# Patient Record
Sex: MALE | Race: WHITE
[De-identification: names, ages, dates, MRNs, and addresses within clinical notes are randomized per-mention and may not be internally consistent; named-entity substitution may affect disease eponyms.]

---

## 2018-02-12 ENCOUNTER — HOSPITAL ENCOUNTER (INPATIENT)
Dept: HOSPITAL 74 - JER | Age: 83
Discharge: LEFT BEFORE DISCHARGE | DRG: 190 | End: 2018-02-12
Attending: SPECIALIST | Admitting: SPECIALIST
Payer: COMMERCIAL

## 2018-02-12 VITALS — BODY MASS INDEX: 21.9 KG/M2

## 2018-02-12 VITALS — TEMPERATURE: 97.9 F | SYSTOLIC BLOOD PRESSURE: 115 MMHG | DIASTOLIC BLOOD PRESSURE: 59 MMHG | HEART RATE: 104 BPM

## 2018-02-12 DIAGNOSIS — J18.8: ICD-10-CM

## 2018-02-12 DIAGNOSIS — F17.200: ICD-10-CM

## 2018-02-12 DIAGNOSIS — J44.0: Primary | ICD-10-CM

## 2018-02-12 DIAGNOSIS — E11.9: ICD-10-CM

## 2018-02-12 DIAGNOSIS — J44.1: ICD-10-CM

## 2018-02-12 LAB
ALBUMIN SERPL-MCNC: 3.7 G/DL (ref 3.4–5)
ALP SERPL-CCNC: 124 U/L (ref 45–117)
ALT SERPL-CCNC: 22 U/L (ref 12–78)
ANION GAP SERPL CALC-SCNC: 5 MMOL/L (ref 8–16)
AST SERPL-CCNC: 16 U/L (ref 15–37)
BASOPHILS # BLD: 0.5 % (ref 0–2)
BILIRUB SERPL-MCNC: 0.4 MG/DL (ref 0.2–1)
BUN SERPL-MCNC: 15 MG/DL (ref 7–18)
CALCIUM SERPL-MCNC: 8.2 MG/DL (ref 8.5–10.1)
CHLORIDE SERPL-SCNC: 100 MMOL/L (ref 98–107)
CO2 SERPL-SCNC: 32 MMOL/L (ref 21–32)
CREAT SERPL-MCNC: 0.6 MG/DL (ref 0.7–1.3)
DEPRECATED RDW RBC AUTO: 18.5 % (ref 11.9–15.9)
EOSINOPHIL # BLD: 2.7 % (ref 0–4.5)
GLUCOSE SERPL-MCNC: 126 MG/DL (ref 74–106)
HCT VFR BLD CALC: 31.8 % (ref 35.4–49)
HGB BLD-MCNC: 9.6 GM/DL (ref 11.7–16.9)
LYMPHOCYTES # BLD: 18.4 % (ref 8–40)
MCH RBC QN AUTO: 20.8 PG (ref 25.7–33.7)
MCHC RBC AUTO-ENTMCNC: 30.2 G/DL (ref 32–35.9)
MCV RBC: 69 FL (ref 80–96)
MONOCYTES # BLD AUTO: 8.8 % (ref 3.8–10.2)
NEUTROPHILS # BLD: 69.6 % (ref 42.8–82.8)
PLATELET # BLD AUTO: 297 K/MM3 (ref 134–434)
PMV BLD: 7.1 FL (ref 7.5–11.1)
POTASSIUM SERPLBLD-SCNC: 4.2 MMOL/L (ref 3.5–5.1)
PROT SERPL-MCNC: 7.4 G/DL (ref 6.4–8.2)
RBC # BLD AUTO: 4.62 M/MM3 (ref 4–5.6)
SODIUM SERPL-SCNC: 137 MMOL/L (ref 136–145)
WBC # BLD AUTO: 9.3 K/MM3 (ref 4–10)

## 2018-02-12 RX ADMIN — IPRATROPIUM BROMIDE AND ALBUTEROL SULFATE SCH AMP: .5; 3 SOLUTION RESPIRATORY (INHALATION) at 15:01

## 2018-02-12 RX ADMIN — IPRATROPIUM BROMIDE AND ALBUTEROL SULFATE SCH AMP: .5; 3 SOLUTION RESPIRATORY (INHALATION) at 15:19

## 2018-02-12 RX ADMIN — IPRATROPIUM BROMIDE AND ALBUTEROL SULFATE SCH AMP: .5; 3 SOLUTION RESPIRATORY (INHALATION) at 14:59

## 2018-02-12 NOTE — PDOC
History of Present Illness





- General


History Source: Patient





- History of Present Illness


Associated Symptoms: reports: cough, shortness of breath.  denies: chest pain/

soreness





<Shea DuboseAbnerMercy - Last Filed: 02/12/18 16:15>





<Vanita Leblanc - Last Filed: 02/12/18 19:15>





- General


Chief Complaint: Shortness of Breath


Stated Complaint: SOB


Time Seen by Provider: 02/12/18 14:14





Past History





- Past Medical History


Asthma: Yes


Cancer: Yes


COPD: Yes


Diabetes: Yes





- Suicide/Smoking/Psychosocial Hx


Smoking History: Current every day smoker


Have you smoked in the past 12 months: Yes


Number of Cigarettes Smoked Daily: 30


Information on smoking cessation initiated: No


Hx Alcohol Use: No


Drug/Substance Use Hx: No


Substance Use Type: None


Hx Substance Use Treatment: No





<Shea [facility]ose[facility] - Last Filed: 02/12/18 16:15>





<Vanita Leblanc - Last Filed: 02/12/18 19:15>





- Past Medical History


Allergies/Adverse Reactions: 


 Allergies











Allergy/AdvReac Type Severity Reaction Status Date / Time


 


No Known Allergies Allergy   Verified 02/12/18 13:55











Home Medications: 


Ambulatory Orders





Aspirin [Aspirin EC] 81 mg PO DAILY 02/12/18 


Diltiazem HCl [Cartia Xt] 120 mg PO DAILY 02/12/18 


Montelukast Sodium [Singulair] 10 mg PO DAILY 02/12/18 


Tamsulosin HCl 0.4 mg PO DAILY 02/12/18 











**Review of Systems





- Review of Systems


Constitutional: No: Chills, Fever


Respiratory: Yes: Cough, Shortness of Breath


Cardiac (ROS): No: Chest Pain, Lightheadedness, Palpitations, Syncope





<Shea Dubose[facility] - Last Filed: 02/12/18 16:15>





*Physical Exam





- Vital Signs


 Last Vital Signs











Temp Pulse Resp BP Pulse Ox


 


 97.9 F   104 H  24   115/59   97 


 


 02/12/18 13:48  02/12/18 13:48  02/12/18 13:48  02/12/18 13:48  02/12/18 13:48














- Physical Exam


General Appearance: Yes: Appropriately Dressed.  No: Apparent Distress


HEENT: positive: Normal Voice


Neck: positive: Supple.  negative: Lymphadenopathy (R), Lymphadenopathy (L)


Respiratory/Chest: positive: Wheezing (diffusely)


Cardiovascular: positive: S1, S2, Tachycardia


Gastrointestinal/Abdominal: positive: Soft.  negative: Tender


Extremity: negative: Pedal Edema


Integumentary: positive: Dry, Warm


Neurologic: positive: Fully Oriented, Alert, Normal Mood/Affect





<Edin Dubose - Last Filed: 02/12/18 16:15>





- Vital Signs


 Last Vital Signs











Temp Pulse Resp BP Pulse Ox


 


 97.9 F   104 H  24   115/59   97 


 


 02/12/18 13:48  02/12/18 13:48  02/12/18 13:48  02/12/18 13:48  02/12/18 13:48














<Vanita Leblanc - Last Filed: 02/12/18 19:15>





ED Treatment Course





- LABORATORY


CBC & Chemistry Diagram: 


 02/12/18 14:40





 02/12/18 14:40





- RADIOLOGY


Radiology Studies Ordered: 














 Category Date Time Status


 


 CHEST PA & LAT [RAD] Stat Radiology  02/12/18 14:21 Ordered














<Shea [facility]ose[facility] - Last Filed: 02/12/18 16:15>





- LABORATORY


CBC & Chemistry Diagram: 


 02/12/18 14:40





 02/12/18 14:40





- ADDITIONAL ORDERS


Additional order review: 


 Laboratory  Results











  02/12/18 02/12/18 02/12/18





  14:40 14:40 14:40


 


Sodium  137  


 


Potassium  4.2  


 


Chloride  100  


 


Carbon Dioxide  32  


 


Anion Gap  5 L  


 


BUN  15  


 


Creatinine  0.6 L  


 


Creat Clearance w eGFR  > 60  


 


Random Glucose  126 H  


 


Calcium  8.2 L  


 


Total Bilirubin  0.4  


 


AST  16  D  


 


ALT  22  


 


Alkaline Phosphatase  124 H  


 


Creatine Kinase    150


 


Creatine Kinase Index    2.8


 


CK-MB (CK-2)    4.297 H


 


Troponin I    < 0.02


 


B-Natriuretic Peptide   132.01 


 


Total Protein  7.4  


 


Albumin  3.7  








 











  02/12/18





  14:40


 


RBC  4.62


 


MCV  69.0 L


 


MCHC  30.2 L


 


RDW  18.5 H D


 


MPV  7.1 L


 


Neutrophils %  69.6


 


Lymphocytes %  18.4  D


 


Monocytes %  8.8


 


Eosinophils %  2.7  D


 


Basophils %  0.5  D














- Medications


Given in the ED: 


ED Medications














Discontinued Medications














Generic Name Dose Route Start Last Admin





  Trade Name Freq  PRN Reason Stop Dose Admin


 


Albuterol/Ipratropium  1 amp  02/12/18 14:30  02/12/18 15:19





  Duoneb -  NEB  02/12/18 15:16  1 amp





  Q15M SAM   Administration


 


Azithromycin  500 mg  02/12/18 15:42  02/12/18 15:50





  Zithromax -  PO  02/12/18 15:43  500 mg





  ONCE ONE   Administration


 


Ceftriaxone Sodium  1 mg  02/12/18 16:13  02/12/18 16:30





  Rocephin -  IVPUSH  02/12/18 16:14  Not Given





  ONCE ONE   


 


Ceftriaxone Sodium 1 gm/  100 mls @ 200 mls/hr  02/12/18 16:18  02/12/18 16:30





  Dextrose  IVPB  02/12/18 16:47  200 mls/hr





  ONCE ONE   Administration


 


Methylprednisolone Sodium Succinate  125 mg  02/12/18 14:21  02/12/18 14:59





  Solu-Medrol -  IVPUSH  02/12/18 14:22  125 mg





  ONCE ONE   Administration














<Vanita Leblanc - Last Filed: 02/12/18 19:15>





Medical Decision Making





- Medical Decision Making





02/12/18 14:22


85-year-old male, current smoker, bladder cancer, COPD, not on oxygen, known 

stable lung nodules, hypertension and diabetes, brought in by wife for 

worsening shortness of breath.  As per wife, patient has had difficulty 

breathing for 3 months with possible fatigue.  Was seen by his primary doctor >

2 months ago  who wanted pt admitted but pt refused at that time.  Presents 

today because shortness of breath worsened 3 days ago and not improving with 

several pumps at home.  Patient complains of cough which is appears chronic and 

mostly dry, no fever, chills or CP.  Possible recent unexplained weight loss 

per wife.  Patient denies any chest pain





See exam








COPD flare, less likely ACS, PE or dissection


-nebs


-pred


-cxr


-ekg


-labs


-anticipate admission


02/12/18 14:28





02/12/18 14:29





02/12/18 16:15


Chest x-ray read as left basilar consolidation.  Case discussed with Dr. Watkins

, patient's PMD, who is requesting that Dr. Cade of pulmonary be consulted.

  Blood cultures and antibiotics in progress.  Patient admitted











<Edin Dubose - Last Filed: 02/12/18 16:15>





*DC/Admit/Observation/Transfer





- Discharge Dispostion


Admit: Yes





<Edin Dubose - Last Filed: 02/12/18 16:15>





- Attestations


Physician Attestion: 





I reviewed the case with the mid-level practitioner and agree with the mid-

level practitioner's assessment, diagnosis and disposition.








<Vanita Leblanc - Last Filed: 02/12/18 19:15>


Diagnosis at time of Disposition: 


 COPD exacerbation





Pneumonia


Qualifiers:


 Pneumonia type: due to unspecified organism Laterality: left Lung location: 

unspecified part of lung Qualified Code(s): J18.9 - Pneumonia, unspecified 

organism








- Discharge Dispostion


Condition at time of disposition: Fair

## 2018-02-12 NOTE — EKG
Test Reason : 

Blood Pressure : ***/*** mmHG

Vent. Rate : 098 BPM     Atrial Rate : 098 BPM

   P-R Int : 132 ms          QRS Dur : 126 ms

    QT Int : 376 ms       P-R-T Axes : 065 080 046 degrees

   QTc Int : 480 ms

 

NORMAL SINUS RHYTHM

RIGHT BUNDLE BRANCH BLOCK

CANNOT RULE OUT INFERIOR INFARCT (CITED ON OR BEFORE 02-APR-2015)

ABNORMAL ECG

WHEN COMPARED WITH ECG OF 05-APR-2015 09:13,

NO SIGNIFICANT CHANGE WAS FOUND

Confirmed by DOMENICO ZUÑIGA MD ([id number]) on 2/12/2018 11:05:50 PM

 

Referred By:             Confirmed By:DOMENICO ZUÑIGA MD

## 2018-02-13 ENCOUNTER — HOSPITAL ENCOUNTER (INPATIENT)
Dept: HOSPITAL 74 - JER | Age: 83
LOS: 5 days | Discharge: HOME | DRG: 190 | End: 2018-02-18
Attending: SPECIALIST | Admitting: SPECIALIST
Payer: COMMERCIAL

## 2018-02-13 VITALS — BODY MASS INDEX: 20.3 KG/M2

## 2018-02-13 DIAGNOSIS — F17.210: ICD-10-CM

## 2018-02-13 DIAGNOSIS — J98.11: ICD-10-CM

## 2018-02-13 DIAGNOSIS — J18.9: ICD-10-CM

## 2018-02-13 DIAGNOSIS — D50.9: ICD-10-CM

## 2018-02-13 DIAGNOSIS — J44.1: Primary | ICD-10-CM

## 2018-02-13 DIAGNOSIS — R63.4: ICD-10-CM

## 2018-02-13 DIAGNOSIS — I10: ICD-10-CM

## 2018-02-13 DIAGNOSIS — E11.9: ICD-10-CM

## 2018-02-13 DIAGNOSIS — J20.9: ICD-10-CM

## 2018-02-13 LAB
ALBUMIN SERPL-MCNC: 3.5 G/DL (ref 3.4–5)
ALP SERPL-CCNC: 112 U/L (ref 45–117)
ALT SERPL-CCNC: 20 U/L (ref 12–78)
ANION GAP SERPL CALC-SCNC: 8 MMOL/L (ref 8–16)
ANION GAP SERPL CALC-SCNC: 9 MMOL/L (ref 8–16)
APPEARANCE UR: CLEAR
APTT BLD: 29.5 SECONDS (ref 26.9–34.4)
AST SERPL-CCNC: 12 U/L (ref 15–37)
BASOPHILS # BLD: 0.1 % (ref 0–2)
BILIRUB SERPL-MCNC: 0.4 MG/DL (ref 0.2–1)
BILIRUB UR STRIP.AUTO-MCNC: NEGATIVE MG/DL
BUN SERPL-MCNC: 24 MG/DL (ref 7–18)
BUN SERPL-MCNC: 24 MG/DL (ref 7–18)
CALCIUM SERPL-MCNC: 8.8 MG/DL (ref 8.5–10.1)
CALCIUM SERPL-MCNC: 8.9 MG/DL (ref 8.5–10.1)
CHLORIDE SERPL-SCNC: 101 MMOL/L (ref 98–107)
CHLORIDE SERPL-SCNC: 101 MMOL/L (ref 98–107)
CO2 SERPL-SCNC: 28 MMOL/L (ref 21–32)
CO2 SERPL-SCNC: 29 MMOL/L (ref 21–32)
COLOR UR: (no result)
CREAT SERPL-MCNC: 0.7 MG/DL (ref 0.7–1.3)
CREAT SERPL-MCNC: 0.8 MG/DL (ref 0.7–1.3)
DEPRECATED RDW RBC AUTO: 18.2 % (ref 11.9–15.9)
DEPRECATED RDW RBC AUTO: 18.4 % (ref 11.9–15.9)
EOSINOPHIL # BLD: 0 % (ref 0–4.5)
GLUCOSE SERPL-MCNC: 175 MG/DL (ref 74–106)
GLUCOSE SERPL-MCNC: 177 MG/DL (ref 74–106)
HCT VFR BLD CALC: 31.6 % (ref 35.4–49)
HCT VFR BLD CALC: 31.8 % (ref 35.4–49)
HGB BLD-MCNC: 9.6 GM/DL (ref 11.7–16.9)
HGB BLD-MCNC: 9.7 GM/DL (ref 11.7–16.9)
INR BLD: 1.05 (ref 0.82–1.09)
KETONES UR QL STRIP: NEGATIVE
LEUKOCYTE ESTERASE UR QL STRIP.AUTO: NEGATIVE
LYMPHOCYTES # BLD: 10.4 % (ref 8–40)
MCH RBC QN AUTO: 20.8 PG (ref 25.7–33.7)
MCH RBC QN AUTO: 20.9 PG (ref 25.7–33.7)
MCHC RBC AUTO-ENTMCNC: 30.3 G/DL (ref 32–35.9)
MCHC RBC AUTO-ENTMCNC: 30.4 G/DL (ref 32–35.9)
MCV RBC: 68.8 FL (ref 80–96)
MCV RBC: 68.8 FL (ref 80–96)
MONOCYTES # BLD AUTO: 5.4 % (ref 3.8–10.2)
NEUTROPHILS # BLD: 84.1 % (ref 42.8–82.8)
NITRITE UR QL STRIP: NEGATIVE
PH BLDV: 7.38 [PH] (ref 7.32–7.42)
PH UR: 5 [PH] (ref 5–8)
PLATELET # BLD AUTO: 289 K/MM3 (ref 134–434)
PLATELET # BLD AUTO: 290 K/MM3 (ref 134–434)
PMV BLD: 7 FL (ref 7.5–11.1)
PMV BLD: 7.1 FL (ref 7.5–11.1)
POTASSIUM SERPLBLD-SCNC: 4.5 MMOL/L (ref 3.5–5.1)
POTASSIUM SERPLBLD-SCNC: 4.6 MMOL/L (ref 3.5–5.1)
PROT SERPL-MCNC: 7.2 G/DL (ref 6.4–8.2)
PROT UR QL STRIP: (no result)
PROT UR QL STRIP: NEGATIVE
PT PNL PPP: 11.9 SEC (ref 9.98–11.88)
RBC # BLD AUTO: 4.59 M/MM3 (ref 4–5.6)
RBC # BLD AUTO: 4.62 M/MM3 (ref 4–5.6)
RBC # UR STRIP: NEGATIVE /UL
SODIUM SERPL-SCNC: 138 MMOL/L (ref 136–145)
SODIUM SERPL-SCNC: 138 MMOL/L (ref 136–145)
SP GR UR: 1.03 (ref 1–1.03)
UROBILINOGEN UR STRIP-MCNC: 2 MG/DL (ref 0.2–1)
VENOUS PC02: 46.7 MMHG (ref 38–52)
VENOUS PO2: 37.1 MMHG (ref 28–48)
WBC # BLD AUTO: 7.9 K/MM3 (ref 4–10)
WBC # BLD AUTO: 8.7 K/MM3 (ref 4–10)

## 2018-02-13 RX ADMIN — INSULIN ASPART SCH UNITS: 100 INJECTION, SOLUTION INTRAVENOUS; SUBCUTANEOUS at 16:38

## 2018-02-13 RX ADMIN — METHYLPREDNISOLONE SODIUM SUCCINATE SCH MG: 40 INJECTION, POWDER, FOR SOLUTION INTRAMUSCULAR; INTRAVENOUS at 12:45

## 2018-02-13 RX ADMIN — AZITHROMYCIN DIHYDRATE SCH MLS/HR: 500 INJECTION, POWDER, LYOPHILIZED, FOR SOLUTION INTRAVENOUS at 14:21

## 2018-02-13 RX ADMIN — METHYLPREDNISOLONE SODIUM SUCCINATE SCH MG: 40 INJECTION, POWDER, FOR SOLUTION INTRAMUSCULAR; INTRAVENOUS at 14:30

## 2018-02-13 RX ADMIN — CEFTRIAXONE SCH MLS/HR: 1 INJECTION, SOLUTION INTRAVENOUS at 12:48

## 2018-02-13 RX ADMIN — NICOTINE SCH MG: 21 PATCH TRANSDERMAL at 18:45

## 2018-02-13 RX ADMIN — NICOTINE POLACRILEX PRN MG: 4 GUM, CHEWING ORAL at 22:00

## 2018-02-13 RX ADMIN — MONTELUKAST SODIUM SCH MG: 10 TABLET, COATED ORAL at 21:36

## 2018-02-13 RX ADMIN — IPRATROPIUM BROMIDE AND ALBUTEROL SULFATE SCH AMP: .5; 3 SOLUTION RESPIRATORY (INHALATION) at 19:50

## 2018-02-13 RX ADMIN — NICOTINE POLACRILEX PRN MG: 4 GUM, CHEWING ORAL at 19:56

## 2018-02-13 RX ADMIN — METHYLPREDNISOLONE SODIUM SUCCINATE SCH MG: 40 INJECTION, POWDER, FOR SOLUTION INTRAMUSCULAR; INTRAVENOUS at 21:35

## 2018-02-13 NOTE — EKG
Test Reason : 

Blood Pressure : ***/*** mmHG

Vent. Rate : 113 BPM     Atrial Rate : 113 BPM

   P-R Int : 134 ms          QRS Dur : 122 ms

    QT Int : 366 ms       P-R-T Axes : 088 086 069 degrees

   QTc Int : 502 ms

 

SINUS TACHYCARDIA

POSSIBLE LEFT ATRIAL ENLARGEMENT

RIGHT BUNDLE BRANCH BLOCK

CANNOT RULE OUT INFERIOR INFARCT (CITED ON OR BEFORE 02-APR-2015)

ABNORMAL ECG

WHEN COMPARED WITH ECG OF 12-FEB-2018 15:27,

NO SIGNIFICANT CHANGE WAS FOUND

Confirmed by MD Brynn, William ([id number]) on 2/13/2018 2:07:35 PM

 

Referred By:             Confirmed By:William Swain MD

## 2018-02-13 NOTE — PDOC
History of Present Illness





- General


History Source: Patient


Exam Limitations: No Limitations





- History of Present Illness


Initial Comments: 





02/13/18 10:34


The patient is a 85-year-old male with a significant past medical history of 

COPD (not on home O2), HTN, DM, asthma, bladder CA, heavy tobacco use, and a 

history of respiratory arrest, and presents to the emergency department with 

shortness of breath for 6-7 months. Patient was seen in this ED yesterday for 

the same complaint and was planned for admission. However, the patient reports 

he walked out AMA and did not stay in the hospital. Patient states his 

shortness of breath has worsened over the last 2 months. He states he cannot 

walk very far without being short of breath. 





The patient denies chest pain, leg swelling, headache and dizziness. The 

patient denies fever, chills, nausea, vomit, diarrhea and constipation. The 

patient denies dysuria, frequency, urgency and hematuria.





Allergies: NKDA


Past Surgical History: stomach ulcer surgery


Social History: Current everyday smoker, no other toxic habits reported


PCP: Dr. Javier


Pulmonologist: Dr. Welch








<Keysha Hunt - Last Filed: 02/13/18 10:34>





<Maira Wahl - Last Filed: 02/13/18 14:05>





- General


Chief Complaint: Shortness of Breath


Stated Complaint: REVISIT, PAIN


Time Seen by Provider: 02/13/18 09:21





Past History





<Keysha Hunt - Last Filed: 02/13/18 10:34>





- Past Medical History


Asthma: Yes


Cancer: Yes


COPD: Yes (EMPHYSEMA)


Diabetes: No (LOW BL.SUGAR)





- Surgical History


Abdominal Surgery: Yes (STOMACH ULCER)





- Suicide/Smoking/Psychosocial Hx


Smoking History: Current every day smoker


Have you smoked in the past 12 months: Yes


Number of Cigarettes Smoked Daily: 30


Information on smoking cessation initiated: Yes


'Breaking Loose' booklet given: 02/13/18


Hx Alcohol Use: No


Drug/Substance Use Hx: No


Substance Use Type: None


Hx Substance Use Treatment: No





<Maira Wahl - Last Filed: 02/13/18 14:05>





- Past Medical History


Allergies/Adverse Reactions: 


 Allergies











Allergy/AdvReac Type Severity Reaction Status Date / Time


 


No Known Allergies Allergy   Verified 02/13/18 08:44











Home Medications: 


Ambulatory Orders





Aspirin [Aspirin EC] 81 mg PO DAILY 02/12/18 


Diltiazem HCl [Cartia Xt] 120 mg PO DAILY 02/12/18 


Montelukast Sodium [Singulair] 10 mg PO DAILY 02/12/18 


Tamsulosin HCl 0.4 mg PO DAILY 02/12/18 











**Review of Systems





- Review of Systems


Able to Perform ROS?: Yes


Comments:: 





02/13/18 10:34


GENERAL/CONSTITUTIONAL: No: fever, chills, weakness, loss of appetite.


HEAD, EYES, EARS, NOSE AND THROAT: No: change in vision, ear pain, discharge, 

sore throat, throat swelling.


CARDIOVASCULAR: No: chest pain, lightheadedness, palpitations, syncope


RESPIRATORY: (+) Shortness of breath. No: cough, wheezing, hemoptysis, stridor.


GASTROINTESTINAL: No: nausea, vomiting, abdominal cramping, diarrhea, rectal 

bleeding, constipation. 


GENITOURINARY: No: dysuria, hematuria, frequency, urgency, flank pain.


MUSCULOSKELETAL: No: back pain, neck pain, joint pain, muscle swelling or pain


SKIN: No: lesions, pallor, rash or easy bruising.


NEUROLOGIC: No: headache, vertigo, paresthesias, weakness 


ENDOCRINE: No: unexplained weight gain or loss


HEMATOLOGIC/LYMPHATIC: No: anemia, easy bleeding, swelling nodes








<Hunt,Keysha - Last Filed: 02/13/18 10:34>





*Physical Exam





- Vital Signs


 Last Vital Signs











Temp Pulse Resp BP Pulse Ox


 


 97.7 F   113 H  22   122/63   96 


 


 02/13/18 08:44  02/13/18 08:44  02/13/18 08:44  02/13/18 08:44  02/13/18 08:44














- Physical Exam


Comments: 





02/13/18 10:34


GENERAL: The patient is in no acute distress. 


HEAD: Normal with no signs of trauma.


EYES: PERRLA, EOMI, sclera anicteric, conjunctiva clear.


ENT: Ears normal, nares patent, oropharynx clear without exudates.  Moist 

mucous membranes.


NECK: Normal range of motion, supple without lymphadenopathy, JVD, or masses.


LUNGS: (+) Expiratory and inspiratory wheezes throughout. No crackles.


HEART:Regular rate and rhythm, normal S1 and S2 without murmur, rub or gallop.


ABDOMEN: Soft, nontender, normoactive bowel sounds.  No guarding, no rebound.


EXTREMITIES: Normal range of motion, no LE edema.  No clubbing or cyanosis. No 

erythema, or tenderness.


NEUROLOGICAL: Cranial nerves II through XII grossly intact.  Normal speech.  No 

focal  neurological deficits. 


MUSCULOSKELETAL:  Back nontender to palpation, no CVA tenderness


SKIN: Warm, Dry, normal turgor, no rashes or lesions noted.








<Keysha Hunt - Last Filed: 02/13/18 10:34>





- Vital Signs


 Last Vital Signs











Temp Pulse Resp BP Pulse Ox


 


 97.7 F   113 H  22   122/63   96 


 


 02/13/18 08:44  02/13/18 08:44  02/13/18 08:44  02/13/18 08:44  02/13/18 08:44














<Maira Wahl - Last Filed: 02/13/18 14:05>





ED Treatment Course





- LABORATORY


CBC & Chemistry Diagram: 


 02/13/18 10:19





 02/13/18 10:19





- ADDITIONAL ORDERS


Additional order review: 


 Laboratory  Results











  02/13/18





  09:55


 


VBG pH  7.38


 


POC VBG pCO2  46.7


 


POC VBG pO2  37.1


 


Mixed VBG HCO3  26.8 H














<Keysha Hunt - Last Filed: 02/13/18 10:34>





- LABORATORY


CBC & Chemistry Diagram: 


 02/13/18 10:19





 02/13/18 10:19





<Maira Wahl - Last Filed: 02/13/18 14:05>





Medical Decision Making





- Medical Decision Making





02/13/18 10:22


This is an 85-year-old male with a history of hypertension, severe COPD, 

continued tobacco use who was seen in the emergency department yesterday for 

shortness of breath.


This patient was admitted by his primary care physician.


This patient ultimately left AGAINST MEDICAL ADVICE.


Emergency Department today with a complaint of shortness of breath.


No fevers at home.


Patient states he is dyspneic on exertion.


He denies chest pain.


He has noted a cough





Patient's x-ray from yesterday demonstrates left lower lobe pneumonia.





Examination currently demonstrates inspiratory and expiratory wheezing, 

consistent with COPD exacerbation


02/13/18 10:23





Patient seen in the emergency department by Dr. Albino Javier


Will admit to service.








Clinical impression: pneumonia, initial presentation


COPD exacerbation, initial presentation


02/13/18 14:05


EKG:


Sinus tachycardia, rate of 113 bpm, right axis deviation, right bundle branch 

block





<Maira Wahl - Last Filed: 02/13/18 14:05>





*DC/Admit/Observation/Transfer





- Attestations


Scribe Attestion: 





02/13/18 10:35


Documentation prepared by Keysha Hunt, acting as medical scribe for Maira Wahl MD/DO.





<Keysha Hunt - Last Filed: 02/13/18 10:34>





- Discharge Dispostion


Admit: Yes





<Maira Wahl - Last Filed: 02/13/18 14:05>


Diagnosis at time of Disposition: 


 COPD exacerbation





Pneumonia


Qualifiers:


 Pneumonia type: due to unspecified organism Laterality: left Lung location: 

lower lobe of lung Qualified Code(s): J18.1 - Lobar pneumonia, unspecified 

organism








- Discharge Dispostion


Condition at time of disposition: Stable

## 2018-02-13 NOTE — HP
Admitting History and Physical





- Primary Care Physician


PCP: Albino Javier





- Admission


Chief Complaint: SOB


History of Present Illness: 





Pt with Hx/o COPD, current smoker, with [place] for couple of months (seen in the 

office, with his son at bed side, referred to ER but pt refused) now came to ER 

for worsening of his condition, walking only few steps before gasping for air.


Pt came to ER yesterday, was admitted for PNA and acute COPD exacerbation, but 

pt left AMA last night.


History Source: Patient


Limitations to Obtaining History: No Limitations





- Past Medical History


Cardiovascular: Yes: CAD, HTN


Pulmonary: Yes: COPD


Renal/: Yes: Cancer (bladder cancer- transitional cell), Other (PAST PROSTATE 

CA)


Endocrine: Yes: Diabetes Mellitus (diet controlled)





- Smoking History


Smoking history: Current every day smoker (he started smoking before 15 years 

of age; he smoked for years over 2 packs)


Have you smoked in the past 12 months: Yes


Aproximately how many cigarettes per day: 30





- Alcohol/Substance Use


Hx Alcohol Use: No





Home Medications





- Allergies


Allergies/Adverse Reactions: 


 Allergies











Allergy/AdvReac Type Severity Reaction Status Date / Time


 


No Known Allergies Allergy   Verified 02/13/18 08:44














- Home Medications


Home Medications: 


Ambulatory Orders





Aspirin [Aspirin EC] 81 mg PO DAILY 02/12/18 


Diltiazem HCl [Cartia Xt] 120 mg PO DAILY 02/12/18 


Montelukast Sodium [Singulair] 10 mg PO DAILY 02/12/18 


Tamsulosin HCl 0.4 mg PO DAILY 02/12/18 











Review of Systems





- Review of Systems


Constitutional: denies: Chills, Fever, Night Sweats


Eyes: denies: Double Vision, Eye Pain, Recent Change in Vision


HENT: denies: Difficult Swallowing, Ear Discharge, Throat Pain


Neck: denies: Pain on Movement, Tenderness


Cardiovascular: denies: Chest Pain, Edema, Palpitations


Respiratory: reports: Cough, SOB on Exertion, Wheezing


Gastrointestinal: denies: Abdominal Pain, Constipation, Nausea


Genitourinary: reports: Frequency.  denies: Burning, Discharge


Musculoskeletal: denies: Back Pain, Joint Swelling


Integumentary: denies: Eczema, Pruritis, Rash


Neurological: denies: Change in LOC, Change in Speech, Numbness


Endocrine: denies: Excessive Sweating, Intolerance to Cold


Hematology/Lymphatic: denies: Easily Bruised, Excessive Bleeding


Psychiatric: denies: Anxiety, Depression





Physical Examination


Vital Signs: 


 Vital Signs











Temperature  97.7 F   02/13/18 08:44


 


Pulse Rate  103 H  02/13/18 10:30


 


Respiratory Rate  18   02/13/18 10:30


 


Blood Pressure  120/64   02/13/18 10:30


 


O2 Sat by Pulse Oximetry (%)  100   02/13/18 10:30











Constitutional: Yes: No Distress, Calm


Eyes: Yes: Conjunctiva Clear, EOM Intact


HENT: Yes: Normocephalic.  No: Nasal Congestion


Neck: Yes: Trachea Midline.  No: Lymphadenopathy, Tenderness


Cardiovascular: Yes: Regular Rate and Rhythm, S1, S2


Respiratory: Yes: Regular, Rhonchi


Gastrointestinal: Yes: Normal Bowel Sounds, Soft.  No: Tenderness


Renal/: No: CVA Tenderness - Left, CVA Tenderness - Right


Musculoskeletal: No: Joint Stiffness, Joint Swelling


Extremities: Yes: Cold, Cool


Edema: No


Integumentary: No: Bruising, Rash


Neurological: Yes: Alert, Oriented, Other (symmetric sensorty and motor 

examinationin UE/ LE/ face)


Psychiatric: Yes: Alert, Oriented.  No: Agitated


Labs: 


 CBC, BMP





 02/13/18 10:19 





 02/13/18 10:19 











Imaging





- Results


Chest X-ray: Report Reviewed





Problem List





- Problems


(1) Pneumonia


Code(s): J18.9 - PNEUMONIA, UNSPECIFIED ORGANISM   


Qualifiers: 


   Pneumonia type: due to unspecified organism   Laterality: left   Lung 

location: lower lobe of lung   Qualified Code(s): J18.1 - Lobar pneumonia, 

unspecified organism   





(2) Acute exacerbation of chronic obstructive pulmonary disease (COPD)


Code(s): J44.1 - CHRONIC OBSTRUCTIVE PULMONARY DISEASE W (ACUTE) EXACERBATION   





(3) DM2 (diabetes mellitus, type 2)


Code(s): E11.9 - TYPE 2 DIABETES MELLITUS WITHOUT COMPLICATIONS   





(4) HTN (hypertension)


Code(s): I10 - ESSENTIAL (PRIMARY) HYPERTENSION   





Assessment/Plan





IV abtx


IV steroids


Pulmonary consult


AM labs

## 2018-02-13 NOTE — CON.PULM
Consult


Consult Specialty:: Pulm


Referred by:: Dr. Javier


Reason for Consultation:: COPD exacerbation, weight loss





- History of Present Illness


Chief Complaint: SOB


History of Present Illness: 





85 year old M with pmh of bladder CA, COPD (not on home O2), lung nodules (last 

CT 11/2017), HTN, DM, and current everyday smoker (2 ppd x 60 yrs) presented 

with worsening SOB, difficulty breathing x 3 months. Pt saw PCP 2 months ago 

who recommended admission, but patient refused. Patient came to the ED 

yesterday with 3 day hx of sob without any improvement and cough with yellow 

sputum x 2 months. Patient endorses 60 lb weight loss over since last year (185 

to 120). Patient left AMA and returned today. Patient denies fever, chills, 

chest pain, wheezing, hemoptysis, leg swelling.





- History Source


History Provided By: Patient


Limitations to Obtaining History: No Limitations





- Past Medical History


Cardio/Vascular: Yes: HTN, Other (ASHD/SMOKER)


Pulmonary: Yes: COPD


Renal/: Yes: Cancer (bladder cancer- transitional cell), Other (PAST PROSTATE 

CA)


Endocrine: Yes: Diabetes Mellitus





- Alcohol/Substance Use


Hx Alcohol Use: No





- Smoking History


Smoking history: Current every day smoker


Have you smoked in the past 12 months: Yes


Aproximately how many cigarettes per day: 30 (2 ppd)





<Ishaan Hoyt - Last Filed: 02/13/18 15:03>





Home Medications





<Ishaan Hoyt - Last Filed: 02/13/18 15:03>





<Hamlet Hawkins - Last Filed: 02/13/18 15:16>





- Allergies


Allergies/Adverse Reactions: 


 Allergies











Allergy/AdvReac Type Severity Reaction Status Date / Time


 


No Known Allergies Allergy   Verified 02/13/18 08:44














- Home Medications


Home Medications: 


Ambulatory Orders





Aspirin [Aspirin EC] 81 mg PO DAILY 02/12/18 


Diltiazem HCl [Cartia Xt] 120 mg PO DAILY 02/12/18 


Montelukast Sodium [Singulair] 10 mg PO DAILY 02/12/18 


Tamsulosin HCl 0.4 mg PO DAILY 02/12/18 











Review of Systems





- Review of Systems


Constitutional: reports: No Symptoms


Eyes: reports: No Symptoms


HENT: reports: No Symptoms


Cardiovascular: reports: No Symptoms


Respiratory: reports: Cough (with yellow sputum), SOB.  denies: Hemoptysis, 

Wheezing


Gastrointestinal: reports: No Symptoms


Musculoskeletal: reports: No Symptoms


Neurological: reports: No Symptoms


Psychiatric: reports: No Symptoms





<Ishaan Hoyt - Last Filed: 02/13/18 15:03>





Physical Exam


Vital Sings: 


 Vital Signs











Temperature  97.7 F   02/13/18 08:44


 


Pulse Rate  103 H  02/13/18 10:30


 


Respiratory Rate  18   02/13/18 10:30


 


Blood Pressure  120/64   02/13/18 10:30


 


O2 Sat by Pulse Oximetry (%)  100   02/13/18 10:30











Constitutional: Yes: No Distress, Calm, Thin


Eyes: Yes: WNL, Conjunctiva Clear, EOM Intact


HENT: Yes: WNL, Atraumatic, Normocephalic


Neck: Yes: Supple, Trachea Midline, Lymphadenopathy (Right cervical)


Cardiovascular: Yes: WNL, Tachycardia, S1, S2


Respiratory: Yes: Cough, Poor Air Entry, SOB, Wheezes (Diffuse bilaterally)


...Breath Sounds: ZULMA Wheezes, ZULMA Diminished, LLL Wheezes, LLL Diminished, RUL 

Wheezes, RUL Diminished, RML Wheezes, RML Diminished


Gastrointestinal: Yes: WNL, Normal Bowel Sounds, Soft.  No: Splenomegaly, 

Tenderness


Edema: No


Neurological: Yes: WNL, Alert, Oriented


Labs: 


 CBC, [facility]





 02/13/18 10:19 





 02/13/18 10:19 











<Ishaan Hoyt - Last Filed: 02/13/18 15:03>


Vital Sings: 


 Vital Signs











Temperature  97.7 F   02/13/18 08:44


 


Pulse Rate  103 H  02/13/18 10:30


 


Respiratory Rate  18   02/13/18 10:30


 


Blood Pressure  120/64   02/13/18 10:30


 


O2 Sat by Pulse Oximetry (%)  100   02/13/18 10:30











Labs: 


 CBC, [facility]





 02/13/18 10:19 





 02/13/18 10:19 











<Hamlet Hawkins - Last Filed: 02/13/18 15:16>





Assessment/Plan





85 year old M with pmh of bladder CA, COPD (not on home O2), lung nodules (last 

CT 11/2017), HTN, DM, and current everyday smoker (2 ppd x 60 yrs) presented 

with worsening SOB, difficulty breathing x 3 months.





#COPD exacerbation with L basilar consolidation/atelectasis


-IV steroids [Solumederol 40 mg q6h]


-Antibiotics [Ceftriaxone/Azithromycin], recommend low threshold to stop abx


-Bronchodilators [Recommend albuterol q4 prn and duonebs elin tid]


-O2 as needed


-Sputum CX





<Ishaan Hoyt - Last Filed: 02/13/18 15:03>


IMP: 


AE of COPD


Acute on chronic bronchitis


Multiple Pulmonary nodules: the largest being 6 mm in the ZULMA that has remained 

essentially unchanged since May 2016








IV Medrol 


BD TX 


O2 as needed to maintain saturation 


VTE prophylaxis 


No indication for CT as the most recent was November 2016


Low threshold to stop all ABX (Zmax for now) as does not appear to be PNA or 

bacterial infection 


Work up fpr significant weight loss suggested 


Vaccination 


Will follow 





Thank you. 





Dr Hawkins 





<Hamlet Hawkins - Last Filed: 02/13/18 15:16>

## 2018-02-14 LAB
ANION GAP SERPL CALC-SCNC: 9 MMOL/L (ref 8–16)
BUN SERPL-MCNC: 28 MG/DL (ref 7–18)
CALCIUM SERPL-MCNC: 8.7 MG/DL (ref 8.5–10.1)
CHLORIDE SERPL-SCNC: 100 MMOL/L (ref 98–107)
CO2 SERPL-SCNC: 30 MMOL/L (ref 21–32)
CREAT SERPL-MCNC: 0.7 MG/DL (ref 0.7–1.3)
DEPRECATED RDW RBC AUTO: 17.8 % (ref 11.9–15.9)
GLUCOSE SERPL-MCNC: 213 MG/DL (ref 74–106)
HCT VFR BLD CALC: 28.4 % (ref 35.4–49)
HGB BLD-MCNC: 8.7 GM/DL (ref 11.7–16.9)
MCH RBC QN AUTO: 20.7 PG (ref 25.7–33.7)
MCHC RBC AUTO-ENTMCNC: 30.5 G/DL (ref 32–35.9)
MCV RBC: 67.9 FL (ref 80–96)
PLATELET # BLD AUTO: 274 K/MM3 (ref 134–434)
PMV BLD: 7.2 FL (ref 7.5–11.1)
POTASSIUM SERPLBLD-SCNC: 4.3 MMOL/L (ref 3.5–5.1)
RBC # BLD AUTO: 4.19 M/MM3 (ref 4–5.6)
SERUM IRON SATURATION: 3 % (ref 15–55)
SODIUM SERPL-SCNC: 139 MMOL/L (ref 136–145)
TIBC SERPL-MCNC: 501 UG/DL (ref 250–450)
UIBC SERPL-MCNC: 484 UG/DL (ref 111–343)
WBC # BLD AUTO: 10.8 K/MM3 (ref 4–10)

## 2018-02-14 RX ADMIN — METHYLPREDNISOLONE SODIUM SUCCINATE SCH MG: 40 INJECTION, POWDER, FOR SOLUTION INTRAMUSCULAR; INTRAVENOUS at 03:02

## 2018-02-14 RX ADMIN — NICOTINE SCH MG: 21 PATCH TRANSDERMAL at 09:58

## 2018-02-14 RX ADMIN — NICOTINE POLACRILEX PRN MG: 2 GUM, CHEWING ORAL at 23:02

## 2018-02-14 RX ADMIN — INSULIN ASPART SCH UNITS: 100 INJECTION, SOLUTION INTRAVENOUS; SUBCUTANEOUS at 06:56

## 2018-02-14 RX ADMIN — TAMSULOSIN HYDROCHLORIDE SCH MG: 0.4 CAPSULE ORAL at 10:00

## 2018-02-14 RX ADMIN — METHYLPREDNISOLONE SODIUM SUCCINATE SCH MG: 40 INJECTION, POWDER, FOR SOLUTION INTRAMUSCULAR; INTRAVENOUS at 21:31

## 2018-02-14 RX ADMIN — AZITHROMYCIN DIHYDRATE SCH MLS/HR: 500 INJECTION, POWDER, LYOPHILIZED, FOR SOLUTION INTRAVENOUS at 11:28

## 2018-02-14 RX ADMIN — IPRATROPIUM BROMIDE AND ALBUTEROL SULFATE SCH AMP: .5; 3 SOLUTION RESPIRATORY (INHALATION) at 07:40

## 2018-02-14 RX ADMIN — INSULIN ASPART SCH: 100 INJECTION, SOLUTION INTRAVENOUS; SUBCUTANEOUS at 17:41

## 2018-02-14 RX ADMIN — METHYLPREDNISOLONE SODIUM SUCCINATE SCH MG: 40 INJECTION, POWDER, FOR SOLUTION INTRAMUSCULAR; INTRAVENOUS at 10:00

## 2018-02-14 RX ADMIN — IPRATROPIUM BROMIDE AND ALBUTEROL SULFATE SCH: .5; 3 SOLUTION RESPIRATORY (INHALATION) at 16:16

## 2018-02-14 RX ADMIN — NICOTINE POLACRILEX PRN MG: 4 GUM, CHEWING ORAL at 13:02

## 2018-02-14 RX ADMIN — IPRATROPIUM BROMIDE AND ALBUTEROL SULFATE SCH AMP: .5; 3 SOLUTION RESPIRATORY (INHALATION) at 20:47

## 2018-02-14 RX ADMIN — MONTELUKAST SODIUM SCH MG: 10 TABLET, COATED ORAL at 21:31

## 2018-02-14 RX ADMIN — CEFTRIAXONE SCH MLS/HR: 1 INJECTION, SOLUTION INTRAVENOUS at 09:59

## 2018-02-14 RX ADMIN — ASPIRIN SCH MG: 81 TABLET, COATED ORAL at 10:00

## 2018-02-14 RX ADMIN — NICOTINE POLACRILEX PRN MG: 4 GUM, CHEWING ORAL at 01:51

## 2018-02-14 RX ADMIN — METHYLPREDNISOLONE SODIUM SUCCINATE SCH MG: 40 INJECTION, POWDER, FOR SOLUTION INTRAMUSCULAR; INTRAVENOUS at 15:43

## 2018-02-14 RX ADMIN — NICOTINE POLACRILEX PRN MG: 4 GUM, CHEWING ORAL at 05:20

## 2018-02-14 NOTE — CONSULT
Consult


Consult Specialty:: Hematology





- Past Medical History


Cardio/Vascular: Yes: HTN, Other (ASHD/SMOKER)


Pulmonary: Yes: COPD


Renal/: Yes: Cancer (bladder cancer- transitional cell), Other (PAST PROSTATE 

CA)


Endocrine: Yes: Diabetes Mellitus





- Alcohol/Substance Use


Hx Alcohol Use: No





- Smoking History


Smoking history: Current every day smoker


Have you smoked in the past 12 months: Yes


Aproximately how many cigarettes per day: 30





Home Medications





- Allergies


Allergies/Adverse Reactions: 


 Allergies











Allergy/AdvReac Type Severity Reaction Status Date / Time


 


No Known Allergies Allergy   Verified 02/13/18 08:44














- Home Medications


Home Medications: 


Ambulatory Orders





Aspirin [Aspirin EC] 81 mg PO DAILY 02/12/18 


Diltiazem HCl [Cartia Xt] 120 mg PO DAILY 02/12/18 


Montelukast Sodium [Singulair] 10 mg PO DAILY 02/12/18 


Tamsulosin HCl 0.4 mg PO DAILY 02/12/18 











Physical Exam


Vital Signs: 


 Vital Signs











Temperature  98 F   02/14/18 17:13


 


Pulse Rate  103 H  02/14/18 17:13


 


Respiratory Rate  20   02/14/18 17:13


 


Blood Pressure  138/85   02/14/18 17:13


 


O2 Sat by Pulse Oximetry (%)  100   02/13/18 21:00











Labs: 


 CBC, BMP





 02/14/18 06:00 





 02/14/18 06:00 











Assessment/Plan





for anemia w/u 


CT c/a/p given his weight loss and current smoker


will send peripheral blood flow if CT negative.

## 2018-02-14 NOTE — PN
Progress Note, Physician


Chief Complaint: 





in bed awake alert NAD VSS afebrile


still some cough and SOB





- Current Medication List


Current Medications: 


Active Medications





Acetaminophen (Tylenol -)  650 mg PO Q6H PRN


   PRN Reason: PAIN LEVEL 4 - 6


Albuterol Sulfate (Ventolin 0.083% Nebulizer Soln -)  1 amp NEB Q4H PRN


   PRN Reason: SHORT OF BREATH/WHEEZING


   Last Admin: 02/14/18 05:20 Dose:  1 amp


Albuterol/Ipratropium (Duoneb -)  1 amp NEB RTID [facility]


   Last Admin: 02/14/18 07:40 Dose:  1 amp


Aspirin (Ecotrin -)  81 mg PO DAILY [facility]


   Last Admin: 02/14/18 10:00 Dose:  81 mg


Diltiazem HCl (Cardizem Cd -)  120 mg PO DAILY [facility]


   Last Admin: 02/14/18 10:00 Dose:  120 mg


Azithromycin 500 mg/ Dextrose  250 mls @ 250 mls/hr IVPB DAILY [facility]


   Last Admin: 02/13/18 14:21 Dose:  250 mls/hr


CEFTRIAXONE 1 G/50 ML PREMIX (Ceftriaxone 1 Gm-D5w Bag)  50 mls @ 100 mls/hr 

IVPB DAILY [facility]


   Last Admin: 02/14/18 09:59 Dose:  100 mls/hr


Insulin Aspart (Novolog Vial Sliding Scale -)  1 vial SQ BID@0700,1630 [facility]


   PRN Reason: Protocol


   Last Admin: 02/14/18 06:56 Dose:  4 units


Methylprednisolone Sodium Succinate (Solu-Medrol -)  40 mg IVPUSH Q6H-IV [facility]


   Last Admin: 02/14/18 10:00 Dose:  40 mg


Montelukast Sodium (Singulair -)  10 mg PO HS [facility]


   Last Admin: 02/13/18 21:36 Dose:  10 mg


Nicotine (Nicoderm Patch -)  21 mg TD DAILY [facility]


   Last Admin: 02/14/18 09:58 Dose:  21 mg


Nicotine Polacrilex (Nicorette Gum -)  4 mg BUC Q2H PRN


   PRN Reason: NICOTINE REPLACEMENT RX


   Last Admin: 02/14/18 05:20 Dose:  4 mg


Tamsulosin HCl (Flomax -)  0.4 mg PO DAILY@0830 [facility]


   Last Admin: 02/14/18 10:00 Dose:  0.4 mg











- Objective


Vital Signs: 


 Vital Signs











Temperature  98.7 F   02/14/18 09:20


 


Pulse Rate  104 H  02/14/18 09:20


 


Respiratory Rate  18   02/14/18 09:20


 


Blood Pressure  111/57   02/14/18 09:20


 


O2 Sat by Pulse Oximetry (%)  100   02/13/18 21:00











Constitutional: Yes: No Distress, Calm


Eyes: Yes: Conjunctiva Clear


HENT: Yes: Atraumatic


Neck: Yes: Supple


Cardiovascular: Yes: Regular Rate and Rhythm


Respiratory: Yes: Cough, SOB


Gastrointestinal: Yes: Soft.  No: Distention, Tenderness


Genitourinary: No: CVA Tenderness - Left, CVA Tenderness - Right, Hematuria


Musculoskeletal: No: Joint Stiffness, Joint Swelling


Extremities: No: Cold, Cool, Cyanosis


Edema: No


Integumentary: No: Rash, Skin Tear


Neurological: Yes: WNL, Alert, Oriented


...Motor Strength: WNL


Psychiatric: Yes: WNL, Alert, Oriented.  No: Agitated, Suicidal Ideation


Labs: 


 CBC, BMP





 02/14/18 06:00 





 02/14/18 06:00 





 INR, PTT











INR  1.05  (0.82-1.09)   02/13/18  10:19    














- ....Imaging


Other: Report Reviewed





Assessment/Plan





85 year old M with pmh of bladder CA, COPD (not on home O2), lung nodules (last 

CT 11/2017), HTN, DM, and current everyday smoker (2 ppd x 60 yrs) presented 

with worsening SOB, difficulty breathing x 3 months worse for the last few days 

without any improvement and cough with yellow sputum x 2 months. Patient 

endorses 60 lb weight loss over since last year


admitted with PNA and acute COPD exac


IV steroids, IV antibiotics, nebs, pulm eval


weight loss, anemia: needs further w/u r/o malignancy


strongly advised stop smoking


falls, DVT, aspiration decubs PFX d/w pt and staff

## 2018-02-14 NOTE — PN
Progress Note, Physician


Chief Complaint: 





DYSPNEA


History of Present Illness: 





REVIEWED





- Current Medication List


Current Medications: 


Active Medications





Acetaminophen (Tylenol -)  650 mg PO Q6H PRN


   PRN Reason: PAIN LEVEL 4 - 6


Albuterol Sulfate (Ventolin 0.083% Nebulizer Soln -)  1 amp NEB Q4H PRN


   PRN Reason: SHORT OF BREATH/WHEEZING


   Last Admin: 02/14/18 05:20 Dose:  1 amp


Albuterol/Ipratropium (Duoneb -)  1 amp NEB RTID [facility]


   Last Admin: 02/14/18 07:40 Dose:  1 amp


Aspirin (Ecotrin -)  81 mg PO DAILY [facility]


   Last Admin: 02/14/18 10:00 Dose:  81 mg


Diltiazem HCl (Cardizem Cd -)  120 mg PO DAILY [facility]


   Last Admin: 02/14/18 10:00 Dose:  120 mg


Azithromycin 500 mg/ Dextrose  250 mls @ 250 mls/hr IVPB DAILY [facility]


   Last Admin: 02/14/18 11:28 Dose:  250 mls/hr


CEFTRIAXONE 1 G/50 ML PREMIX (Ceftriaxone 1 Gm-D5w Bag)  50 mls @ 100 mls/hr 

IVPB DAILY [facility]


   Last Admin: 02/14/18 09:59 Dose:  100 mls/hr


Insulin Aspart (Novolog Vial Sliding Scale -)  1 vial SQ BID@0700,1630 [facility]


   PRN Reason: Protocol


   Last Admin: 02/14/18 06:56 Dose:  4 units


Methylprednisolone Sodium Succinate (Solu-Medrol -)  40 mg IVPUSH Q6H-IV [facility]


   Last Admin: 02/14/18 10:00 Dose:  40 mg


Montelukast Sodium (Singulair -)  10 mg PO HS [facility]


   Last Admin: 02/13/18 21:36 Dose:  10 mg


Nicotine (Nicoderm Patch -)  21 mg TD DAILY [facility]


   Last Admin: 02/14/18 09:58 Dose:  21 mg


Nicotine Polacrilex (Nicorette Gum -)  4 mg BUC Q2H PRN


   PRN Reason: NICOTINE REPLACEMENT RX


   Last Admin: 02/14/18 13:02 Dose:  4 mg


Tamsulosin HCl (Flomax -)  0.4 mg PO DAILY@0830 [facility]


   Last Admin: 02/14/18 10:00 Dose:  0.4 mg











- Objective


Vital Signs: 


 Vital Signs











Temperature  98.2 F   02/14/18 14:56


 


Pulse Rate  97 H  02/14/18 14:56


 


Respiratory Rate  18   02/14/18 14:56


 


Blood Pressure  123/67   02/14/18 14:56


 


O2 Sat by Pulse Oximetry (%)  100   02/13/18 21:00











Constitutional: Yes: Calm


Eyes: Yes: EOM Intact


HENT: Yes: Normocephalic


Neck: Yes: Trachea Midline


Cardiovascular: Yes: Regular Rate and Rhythm


Respiratory: Yes: Diminished, Hyperresonant


Gastrointestinal: Yes: Soft


Extremities: Yes: WNL


Edema: No


Neurological: Yes: Alert


Labs: 


 CBC, BMP





 02/14/18 06:00 





 02/14/18 06:00 





 INR, PTT











INR  1.05  (0.82-1.09)   02/13/18  10:19    














- ....Imaging


Chest X-ray: Report Reviewed, Image Reviewed


EKG: Report Reviewed





Problem List





- Problems


(1) Acute exacerbation of chronic obstructive pulmonary disease (COPD)


Code(s): J44.1 - CHRONIC OBSTRUCTIVE PULMONARY DISEASE W (ACUTE) EXACERBATION   





(2) COPD exacerbation


Code(s): J44.1 - CHRONIC OBSTRUCTIVE PULMONARY DISEASE W (ACUTE) EXACERBATION   





(3) Pneumonia


Code(s): J18.9 - PNEUMONIA, UNSPECIFIED ORGANISM   


Qualifiers: 


   Pneumonia type: due to unspecified organism   Laterality: left   Lung 

location: lower lobe of lung   Qualified Code(s): J18.1 - Lobar pneumonia, 

unspecified organism   





(4) Anemia


Code(s): D64.9 - ANEMIA, UNSPECIFIED   





(5) HTN (hypertension)


Code(s): I10 - ESSENTIAL (PRIMARY) HYPERTENSION   





Assessment/Plan








#COPD exacerbation with L basilar consolidation/atelectasis


-IV steroids [Solumederol 40 mg q6h]


-Antibiotics [Ceftriaxone/Azithromycin], 


-Bronchodilators


-O2 as needed


 6 mm nodule in the ZULMA that has remained essentially unchanged   


VTE prophylaxis 


Work up fpr significant weight loss suggested 


Smoking cessation








EMI WILLIAM MD

## 2018-02-15 LAB
ANION GAP SERPL CALC-SCNC: 8 MMOL/L (ref 8–16)
BASOPHILS # BLD: 0.1 % (ref 0–2)
BUN SERPL-MCNC: 26 MG/DL (ref 7–18)
CALCIUM SERPL-MCNC: 7.8 MG/DL (ref 8.5–10.1)
CHLORIDE SERPL-SCNC: 102 MMOL/L (ref 98–107)
CO2 SERPL-SCNC: 28 MMOL/L (ref 21–32)
CREAT SERPL-MCNC: 0.6 MG/DL (ref 0.7–1.3)
DEPRECATED RDW RBC AUTO: 18.4 % (ref 11.9–15.9)
EOSINOPHIL # BLD: 0 % (ref 0–4.5)
ERYTHROCYTE [SEDIMENTATION RATE] IN BLOOD BY WESTERGREN METHOD: 6 MM/HR (ref 0–20)
GLUCOSE SERPL-MCNC: 165 MG/DL (ref 74–106)
HCT VFR BLD CALC: 28 % (ref 35.4–49)
HGB BLD-MCNC: 8.6 GM/DL (ref 11.7–16.9)
LYMPHOCYTES # BLD: 8.6 % (ref 8–40)
MCH RBC QN AUTO: 20.9 PG (ref 25.7–33.7)
MCHC RBC AUTO-ENTMCNC: 30.6 G/DL (ref 32–35.9)
MCV RBC: 68.1 FL (ref 80–96)
MONOCYTES # BLD AUTO: 1.8 % (ref 3.8–10.2)
NEUTROPHILS # BLD: 89.5 % (ref 42.8–82.8)
PLATELET # BLD AUTO: 236 K/MM3 (ref 134–434)
PMV BLD: 7.3 FL (ref 7.5–11.1)
POTASSIUM SERPLBLD-SCNC: 4.3 MMOL/L (ref 3.5–5.1)
RBC # BLD AUTO: 4.11 M/MM3 (ref 4–5.6)
RETICS # AUTO: 1.2 % (ref 0.5–1.5)
SERUM IRON SATURATION: 3 % (ref 15–55)
SODIUM SERPL-SCNC: 138 MMOL/L (ref 136–145)
TIBC SERPL-MCNC: 445 UG/DL (ref 250–450)
UIBC SERPL-MCNC: 430 UG/DL (ref 111–343)
WBC # BLD AUTO: 9.5 K/MM3 (ref 4–10)

## 2018-02-15 RX ADMIN — INSULIN ASPART SCH UNITS: 100 INJECTION, SOLUTION INTRAVENOUS; SUBCUTANEOUS at 06:05

## 2018-02-15 RX ADMIN — ASPIRIN SCH MG: 81 TABLET, COATED ORAL at 11:00

## 2018-02-15 RX ADMIN — CEFTRIAXONE SCH MLS/HR: 1 INJECTION, SOLUTION INTRAVENOUS at 11:01

## 2018-02-15 RX ADMIN — METHYLPREDNISOLONE SODIUM SUCCINATE SCH MG: 40 INJECTION, POWDER, FOR SOLUTION INTRAMUSCULAR; INTRAVENOUS at 11:01

## 2018-02-15 RX ADMIN — IPRATROPIUM BROMIDE AND ALBUTEROL SULFATE SCH AMP: .5; 3 SOLUTION RESPIRATORY (INHALATION) at 14:00

## 2018-02-15 RX ADMIN — MONTELUKAST SODIUM SCH MG: 10 TABLET, COATED ORAL at 22:38

## 2018-02-15 RX ADMIN — NICOTINE SCH MG: 21 PATCH TRANSDERMAL at 11:01

## 2018-02-15 RX ADMIN — NICOTINE POLACRILEX PRN MG: 2 GUM, CHEWING ORAL at 01:15

## 2018-02-15 RX ADMIN — METHYLPREDNISOLONE SODIUM SUCCINATE SCH MG: 40 INJECTION, POWDER, FOR SOLUTION INTRAMUSCULAR; INTRAVENOUS at 02:36

## 2018-02-15 RX ADMIN — IPRATROPIUM BROMIDE AND ALBUTEROL SULFATE SCH AMP: .5; 3 SOLUTION RESPIRATORY (INHALATION) at 21:05

## 2018-02-15 RX ADMIN — PANTOPRAZOLE SODIUM SCH MG: 20 TABLET, DELAYED RELEASE ORAL at 13:42

## 2018-02-15 RX ADMIN — INSULIN ASPART SCH: 100 INJECTION, SOLUTION INTRAVENOUS; SUBCUTANEOUS at 18:18

## 2018-02-15 RX ADMIN — AZITHROMYCIN DIHYDRATE SCH MLS/HR: 500 INJECTION, POWDER, LYOPHILIZED, FOR SOLUTION INTRAVENOUS at 13:42

## 2018-02-15 RX ADMIN — NICOTINE POLACRILEX PRN MG: 2 GUM, CHEWING ORAL at 14:40

## 2018-02-15 RX ADMIN — METHYLPREDNISOLONE SODIUM SUCCINATE SCH MG: 40 INJECTION, POWDER, FOR SOLUTION INTRAMUSCULAR; INTRAVENOUS at 22:38

## 2018-02-15 RX ADMIN — IPRATROPIUM BROMIDE AND ALBUTEROL SULFATE SCH AMP: .5; 3 SOLUTION RESPIRATORY (INHALATION) at 09:05

## 2018-02-15 RX ADMIN — HEPARIN SODIUM SCH UNIT: 5000 INJECTION, SOLUTION INTRAVENOUS; SUBCUTANEOUS at 22:38

## 2018-02-15 RX ADMIN — TAMSULOSIN HYDROCHLORIDE SCH MG: 0.4 CAPSULE ORAL at 11:00

## 2018-02-15 RX ADMIN — NICOTINE POLACRILEX PRN MG: 2 GUM, CHEWING ORAL at 04:20

## 2018-02-15 NOTE — CON.GI
Consult


Consult Specialty:: GI


Referred by:: Dr. Lucero Javier


Reason for Consultation:: Anemia





- History of Present Illness


Chief Complaint: No GI complaints. Patient admitted for shortness of breath and 

cough . Asked if he preferred being interviewed in Italian or English and he 

said it didn't matter.


History of Present Illness: 





85F admitted through [facility] ER for evaluation of SOB and cough.  he is being 

treated for PNA / Asthma/COPD and still complains of SOB.  He has a history of 

respiratory arrest per EMR in  as he was undergoing outpatient cystoscopy. 

He is noted to be anemic.  In review of the [facility] [employer] system he has had 

anemia from 2015.  It is unclear if he has ever had a GI work-up and the 

patient himself is uncertain.  His anemia is now microcytic.  Serum iron was 

low on admission.  He denies any focal GI complaints aside from chronic 

constipation, which he describes as having a bowel movement every 3-4 days. 

There has been no reported rectal bleeding or melena.  He smoked cogarettes 

daily and describes drinking wine daily as well as 2 cups of whiskey "sometimes

". He gives a history of previous PUD that required surgery for bleeding in 

. There is no family history of colorectal cancer or other GI malignancy. 

Home med list includes ASA 81mg daily. 








- History Source


History Provided By: Patient, Medical Record


Limitations to Obtaining History: No Limitations





- Past Medical History


Cardio/Vascular: Yes: HTN, Other (ASHD/SMOKER)


Pulmonary: Yes: COPD


Renal/: Yes: Cancer (bladder cancer- transitional cell), Other (PAST PROSTATE 

CA)


Endocrine: Yes: Diabetes Mellitus





- Past Surgical History


Additional Surgical History: History for bleeding PUD 





- Alcohol/Substance Use


Hx Alcohol Use: Yes (wine daily, whiskey as well)


History of Substance Use: reports: None





- Smoking History


Smoking history: Current every day smoker


Have you smoked in the past 12 months: Yes


Aproximately how many cigarettes per day: 30





- Social History


Usual Living Arrangement: With Child


ADL: Independent


Occupation: Retired machinest?


Place of Birth: Other ([place]


Came to [place]year[place] 


History of Recent Travel: No





Home Medications





- Allergies


Allergies/Adverse Reactions: 


 Allergies











Allergy/AdvReac Type Severity Reaction Status Date / Time


 


No Known Allergies Allergy   Verified 18 08:44














- Home Medications


Home Medications: 


Ambulatory Orders





Aspirin [Aspirin EC] 81 mg PO DAILY 18 


Diltiazem HCl [Cartia Xt] 120 mg PO DAILY 18 


Montelukast Sodium [Singulair] 10 mg PO DAILY 18 


Tamsulosin HCl 0.4 mg PO DAILY 18 











Family Disease History





- Family Disease History


Family Disease History: Other: Father (: unclear causes), Mother (: 

unclear causes), Brother (1, healthy), Sister (1, healthy), Son (3, 1  age 

30 unclear causes), Daughter (2, 1  age 35 of unclear cancer)





Review of Systems





- Review of Systems


Constitutional: reports: Weakness.  denies: Fever, Unintentional Wgt. Loss


Cardiovascular: reports: Shortness of Breath.  denies: Chest Pain


Respiratory: reports: Cough, SOB on Exertion


Gastrointestinal: reports: Constipation.  denies: Abdominal Pain, Diarrhea, 

Dysphagia, Melena, Nausea, Rectal Bleeding, Vomiting, Vomiting Blood





Physical Exam-GI


Vital Signs: 


 Vital Signs











Temperature  98.2 F   02/15/18 06:00


 


Pulse Rate  97 H  02/15/18 06:00


 


Respiratory Rate  20   02/15/18 06:00


 


Blood Pressure  116/56   02/15/18 06:00


 


O2 Sat by Pulse Oximetry (%)  96   18 21:00











Constitutional: Yes: Calm


Eyes: No: Sclera Icterus


Cardiovascular: Yes: Regular Rate and Rhythm.  No: Murmur


Respiratory: Yes: Rhonchi (left lung base), Wheezes (expiratory wheezing 

bilaterally)


Gastrointestinal Inspection: Yes: Scars (midline vertical upper abdominal 

surgical scar).  No: Distention


...Auscultate: Yes: Normoactive Bowel Sounds


...Palpate: No: Hepatomegaly, Splenomegaly, Tenderness


...Percussion: No: Tympanitic


...Rectal Exam: Yes: Other (No external lesions, no masses, light brown stool 

in rectal vault, guaiac negative)


Edema: No (No LE edema)


Neurological: Yes: Alert, Oriented


Labs: 


 CBC, BMP





 02/15/18 06:00 





 02/15/18 06:00 





 INR, PTT











INR  1.05  (0.82-1.09)   18  10:19    








 Hepatic Panel











Total Bilirubin  0.4 mg/dL (0.2-1.0)   18  10:19    


 


AST  12 U/L (15-37)  L D 18  10:19    


 


ALT  20 U/L (12-78)   18  10:19    


 


Alkaline Phosphatase  112 U/L ()   18  10:19    


 


Albumin  3.5 g/dl (3.4-5.0)   18  10:19    














Problem List





- Problems


(1) Anemia


Assessment/Plan: 


Microcytic anemia


Without history of overt bleeding and FOBT negative on my exam


Iron indices are low and it appears as though his anemia is chronic nature 

dating back to at least  in review of [facility].  Unclear if he has had a 

previous GI work-up and Mr. Arndt could not clarify this for me.





Plan:


1. If he has had previous GI work-up, records should be reviewed as to who 

performed them / when they were performed prior to another work-up.  I called 

his son as well to discuss this with him as well and plan for potential EGD/

Colonoscopy and left my office number to discuss things further


2. Awaiting CT scan of the abdomen and pelvis results.  This was performed today


3. When cleared from cardiopulmonary status (especially given previous adverse 

event during a procedure in the past), EGD and colonoscopy could be performed 

to exclude potential bleeding sources such as bleeding blood vessels, polyps, 

cancers of the intestinal tract and also to assess what type of upper GI 

surgery he had performed for bleeding PUD in past (? partial colectomy).  This 

was discussed in English as Mr. Arndt stated that he did not care if we 

discussed things in either English or Italian.   We discussed potential risks of 

the procedure like but not limited to bleeding, perforation requiring surgery 

to repair, infection and sedation medication effects all of which could be 

potentially life threatening.  He stated "whatever we want to do is ok".  I 

left a message to discuss things with his son as well


4. consider heme evaluation


5. PPI therapy while on daily ASA and corticosteroid therapy        


Code(s): D64.9 - ANEMIA, UNSPECIFIED

## 2018-02-15 NOTE — PN
Progress Note (short form)





- Note


Progress Note: 


No acute events overnight.  Congested cough. 


No CP . 


Weight loss workup ongoing. 





 Intake & Output











 02/12/18 02/13/18 02/14/18 02/15/18





 23:59 23:59 23:59 23:59


 


Intake Total  300 1280 


 


Balance  300 1280 


 


Weight  130 lb  








 Last Vital Signs











Temp Pulse Resp BP Pulse Ox


 


 98.2 F   97 H  20   116/56   96 


 


 02/15/18 06:00  02/15/18 06:00  02/15/18 06:00  02/15/18 06:00  02/14/18 21:00








Active Medications





Acetaminophen (Tylenol -)  650 mg PO Q6H PRN


   PRN Reason: PAIN LEVEL 4 - 6


Albuterol Sulfate (Ventolin 0.083% Nebulizer Soln -)  1 amp NEB Q4H PRN


   PRN Reason: SHORT OF BREATH/WHEEZING


   Last Admin: 02/14/18 05:20 Dose:  1 amp


Albuterol/Ipratropium (Duoneb -)  1 amp NEB RTID [facility]


   Last Admin: 02/15/18 09:05 Dose:  1 amp


Aspirin (Ecotrin -)  81 mg PO DAILY [facility]


   Last Admin: 02/14/18 10:00 Dose:  81 mg


Diltiazem HCl (Cardizem Cd -)  120 mg PO DAILY [facility]


   Last Admin: 02/14/18 10:00 Dose:  120 mg


Azithromycin 500 mg/ Dextrose  250 mls @ 250 mls/hr IVPB DAILY [facility]


   Last Admin: 02/14/18 11:28 Dose:  250 mls/hr


CEFTRIAXONE 1 G/50 ML PREMIX (Ceftriaxone 1 Gm-D5w Bag)  50 mls @ 100 mls/hr 

IVPB DAILY [facility]


   Last Admin: 02/14/18 09:59 Dose:  100 mls/hr


Insulin Aspart (Novolog Vial Sliding Scale -)  1 vial SQ BID@0700,1630 [facility]


   PRN Reason: Protocol


   Last Admin: 02/15/18 06:05 Dose:  4 units


Methylprednisolone Sodium Succinate (Solu-Medrol -)  40 mg IVPUSH Q6H-IV [facility]


   Last Admin: 02/15/18 02:36 Dose:  40 mg


Montelukast Sodium (Singulair -)  10 mg PO HS [facility]


   Last Admin: 02/14/18 21:31 Dose:  10 mg


Nicotine (Nicoderm Patch -)  21 mg TD DAILY [facility]


   Last Admin: 02/14/18 09:58 Dose:  21 mg


Nicotine Polacrilex (Nicorette Gum -)  4 mg BUC Q2H PRN


   PRN Reason: NICOTINE REPLACEMENT RX


   Last Admin: 02/15/18 04:20 Dose:  4 mg


Tamsulosin HCl (Flomax -)  0.4 mg PO DAILY@0830 [facility]


   Last Admin: 02/14/18 10:00 Dose:  0.4 mg











Constitutional: Yes: No Distress,Thin


Eyes: Yes: WNL, Conjunctiva Clear, EOM Intact


HENT: Yes: WNL, Atraumatic, Normocephalic


Neck: Yes: Supple, Trachea Midline, Lymphadenopathy (Right cervical)


Cardiovascular: Yes: WNL, Tachycardia, S1, S2


Respiratory: Yes: Cough, basilar rhonchi, SOB, scattered expiratory Wheezes 


Gastrointestinal: Yes: WNL, Normal Bowel Sounds, Soft.  No: Splenomegaly, 

Tenderness


Edema: No


Neurological: Yes: WNL, Alert, Oriented


Labs: 





 Laboratory Results - last 24 hr











  02/14/18 02/14/18 02/15/18





  06:00 13:30 06:00


 


WBC    9.5


 


RBC    4.11


 


Hgb    8.6 L


 


Hct    28.0 L


 


MCV    68.1 L


 


MCH    20.9 L


 


MCHC    30.6 L


 


RDW    18.4 H


 


Plt Count    236


 


MPV    7.3 L


 


Neutrophils %    89.5 H


 


Lymphocytes %    8.6


 


Monocytes %    1.8 L


 


Eosinophils %    0.0


 


Basophils %    0.1


 


ESR    6


 


Retic Count    1.20


 


Sodium   


 


Potassium   


 


Chloride   


 


Carbon Dioxide   


 


Anion Gap   


 


BUN   


 


Creatinine   


 


POC Glucometer   


 


Random Glucose   


 


Lactic Acid   4.0 H* 


 


Calcium   


 


Iron  15 L  


 


TIBC  445  


 


Iron Saturation  3 L  














  02/15/18 02/15/18





  06:00 06:03


 


WBC  


 


RBC  


 


Hgb  


 


Hct  


 


MCV  


 


MCH  


 


MCHC  


 


RDW  


 


Plt Count  


 


MPV  


 


Neutrophils %  


 


Lymphocytes %  


 


Monocytes %  


 


Eosinophils %  


 


Basophils %  


 


ESR  


 


Retic Count  


 


Sodium  138 


 


Potassium  4.3 


 


Chloride  102 


 


Carbon Dioxide  28 


 


Anion Gap  8 


 


BUN  26 H 


 


Creatinine  0.6 L 


 


POC Glucometer   201


 


Random Glucose  165 H D 


 


Lactic Acid  


 


Calcium  7.8 L 


 


Iron  


 


TIBC  


 


Iron Saturation  











Assessment/Plan


AE of COPD 


Unexplained weight loss


Active smoker 


Stable lung nodules (dominant lesion:: ZULMA 6 mm: stable since May 2016)


Acute on chronic bronchitis 





Wean Medrol 


BD TX 


O2 as needed


Zithromax 


Monitor off Rocephin 


Outpatient follow up of nodules 


VTE prophylaxis 


Work up fpr significant weight loss in progress 





Dr Hawkins

## 2018-02-15 NOTE — PN
Progress Note, Physician


Chief Complaint: 





in bed feels better less SOB less cough; consults appreciated and d/w pt





- Current Medication List


Current Medications: 


Active Medications





Acetaminophen (Tylenol -)  650 mg PO Q6H PRN


   PRN Reason: PAIN LEVEL 4 - 6


Albuterol Sulfate (Ventolin 0.083% Nebulizer Soln -)  1 amp NEB Q4H PRN


   PRN Reason: SHORT OF BREATH/WHEEZING


   Last Admin: 02/14/18 05:20 Dose:  1 amp


Albuterol/Ipratropium (Duoneb -)  1 amp NEB RTID [facility]placed [facility]


   Last Admin: 02/14/18 20:47 Dose:  1 amp


Aspirin (Ecotrin -)  81 mg PO DAILY [facility]


   Last Admin: 02/14/18 10:00 Dose:  81 mg


Diltiazem HCl (Cardizem Cd -)  120 mg PO DAILY [facility]


   Last Admin: 02/14/18 10:00 Dose:  120 mg


Azithromycin 500 mg/ Dextrose  250 mls @ 250 mls/hr IVPB DAILY [facility]


   Last Admin: 02/14/18 11:28 Dose:  250 mls/hr


CEFTRIAXONE 1 G/50 ML PREMIX (Ceftriaxone 1 Gm-D5w Bag)  50 mls @ 100 mls/hr 

IVPB DAILY [facility]


   Last Admin: 02/14/18 09:59 Dose:  100 mls/hr


Insulin Aspart (Novolog Vial Sliding Scale -)  1 vial SQ BID@0700,1630 Replaced by [facility]


   PRN Reason: Protocol


   Last Admin: 02/15/18 06:05 Dose:  4 units


Methylprednisolone Sodium Succinate (Solu-Medrol -)  40 mg IVPUSH Q6H-IV [facility]


   Last Admin: 02/15/18 02:36 Dose:  40 mg


Montelukast Sodium (Singulair -)  10 mg PO HS [facility]placed [facility]


   Last Admin: 02/14/18 21:31 Dose:  10 mg


Nicotine (Nicoderm Patch -)  21 mg TD DAILY [facility]placed [facility]


   Last Admin: 02/14/18 09:58 Dose:  21 mg


Nicotine Polacrilex (Nicorette Gum -)  4 mg BUC Q2H PRN


   PRN Reason: NICOTINE REPLACEMENT RX


   Last Admin: 02/15/18 04:20 Dose:  4 mg


Tamsulosin HCl (Flomax -)  0.4 mg PO DAILY@0830 Replaced by [facility]


   Last Admin: 02/14/18 10:00 Dose:  0.4 mg











- Objective


Vital Signs: 


 Vital Signs











Temperature  98.2 F   02/15/18 06:00


 


Pulse Rate  97 H  02/15/18 06:00


 


Respiratory Rate  20   02/15/18 06:00


 


Blood Pressure  116/56   02/15/18 06:00


 


O2 Sat by Pulse Oximetry (%)  96   02/14/18 21:00











Constitutional: Yes: No Distress, Calm


Eyes: Yes: Conjunctiva Clear


HENT: Yes: Atraumatic


Neck: Yes: Supple


Cardiovascular: Yes: Regular Rate and Rhythm


Respiratory: Yes: CTA Bilaterally


Gastrointestinal: Yes: Soft.  No: Distention, Tenderness


Genitourinary: No: CVA Tenderness - Left, CVA Tenderness - Right


Musculoskeletal: No: Joint Stiffness, Joint Swelling


Extremities: No: Cold, Cool, Cyanosis


Edema: No


Integumentary: No: Rash, Venous Stasis Changes


Neurological: Yes: WNL, Alert, Oriented


...Motor Strength: WNL


Psychiatric: Yes: WNL, Alert, Oriented.  No: Agitated, Suicidal Ideation


Labs: 


 INR, PTT











INR  1.05  (0.82-1.09)   02/13/18  10:19    














- ....Imaging


Other: Report Reviewed





Assessment/Plan





85 year old M with pmh of bladder CA, COPD (not on home O2), lung nodules (last 

CT 11/2017), HTN, DM, and current everyday smoker (2 ppd x >60 yrs) presented 

with worsening SOB, difficulty breathing x 3 months worse for the last few days 

without any improvement and cough with yellow sputum x 2 months. Patient 

endorses 60 lb weight loss over since last year


admitted with PNA and acute COPD exac


IV steroids, IV antibiotics, nebs, pulm eval


weight loss, anemia: needs further w/u r/o malignancy


strongly advised stop smoking


falls, DVT, aspiration decubs PFX d/w pt and staff

## 2018-02-16 LAB
ALBUMIN SERPL-MCNC: 3.5 G/DL (ref 3.4–5)
ALP SERPL-CCNC: 91 U/L (ref 45–117)
ALT SERPL-CCNC: 37 U/L (ref 12–78)
ANION GAP SERPL CALC-SCNC: 10 MMOL/L (ref 8–16)
AST SERPL-CCNC: 21 U/L (ref 15–37)
BASOPHILS # BLD: 0.1 % (ref 0–2)
BILIRUB SERPL-MCNC: 0.5 MG/DL (ref 0.2–1)
BUN SERPL-MCNC: 31 MG/DL (ref 7–18)
CALCIUM SERPL-MCNC: 7.9 MG/DL (ref 8.5–10.1)
CHLORIDE SERPL-SCNC: 97 MMOL/L (ref 98–107)
CO2 SERPL-SCNC: 30 MMOL/L (ref 21–32)
CREAT SERPL-MCNC: 0.7 MG/DL (ref 0.7–1.3)
DEPRECATED RDW RBC AUTO: 18.4 % (ref 11.9–15.9)
EOSINOPHIL # BLD: 0 % (ref 0–4.5)
GLUCOSE SERPL-MCNC: 172 MG/DL (ref 74–106)
HCT VFR BLD CALC: 32.6 % (ref 35.4–49)
HGB BLD-MCNC: 9.7 GM/DL (ref 11.7–16.9)
LYMPHOCYTES # BLD: 7.5 % (ref 8–40)
MCH RBC QN AUTO: 20.4 PG (ref 25.7–33.7)
MCHC RBC AUTO-ENTMCNC: 29.8 G/DL (ref 32–35.9)
MCV RBC: 68.5 FL (ref 80–96)
MONOCYTES # BLD AUTO: 3.3 % (ref 3.8–10.2)
NEUTROPHILS # BLD: 89.1 % (ref 42.8–82.8)
PLATELET # BLD AUTO: 303 K/MM3 (ref 134–434)
PMV BLD: 7.3 FL (ref 7.5–11.1)
POTASSIUM SERPLBLD-SCNC: 4.7 MMOL/L (ref 3.5–5.1)
PROT SERPL-MCNC: 6.7 G/DL (ref 6.4–8.2)
RBC # BLD AUTO: 4.76 M/MM3 (ref 4–5.6)
SODIUM SERPL-SCNC: 137 MMOL/L (ref 136–145)
WBC # BLD AUTO: 12 K/MM3 (ref 4–10)

## 2018-02-16 RX ADMIN — ASPIRIN SCH MG: 81 TABLET, COATED ORAL at 10:07

## 2018-02-16 RX ADMIN — NICOTINE SCH MG: 21 PATCH TRANSDERMAL at 10:08

## 2018-02-16 RX ADMIN — PANTOPRAZOLE SODIUM SCH MG: 20 TABLET, DELAYED RELEASE ORAL at 10:07

## 2018-02-16 RX ADMIN — IPRATROPIUM BROMIDE AND ALBUTEROL SULFATE SCH AMP: .5; 3 SOLUTION RESPIRATORY (INHALATION) at 07:35

## 2018-02-16 RX ADMIN — INSULIN ASPART SCH UNITS: 100 INJECTION, SOLUTION INTRAVENOUS; SUBCUTANEOUS at 06:00

## 2018-02-16 RX ADMIN — METHYLPREDNISOLONE SODIUM SUCCINATE SCH MG: 40 INJECTION, POWDER, FOR SOLUTION INTRAMUSCULAR; INTRAVENOUS at 10:08

## 2018-02-16 RX ADMIN — HEPARIN SODIUM SCH UNIT: 5000 INJECTION, SOLUTION INTRAVENOUS; SUBCUTANEOUS at 10:07

## 2018-02-16 RX ADMIN — AZITHROMYCIN DIHYDRATE SCH MLS/HR: 500 INJECTION, POWDER, LYOPHILIZED, FOR SOLUTION INTRAVENOUS at 11:07

## 2018-02-16 RX ADMIN — TAMSULOSIN HYDROCHLORIDE SCH MG: 0.4 CAPSULE ORAL at 10:07

## 2018-02-16 RX ADMIN — IPRATROPIUM BROMIDE AND ALBUTEROL SULFATE SCH AMP: .5; 3 SOLUTION RESPIRATORY (INHALATION) at 21:00

## 2018-02-16 RX ADMIN — IPRATROPIUM BROMIDE AND ALBUTEROL SULFATE SCH AMP: .5; 3 SOLUTION RESPIRATORY (INHALATION) at 14:05

## 2018-02-16 RX ADMIN — CEFTRIAXONE SCH MLS/HR: 1 INJECTION, SOLUTION INTRAVENOUS at 10:07

## 2018-02-16 RX ADMIN — INSULIN ASPART SCH UNITS: 100 INJECTION, SOLUTION INTRAVENOUS; SUBCUTANEOUS at 17:03

## 2018-02-16 NOTE — PN
Progress Note, Physician


Chief Complaint: 





DYSPNEA/cough improved


History of Present Illness: 





REVIEWED





- Current Medication List


Current Medications: 


Active Medications





Acetaminophen (Tylenol -)  650 mg PO Q6H PRN


   PRN Reason: PAIN LEVEL 4 - 6


   Last Admin: 02/16/18 02:03 Dose:  650 mg


Albuterol Sulfate (Ventolin 0.083% Nebulizer Soln -)  1 amp NEB Q4H PRN


   PRN Reason: SHORT OF BREATH/WHEEZING


   Last Admin: 02/14/18 05:20 Dose:  1 amp


Albuterol/Ipratropium (Duoneb -)  1 amp NEB RTID [facility]


   Last Admin: 02/15/18 21:05 Dose:  1 amp


Aspirin (Ecotrin -)  81 mg PO DAILY [facility]


   Last Admin: 02/16/18 10:07 Dose:  81 mg


Diltiazem HCl (Cardizem Cd -)  120 mg PO DAILY [facility]


   Last Admin: 02/16/18 10:07 Dose:  120 mg


Heparin Sodium (Porcine) (Heparin -)  5,000 unit SQ BID [facility]


   Last Admin: 02/16/18 10:07 Dose:  5,000 unit


Azithromycin 500 mg/ Dextrose  250 mls @ 250 mls/hr IVPB DAILY [facility]


   Last Admin: 02/15/18 13:42 Dose:  250 mls/hr


CEFTRIAXONE 1 G/50 ML PREMIX (Ceftriaxone 1 Gm-D5w Bag)  50 mls @ 100 mls/hr 

IVPB DAILY [facility]


   Last Admin: 02/16/18 10:07 Dose:  100 mls/hr


Insulin Aspart (Novolog Vial Sliding Scale -)  1 vial SQ BID@0700,1630 [facility]


   PRN Reason: Protocol


   Last Admin: 02/16/18 06:00 Dose:  4 units


Montelukast Sodium (Singulair -)  10 mg PO HS [facility]


   Last Admin: 02/15/18 22:38 Dose:  10 mg


Nicotine (Nicoderm Patch -)  21 mg TD DAILY [facility]


   Last Admin: 02/16/18 10:08 Dose:  21 mg


Nicotine Polacrilex (Nicorette Gum -)  4 mg BUC Q2H PRN


   PRN Reason: NICOTINE REPLACEMENT RX


   Last Admin: 02/15/18 14:40 Dose:  4 mg


Pantoprazole Sodium (Protonix -)  20 mg PO DAILY [facility]


   Last Admin: 02/16/18 10:07 Dose:  20 mg


Prednisone (Deltasone -)  40 mg PO DAILY [facility]


Tamsulosin HCl (Flomax -)  0.4 mg PO DAILY@0830 [facility]


   Last Admin: 02/16/18 10:07 Dose:  0.4 mg











- Objective


Vital Signs: 


 Vital Signs











Temperature  98.0 F   02/16/18 06:00


 


Pulse Rate  95 H  02/16/18 06:00


 


Respiratory Rate  20   02/16/18 06:00


 


Blood Pressure  119/68   02/16/18 06:00


 


O2 Sat by Pulse Oximetry (%)  96   02/15/18 21:00











Constitutional: Yes: Calm


Eyes: Yes: EOM Intact


HENT: Yes: Normocephalic


Neck: Yes: Trachea Midline


Cardiovascular: Yes: S1, S2


Respiratory: Yes: Diminished


Gastrointestinal: Yes: Soft


Edema: No


Neurological: Yes: Alert


Labs: 


 CBC, BMP





 02/16/18 06:00 





 02/16/18 06:00 





 INR, PTT











INR  1.05  (0.82-1.09)   02/13/18  10:19    














- ....Imaging


Chest X-ray: Report Reviewed, Image Reviewed


Cat Scan: Report Reviewed, Image Reviewed


EKG: Report Reviewed





Problem List





- Problems


(1) Acute exacerbation of chronic obstructive pulmonary disease (COPD)


Code(s): J44.1 - CHRONIC OBSTRUCTIVE PULMONARY DISEASE W (ACUTE) EXACERBATION   





(2) COPD exacerbation


Code(s): J44.1 - CHRONIC OBSTRUCTIVE PULMONARY DISEASE W (ACUTE) EXACERBATION   





(3) Pneumonia


Code(s): J18.9 - PNEUMONIA, UNSPECIFIED ORGANISM   


Qualifiers: 


   Pneumonia type: due to unspecified organism   Laterality: left   Lung 

location: lower lobe of lung   Qualified Code(s): J18.1 - Lobar pneumonia, 

unspecified organism   





(4) Anemia


Code(s): D64.9 - ANEMIA, UNSPECIFIED   





(5) HTN (hypertension)


Code(s): I10 - ESSENTIAL (PRIMARY) HYPERTENSION   





Assessment/Plan








#COPD exacerbation with L basilar consolidation/atelectasis


-IV steroids changed to oral


-Antibiotics [Ceftriaxone/Azithromycin], as per pmd


-Bronchodilators


-O2 as needed


 6 mm nodule in the ZULMA that has remained essentially unchanged   


VTE prophylaxis 


Work up fpr significant weight loss suggested 


Smoking cessation


discharge planning








EMI WILLIAM MD

## 2018-02-16 NOTE — PN
Progress Note (short form)





- Note


Progress Note: 





Patient seen and examined





States breathing has  improved on steroids and antibioticsImprssio





Anemia  work up compatible  with Fe++ deficiecy  with  3% Fe++ saturation and 

serum  Fe++ of 15. 


B-12 elevated --?? etiology and normal folate


Seen by GI and will need assessment in future.





 Last Vital Signs











Temp Pulse Resp BP Pulse Ox


 


 98.0 F   95 H  20   119/68   96 


 


 02/16/18 06:00  02/16/18 06:00  02/16/18 06:00  02/16/18 06:00  02/15/18 21:00











HEENT: no icterus


Oropharynx: No thrush, No mucositis


Cor: RSR, No murmurs, No gallops


Lungs: diminished breath sounds bilaterally


Abd: Soft, Normal bowel sounds, No organomegaly


Ext:No significant edema


Skin: No rashes, Integument intact


 CBC, BMP





 02/16/18 06:00 





 02/16/18 06:00 





 Current Medications











Generic Name Dose Route Start Last Admin





  Trade Name Freq  PRN Reason Stop Dose Admin


 


Acetaminophen  650 mg  02/13/18 14:35  02/16/18 02:03





  Tylenol -  PO   650 mg





  Q6H PRN   Administration





  PAIN LEVEL 4 - 6   


 


Albuterol Sulfate  1 amp  02/13/18 15:03  02/14/18 05:20





  Ventolin 0.083% Nebulizer Soln -  NEB   1 amp





  Q4H PRN   Administration





  SHORT OF BREATH/WHEEZING   


 


Albuterol/Ipratropium  1 amp  02/13/18 20:00  02/16/18 07:35





  Duoneb -  NEB   1 amp





  RTID SAM   Administration


 


Aspirin  81 mg  02/14/18 10:00  02/16/18 10:07





  Ecotrin -  PO   81 mg





  DAILY SAM   Administration


 


Diltiazem HCl  120 mg  02/14/18 10:00  02/16/18 10:07





  Cardizem Cd -  PO   120 mg





  DAILY [facility]   Administration


 


Heparin Sodium (Porcine)  5,000 unit  02/15/18 22:00  02/16/18 10:07





  Heparin -  SQ   5,000 unit





  BID SAM   Administration


 


Azithromycin 500 mg/ Dextrose  250 mls @ 250 mls/hr  02/13/18 12:30  02/16/18 11

:07





  IVPB   250 mls/hr





  DAILY [facility]   Administration


 


CEFTRIAXONE 1 G/50 ML PREMIX  50 mls @ 100 mls/hr  02/13/18 12:30  02/16/18 10:

07





  Ceftriaxone 1 Gm-D5w Bag  IVPB   100 mls/hr





  DAILY [facility]   Administration


 


Insulin Aspart  1 vial  02/13/18 16:30  02/16/18 06:00





  Novolog Vial Sliding Scale -  SQ   4 units





  BID@0700,1630 [facility]   Administration





  Protocol   


 


Montelukast Sodium  10 mg  02/13/18 22:00  02/15/18 22:38





  Singulair -  PO   10 mg





  HS SAM   Administration


 


Nicotine  21 mg  02/13/18 18:15  02/16/18 10:08





  Nicoderm Patch -  TD   21 mg





  DAILY [facility]   Administration


 


Nicotine Polacrilex  4 mg  02/14/18 21:45  02/15/18 14:40





  Nicorette Gum -  BUC   4 mg





  Q2H PRN   Administration





  NICOTINE REPLACEMENT RX   


 


Pantoprazole Sodium  20 mg  02/15/18 13:30  02/16/18 10:07





  Protonix -  PO   20 mg





  DAILY [facility]   Administration


 


Prednisone  40 mg  02/16/18 20:00  





  Deltasone -  PO   





  DAILY [facility]   


 


Tamsulosin HCl  0.4 mg  02/14/18 08:30  02/16/18 10:07





  Flomax -  PO   0.4 mg





  DAILY@0830 [facility]   Administration














Impression:





Exacerbation of COPD- antibiotics and steroids


Anemia- Fe++ deficiecny - GI work up when feasible


Pulmonary nodules- out patient  f/u


Weight loss -as above

## 2018-02-16 NOTE — PN
Progress Note, Physician


Chief Complaint: 





reports and consults reviewed


pt feels better wants to go home; is on nicotine patch; d/w pt strongly advised 

to stop smoking


will d/w pulm for DC planning


check pre and post O2 /RA (although d./w pt can NOT smoke if he has home O2, 

risk of explosion)





- Current Medication List


Current Medications: 


Active Medications





Acetaminophen (Tylenol -)  650 mg PO Q6H PRN


   PRN Reason: PAIN LEVEL 4 - 6


   Last Admin: 02/16/18 02:03 Dose:  650 mg


Albuterol Sulfate (Ventolin 0.083% Nebulizer Soln -)  1 amp NEB Q4H PRN


   PRN Reason: SHORT OF BREATH/WHEEZING


   Last Admin: 02/14/18 05:20 Dose:  1 amp


Albuterol/Ipratropium (Duoneb -)  1 amp NEB RTID [facility]


   Last Admin: 02/15/18 21:05 Dose:  1 amp


Aspirin (Ecotrin -)  81 mg PO DAILY [facility]


   Last Admin: 02/15/18 11:00 Dose:  81 mg


Diltiazem HCl (Cardizem Cd -)  120 mg PO DAILY [facility]


   Last Admin: 02/15/18 11:00 Dose:  120 mg


Heparin Sodium (Porcine) (Heparin -)  5,000 unit SQ BID [facility]


   Last Admin: 02/15/18 22:38 Dose:  5,000 unit


Azithromycin 500 mg/ Dextrose  250 mls @ 250 mls/hr IVPB DAILY [facility]


   Last Admin: 02/15/18 13:42 Dose:  250 mls/hr


CEFTRIAXONE 1 G/50 ML PREMIX (Ceftriaxone 1 Gm-D5w Bag)  50 mls @ 100 mls/hr 

IVPB DAILY [facility]


   Last Admin: 02/15/18 11:01 Dose:  100 mls/hr


Insulin Aspart (Novolog Vial Sliding Scale -)  1 vial SQ BID@0700,1630 [facility]


   PRN Reason: Protocol


   Last Admin: 02/16/18 06:00 Dose:  4 units


Methylprednisolone Sodium Succinate (Solu-Medrol -)  40 mg IVPUSH BID [facility]


   Last Admin: 02/15/18 22:38 Dose:  40 mg


Montelukast Sodium (Singulair -)  10 mg PO HS [facility]


   Last Admin: 02/15/18 22:38 Dose:  10 mg


Nicotine (Nicoderm Patch -)  21 mg TD DAILY [facility]


   Last Admin: 02/15/18 11:01 Dose:  21 mg


Nicotine Polacrilex (Nicorette Gum -)  4 mg BUC Q2H PRN


   PRN Reason: NICOTINE REPLACEMENT RX


   Last Admin: 02/15/18 14:40 Dose:  4 mg


Pantoprazole Sodium (Protonix -)  20 mg PO DAILY [facility]


   Last Admin: 02/15/18 13:42 Dose:  20 mg


Tamsulosin HCl (Flomax -)  0.4 mg PO DAILY@0830 [facility]


   Last Admin: 02/15/18 11:00 Dose:  0.4 mg











- Objective


Vital Signs: 


 Vital Signs











Temperature  98.3 F   02/15/18 22:00


 


Pulse Rate  86   02/15/18 22:00


 


Respiratory Rate  20   02/15/18 22:00


 


Blood Pressure  123/72   02/15/18 22:00


 


O2 Sat by Pulse Oximetry (%)  96   02/15/18 21:00











Constitutional: Yes: No Distress, Calm


Eyes: Yes: Conjunctiva Clear


HENT: Yes: Atraumatic


Neck: Yes: Supple


Cardiovascular: Yes: Regular Rate and Rhythm


Respiratory: Yes: CTA Bilaterally


Gastrointestinal: Yes: Soft.  No: Distention, Tenderness


Genitourinary: No: CVA Tenderness - Left, CVA Tenderness - Right


Musculoskeletal: No: Joint Stiffness, Joint Swelling


Extremities: No: Cold, Cool, Cyanosis


Edema: No


Integumentary: No: Rash, Venous Stasis Changes


Neurological: Yes: WNL, Alert, Oriented


...Motor Strength: WNL


Psychiatric: Yes: WNL, Alert, Oriented.  No: Agitated, Suicidal Ideation


Labs: 


 CBC, BMP





 02/15/18 06:00 





 02/15/18 06:00 





 INR, PTT











INR  1.05  (0.82-1.09)   02/13/18  10:19    














- ....Imaging


Other: Report Reviewed





Assessment/Plan





85 year old M with pmh of bladder CA, COPD (not on home O2), lung nodules (last 

CT 11/2017), HTN, DM, and current everyday smoker (2 ppd x >60 yrs) presented 

with worsening SOB, difficulty breathing x 3 months worse for the last few days 

without any improvement and cough with yellow sputum x 2 months. Patient 

endorses 60 lb weight loss over since last year


admitted with PNA and acute COPD exac


 steroids, IV antibiotics, nebs, pulm f/u


weight loss, anemia: needs further w/u r/o malignancy; needs outpt GI w/u for 

FABIENNE, pulmonary and heme onc f/u also to see cardiology as outpt f/u to r/o ASHD


chest abd pelvic CT noted; needs chest CT outpt f/u per pulmonary


again strongly advised stop smoking, will order home NRT/ patch; pt to f/u with 

pulmonary for further treatment (chantix? wellbutryn?)


falls, DVT, aspiration decubs PFX 


d/w pt and staff and pt's family

## 2018-02-17 LAB
ALBUMIN SERPL-MCNC: 3.1 G/DL (ref 3.4–5)
ALP SERPL-CCNC: 75 U/L (ref 45–117)
ALT SERPL-CCNC: 39 U/L (ref 12–78)
ANION GAP SERPL CALC-SCNC: 8 MMOL/L (ref 8–16)
AST SERPL-CCNC: 20 U/L (ref 15–37)
BASOPHILS # BLD: 0.1 % (ref 0–2)
BILIRUB SERPL-MCNC: 0.4 MG/DL (ref 0.2–1)
BUN SERPL-MCNC: 26 MG/DL (ref 7–18)
CALCIUM SERPL-MCNC: 7.2 MG/DL (ref 8.5–10.1)
CHLORIDE SERPL-SCNC: 99 MMOL/L (ref 98–107)
CO2 SERPL-SCNC: 30 MMOL/L (ref 21–32)
CREAT SERPL-MCNC: 0.6 MG/DL (ref 0.7–1.3)
DEPRECATED RDW RBC AUTO: 18.6 % (ref 11.9–15.9)
EOSINOPHIL # BLD: 0 % (ref 0–4.5)
GLUCOSE SERPL-MCNC: 167 MG/DL (ref 74–106)
HCT VFR BLD CALC: 27.6 % (ref 35.4–49)
HGB BLD-MCNC: 8.5 GM/DL (ref 11.7–16.9)
LYMPHOCYTES # BLD: 9.2 % (ref 8–40)
MCH RBC QN AUTO: 20.8 PG (ref 25.7–33.7)
MCHC RBC AUTO-ENTMCNC: 30.7 G/DL (ref 32–35.9)
MCV RBC: 67.7 FL (ref 80–96)
MONOCYTES # BLD AUTO: 5 % (ref 3.8–10.2)
NEUTROPHILS # BLD: 85.7 % (ref 42.8–82.8)
PLATELET # BLD AUTO: 262 K/MM3 (ref 134–434)
PMV BLD: 7.2 FL (ref 7.5–11.1)
POTASSIUM SERPLBLD-SCNC: 4.4 MMOL/L (ref 3.5–5.1)
PROT SERPL-MCNC: 5.8 G/DL (ref 6.4–8.2)
RBC # BLD AUTO: 4.07 M/MM3 (ref 4–5.6)
SODIUM SERPL-SCNC: 137 MMOL/L (ref 136–145)
WBC # BLD AUTO: 8.6 K/MM3 (ref 4–10)

## 2018-02-17 RX ADMIN — MONTELUKAST SODIUM SCH MG: 10 TABLET, COATED ORAL at 01:00

## 2018-02-17 RX ADMIN — PANTOPRAZOLE SODIUM SCH MG: 20 TABLET, DELAYED RELEASE ORAL at 10:27

## 2018-02-17 RX ADMIN — IPRATROPIUM BROMIDE AND ALBUTEROL SULFATE SCH AMP: .5; 3 SOLUTION RESPIRATORY (INHALATION) at 07:25

## 2018-02-17 RX ADMIN — TAMSULOSIN HYDROCHLORIDE SCH MG: 0.4 CAPSULE ORAL at 10:27

## 2018-02-17 RX ADMIN — PREDNISONE SCH MG: 20 TABLET ORAL at 01:00

## 2018-02-17 RX ADMIN — HEPARIN SODIUM SCH UNIT: 5000 INJECTION, SOLUTION INTRAVENOUS; SUBCUTANEOUS at 21:12

## 2018-02-17 RX ADMIN — IPRATROPIUM BROMIDE AND ALBUTEROL SULFATE SCH AMP: .5; 3 SOLUTION RESPIRATORY (INHALATION) at 14:36

## 2018-02-17 RX ADMIN — HEPARIN SODIUM SCH UNIT: 5000 INJECTION, SOLUTION INTRAVENOUS; SUBCUTANEOUS at 10:28

## 2018-02-17 RX ADMIN — IPRATROPIUM BROMIDE AND ALBUTEROL SULFATE SCH AMP: .5; 3 SOLUTION RESPIRATORY (INHALATION) at 21:00

## 2018-02-17 RX ADMIN — MONTELUKAST SODIUM SCH MG: 10 TABLET, COATED ORAL at 21:12

## 2018-02-17 RX ADMIN — INSULIN ASPART SCH: 100 INJECTION, SOLUTION INTRAVENOUS; SUBCUTANEOUS at 18:13

## 2018-02-17 RX ADMIN — CEFTRIAXONE SCH MLS/HR: 1 INJECTION, SOLUTION INTRAVENOUS at 10:28

## 2018-02-17 RX ADMIN — AZITHROMYCIN DIHYDRATE SCH MLS/HR: 500 INJECTION, POWDER, LYOPHILIZED, FOR SOLUTION INTRAVENOUS at 10:28

## 2018-02-17 RX ADMIN — HEPARIN SODIUM SCH UNIT: 5000 INJECTION, SOLUTION INTRAVENOUS; SUBCUTANEOUS at 01:00

## 2018-02-17 RX ADMIN — ASPIRIN SCH MG: 81 TABLET, COATED ORAL at 10:28

## 2018-02-17 RX ADMIN — PREDNISONE SCH MG: 20 TABLET ORAL at 10:28

## 2018-02-17 RX ADMIN — INSULIN ASPART SCH UNITS: 100 INJECTION, SOLUTION INTRAVENOUS; SUBCUTANEOUS at 06:53

## 2018-02-17 RX ADMIN — NICOTINE SCH MG: 21 PATCH TRANSDERMAL at 10:28

## 2018-02-17 NOTE — DS
Physical Examination


Vital Signs: 


 Vital Signs











Temperature  98.1 F   02/17/18 05:54


 


Pulse Rate  78   02/17/18 05:54


 


Respiratory Rate  18   02/17/18 05:54


 


Blood Pressure  125/59   02/17/18 05:54


 


O2 Sat by Pulse Oximetry (%)  94 L  02/16/18 18:15











Findings/Remarks: 





feels well wants to go home; d/w pulm dr Hawkins OK to DC home; does not 

need home O2 per pre&post/RA (>90%)


I called pt's daughter Tara and I d/w pt's wife at bedside about pt's 

condition and plan but they said they are not ready to take him home today; 


d/w pt and family about stopping smoking and about outpt f/u needed


t time 45 min


Constitutional: Yes: No Distress, Calm


Eyes: Yes: Conjunctiva Clear


HENT: Yes: Atraumatic


Neck: Yes: Supple


Cardiovascular: Yes: Regular Rate and Rhythm


Respiratory: Yes: CTA Bilaterally


Gastrointestinal: Yes: Soft.  No: Distention, Tenderness


Renal/: No: CVA Tenderness - Left, CVA Tenderness - Right


Musculoskeletal: No: Joint Stiffness, Joint Swelling


Extremities: No: Cold, Cool, Cyanosis


Edema: No


Integumentary: No: Rash, Venous Stasis Changes


Neurological: Yes: WNL, Alert, Oriented


...Motor Strength: WNL


Psychiatric: Yes: WNL, Alert, Oriented.  No: Agitated, Suicidal Ideation





Discharge Summary


Reason For Visit: PNEUMONIA


Current Active Problems





Acute exacerbation of chronic obstructive pulmonary disease (COPD) (Acute)


COPD exacerbation (Acute)


Pneumonia (Acute)








Procedures: Principal: admitted with acute COPD exac, bronchitis;


Other Procedures: IV steroids, nebs, IV antibiotics; O2 NC;.  seen by pulmonary

; also seen by GI and heme onc for weight loss and anemia


Hospital Course: 





improved with above; DC home with f/u as advised;


stop smoking


d/w pt and wife at bedside and daughter Tara/ phone


Condition: Stable





- Instructions


Diet, Activity, Other Instructions: 


f/u PCP and pulmonary drs in 1-2 weeks


f/u labs CBC CMP in 1-2 weeks


repeat chest CT in few months per pulmonary


cardiology outpt eval dr Khan r/o ASHD 


to f/u with heme onc dr Ying for anemia w/u r/o malignancy


to have GI w.u EGD colonoscopy with GI DR JUSTYN Martinez outpt r/o malignancy when 

cleared by pulm and cardiology


falls PFX


STOP smoking


RTER if worse or recurrent c/o


d/w pt and family, scripts done


Referrals: 


Albino Javier MD [Primary Care Provider] - 


Rahul Welch MD [Staff Physician] - 


José Manuel Ynig MD [Staff Physician] - 


Jonah Martinez DO [Staff Physician] - 


Gilberto Khan MD [Staff Physician] - 





- Home Medications


Comprehensive Discharge Medication List: 


Ambulatory Orders





Aspirin [Aspirin EC] 81 mg PO DAILY 02/12/18 


Diltiazem HCl [Cartia Xt] 120 mg PO DAILY 02/12/18 


Montelukast Sodium [Singulair] 10 mg PO DAILY 02/12/18 


Tamsulosin HCl 0.4 mg PO DAILY 02/12/18 


Acetaminophen [Tylenol .Regular Strength -] 650 mg PO Q6H PRN  tablet 02/17/18 


Albuterol Sulfate Inhaler - [Ventolin Hfa Inhaler -] 2 inh PO Q6H PRN #1 inh 02/ 17/18 


Nicotine Patch [Nicoderm Patch -] 1 patch TD DAILY #30 patch 02/17/18 


Pantoprazole Sodium [Protonix -] 20 mg PO DAILY #20 tablet.ec 02/17/18 


predniSONE [Deltasone -] 40 mg PO DAILY #10 tablet 02/17/18

## 2018-02-17 NOTE — PN
Progress Note (short form)





- Note


Progress Note: 


No acute events overnight.  Feels overall better. 


No CP . 





 


 Intake & Output











 02/14/18 02/15/18 02/16/18 02/17/18





 23:59 23:59 23:59 23:59


 


Intake Total 1280 400 300 


 


Balance 1280 400 300 








 Last Vital Signs











Temp Pulse Resp BP Pulse Ox


 


 98.1 F   78   18   125/59   98 


 


 02/17/18 05:54  02/17/18 05:54  02/17/18 05:54  02/17/18 05:54  02/16/18 21:00








Active Medications





Acetaminophen (Tylenol -)  650 mg PO Q6H PRN


   PRN Reason: PAIN LEVEL 4 - 6


   Last Admin: 02/16/18 02:03 Dose:  650 mg


Albuterol Sulfate (Ventolin 0.083% Nebulizer Soln -)  1 amp NEB Q4H PRN


   PRN Reason: SHORT OF BREATH/WHEEZING


   Last Admin: 02/14/18 05:20 Dose:  1 amp


Albuterol/Ipratropium (Duoneb -)  1 amp NEB RTID [facility]


   Last Admin: 02/17/18 07:25 Dose:  1 amp


Aspirin (Ecotrin -)  81 mg PO DAILY [facility]


   Last Admin: 02/17/18 10:28 Dose:  81 mg


Diltiazem HCl (Cardizem Cd -)  120 mg PO DAILY [facility]


   Last Admin: 02/17/18 10:27 Dose:  120 mg


Heparin Sodium (Porcine) (Heparin -)  5,000 unit SQ BID [facility]


   Last Admin: 02/17/18 10:28 Dose:  5,000 unit


Azithromycin 500 mg/ Dextrose  250 mls @ 250 mls/hr IVPB DAILY [facility]


   Last Admin: 02/17/18 10:28 Dose:  250 mls/hr


CEFTRIAXONE 1 G/50 ML PREMIX (Ceftriaxone 1 Gm-D5w Bag)  50 mls @ 100 mls/hr 

IVPB DAILY [facility]


   Last Admin: 02/17/18 10:28 Dose:  100 mls/hr


Insulin Aspart (Novolog Vial Sliding Scale -)  1 vial SQ BID@0700,1630 [facility]


   PRN Reason: Protocol


   Last Admin: 02/17/18 06:53 Dose:  4 units


Montelukast Sodium (Singulair -)  10 mg PO HS [facility]


   Last Admin: 02/17/18 01:00 Dose:  10 mg


Nicotine (Nicoderm Patch -)  21 mg TD DAILY [facility]


   Last Admin: 02/17/18 10:28 Dose:  21 mg


Nicotine Polacrilex (Nicorette Gum -)  4 mg BUC Q2H PRN


   PRN Reason: NICOTINE REPLACEMENT RX


   Last Admin: 02/15/18 14:40 Dose:  4 mg


Pantoprazole Sodium (Protonix -)  20 mg PO DAILY [facility]


   Last Admin: 02/17/18 10:27 Dose:  20 mg


Prednisone (Deltasone -)  40 mg PO DAILY [facility]


   Last Admin: 02/17/18 10:28 Dose:  40 mg


Tamsulosin HCl (Flomax -)  0.4 mg PO DAILY@0830 [facility]


   Last Admin: 02/17/18 10:27 Dose:  0.4 mg








Constitutional: Yes: No Distress,Thin


Eyes: Yes: WNL, Conjunctiva Clear, EOM Intact


HENT: Yes: WNL, Atraumatic, Normocephalic


Neck: Yes: Supple, Trachea Midline, Lymphadenopathy (Right cervical)


Cardiovascular: Yes: WNL, Tachycardia, S1, S2


Respiratory: Yes: Cough, basilar rhonchi, SOB, No wheezes 


Gastrointestinal: Yes: WNL, Normal Bowel Sounds, Soft.  No: Splenomegaly, 

Tenderness


Edema: No


Neurological: Yes: WNL, Alert, Oriented


Labs: 





 


 Laboratory Results - last 24 hr











  02/15/18 02/15/18 02/16/18





  06:00 06:00 16:52


 


WBC   


 


RBC   


 


Hgb   


 


Hct   


 


MCV   


 


MCH   


 


MCHC   


 


RDW   


 


Plt Count   


 


MPV   


 


Neutrophils %   


 


Lymphocytes %   


 


Monocytes %   


 


Eosinophils %   


 


Basophils %   


 


Sodium   


 


Potassium   


 


Chloride   


 


Carbon Dioxide   


 


Anion Gap   


 


BUN   


 


Creatinine   


 


Creat Clearance w eGFR   


 


POC Glucometer    202


 


Random Glucose   


 


Calcium   


 


Total Bilirubin   


 


AST   


 


ALT   


 


Alkaline Phosphatase   


 


Total Protein   


 


Total Protein (PEP)  5.8 L  


 


Albumin   


 


Albumin (PEP)  3.1  


 


Globulin  2.7  


 


Albumin/Globulin Ratio  1.1  


 


Alpha-1-Globulins (%)  Cancelled  


 


Alpha-2-Globulins (%)  Cancelled  


 


Beta Globulins  0.9  0.9 


 


Beta Globulins (%)  Cancelled  


 


Gamma Globulins (%)  Cancelled  


 


M-Nile %  Cancelled  


 


TSH   


 


Free T4   


 


BROOKE & SPEP Interp    


 


Total Protein (BROOKE)   5.6 L 


 


Albumin (BROOKE)   3.0 


 


Albumin/Globulin (BROOKE)   1.2 


 


Alpha-1-Globulins BROOKE   0.2 


 


Alpha-2-Globulins BROOKE   0.7 


 


Gamma Globulins (BROOKE)   0.9 


 


BROOKE M-Nile  Not observed  Not observed 


 


BROOKE Comments    


 


IEP IgG   835 


 


IEP IgA   153 


 


IEP IgM   65 


 


Ref Test Comments  Cancelled  














  02/17/18 02/17/18 02/17/18





  06:52 07:00 07:00


 


WBC   8.6 


 


RBC   4.07 


 


Hgb   8.5 L D 


 


Hct   27.6 L D 


 


MCV   67.7 L 


 


MCH   20.8 L 


 


MCHC   30.7 L 


 


RDW   18.6 H 


 


Plt Count   262 


 


MPV   7.2 L 


 


Neutrophils %   85.7 H 


 


Lymphocytes %   9.2  D 


 


Monocytes %   5.0 


 


Eosinophils %   0.0 


 


Basophils %   0.1 


 


Sodium    137


 


Potassium    4.4


 


Chloride    99


 


Carbon Dioxide    30


 


Anion Gap    8


 


BUN    26 H


 


Creatinine    0.6 L


 


Creat Clearance w eGFR    > 60


 


POC Glucometer  248  


 


Random Glucose    167 H


 


Calcium    7.2 L


 


Total Bilirubin    0.4


 


AST    20


 


ALT    39


 


Alkaline Phosphatase    75


 


Total Protein    5.8 L


 


Total Protein (PEP)   


 


Albumin    3.1 L


 


Albumin (PEP)   


 


Globulin   


 


Albumin/Globulin Ratio   


 


Alpha-1-Globulins (%)   


 


Alpha-2-Globulins (%)   


 


Beta Globulins   


 


Beta Globulins (%)   


 


Gamma Globulins (%)   


 


M-Nile %   


 


TSH    0.59  D


 


Free T4   


 


BROOKE & SPEP Interp   


 


Total Protein (BROOKE)   


 


Albumin (BROOKE)   


 


Albumin/Globulin (BROOKE)   


 


Alpha-1-Globulins BROOKE   


 


Alpha-2-Globulins BROOKE   


 


Gamma Globulins (BROOKE)   


 


BROOKE M-Nile   


 


BROOKE Comments   


 


IEP IgG   


 


IEP IgA   


 


IEP IgM   


 


Ref Test Comments   














  02/17/18





  07:00


 


WBC 


 


RBC 


 


Hgb 


 


Hct 


 


MCV 


 


MCH 


 


MCHC 


 


RDW 


 


Plt Count 


 


MPV 


 


Neutrophils % 


 


Lymphocytes % 


 


Monocytes % 


 


Eosinophils % 


 


Basophils % 


 


Sodium 


 


Potassium 


 


Chloride 


 


Carbon Dioxide 


 


Anion Gap 


 


BUN 


 


Creatinine 


 


Creat Clearance w eGFR 


 


POC Glucometer 


 


Random Glucose 


 


Calcium 


 


Total Bilirubin 


 


AST 


 


ALT 


 


Alkaline Phosphatase 


 


Total Protein 


 


Total Protein (PEP) 


 


Albumin 


 


Albumin (PEP) 


 


Globulin 


 


Albumin/Globulin Ratio 


 


Alpha-1-Globulins (%) 


 


Alpha-2-Globulins (%) 


 


Beta Globulins 


 


Beta Globulins (%) 


 


Gamma Globulins (%) 


 


M-Nile % 


 


TSH 


 


Free T4  0.90


 


BROOKE & SPEP Interp 


 


Total Protein (BROOKE) 


 


Albumin (BROOKE) 


 


Albumin/Globulin (BROOKE) 


 


Alpha-1-Globulins BROOKE 


 


Alpha-2-Globulins BROOKE 


 


Gamma Globulins (BROOKE) 


 


BROOKE M-Nile 


 


BROOKE Comments 


 


IEP IgG 


 


IEP IgA 


 


IEP IgM 


 


Ref Test Comments 














Assessment/Plan


AE of COPD 


Unexplained weight loss


Active smoker 


Stable lung nodules (dominant lesion:: ZULMA 6 mm: stable since May 2016)


Acute on chronic bronchitis 





Prednsione  


BD TX 


O2 as needed


Outpatient follow up of nodules 


Pre and post O2 saturation to be checked (patient is still actively smoking)  


Can D/C on a LABA/LAMA combo such as Anoro daily 








Dr Hawkins

## 2018-02-18 VITALS — TEMPERATURE: 98.3 F | SYSTOLIC BLOOD PRESSURE: 99 MMHG | HEART RATE: 103 BPM | DIASTOLIC BLOOD PRESSURE: 52 MMHG

## 2018-02-18 RX ADMIN — PREDNISONE SCH MG: 20 TABLET ORAL at 09:10

## 2018-02-18 RX ADMIN — AZITHROMYCIN DIHYDRATE SCH MLS/HR: 500 INJECTION, POWDER, LYOPHILIZED, FOR SOLUTION INTRAVENOUS at 09:14

## 2018-02-18 RX ADMIN — ASPIRIN SCH MG: 81 TABLET, COATED ORAL at 09:13

## 2018-02-18 RX ADMIN — HEPARIN SODIUM SCH UNIT: 5000 INJECTION, SOLUTION INTRAVENOUS; SUBCUTANEOUS at 09:12

## 2018-02-18 RX ADMIN — IPRATROPIUM BROMIDE AND ALBUTEROL SULFATE SCH: .5; 3 SOLUTION RESPIRATORY (INHALATION) at 14:27

## 2018-02-18 RX ADMIN — INSULIN ASPART SCH: 100 INJECTION, SOLUTION INTRAVENOUS; SUBCUTANEOUS at 06:20

## 2018-02-18 RX ADMIN — TAMSULOSIN HYDROCHLORIDE SCH MG: 0.4 CAPSULE ORAL at 09:10

## 2018-02-18 RX ADMIN — PANTOPRAZOLE SODIUM SCH MG: 20 TABLET, DELAYED RELEASE ORAL at 09:13

## 2018-02-18 RX ADMIN — NICOTINE SCH MG: 21 PATCH TRANSDERMAL at 09:11

## 2018-02-18 RX ADMIN — IPRATROPIUM BROMIDE AND ALBUTEROL SULFATE SCH AMP: .5; 3 SOLUTION RESPIRATORY (INHALATION) at 08:15

## 2018-02-18 RX ADMIN — CEFTRIAXONE SCH MLS/HR: 1 INJECTION, SOLUTION INTRAVENOUS at 09:10

## 2018-02-18 NOTE — PN
Progress Note, Physician


History of Present Illness: 





Pt w/o SOB, SOB with walking, Wheezing, CP,palpitations. Pt's cough is 

decreased.


Pt states that his smoking urge is controlled by nicotine patch and gum. 








- Current Medication List


Current Medications: 


Active Medications





Acetaminophen (Tylenol -)  650 mg PO Q6H PRN


   PRN Reason: PAIN LEVEL 4 - 6


   Last Admin: 02/16/18 02:03 Dose:  650 mg


Albuterol Sulfate (Ventolin 0.083% Nebulizer Soln -)  1 amp NEB Q4H PRN


   PRN Reason: SHORT OF BREATH/WHEEZING


   Last Admin: 02/14/18 05:20 Dose:  1 amp


Albuterol/Ipratropium (Duoneb -)  1 amp NEB RTID [facility]


   Last Admin: 02/18/18 08:15 Dose:  1 amp


Aspirin (Ecotrin -)  81 mg PO DAILY [facility]


   Last Admin: 02/18/18 09:13 Dose:  81 mg


Diltiazem HCl (Cardizem Cd -)  120 mg PO DAILY [facility]


   Last Admin: 02/18/18 09:10 Dose:  120 mg


Heparin Sodium (Porcine) (Heparin -)  5,000 unit SQ BID [facility]


   Last Admin: 02/18/18 09:12 Dose:  5,000 unit


Azithromycin 500 mg/ Dextrose  250 mls @ 250 mls/hr IVPB DAILY [facility]


   Last Admin: 02/18/18 09:14 Dose:  250 mls/hr


CEFTRIAXONE 1 G/50 ML PREMIX (Ceftriaxone 1 Gm-D5w Bag)  50 mls @ 100 mls/hr 

IVPB DAILY [facility]


   Last Admin: 02/18/18 09:10 Dose:  100 mls/hr


Insulin Aspart (Novolog Vial Sliding Scale -)  1 vial SQ BID@0700,1630 [facility]


   PRN Reason: Protocol


   Last Admin: 02/18/18 06:20 Dose:  Not Given


Montelukast Sodium (Singulair -)  10 mg PO HS [facility]


   Last Admin: 02/17/18 21:12 Dose:  10 mg


Nicotine (Nicoderm Patch -)  21 mg TD DAILY [facility]


   Last Admin: 02/18/18 09:11 Dose:  21 mg


Nicotine Polacrilex (Nicorette Gum -)  4 mg BUC Q2H PRN


   PRN Reason: NICOTINE REPLACEMENT RX


   Last Admin: 02/15/18 14:40 Dose:  4 mg


Pantoprazole Sodium (Protonix -)  20 mg PO DAILY [facility]


   Last Admin: 02/18/18 09:13 Dose:  20 mg


Prednisone (Deltasone -)  40 mg PO DAILY [facility]


   Last Admin: 02/18/18 09:10 Dose:  40 mg


Tamsulosin HCl (Flomax -)  0.4 mg PO DAILY@0830 [facility]


   Last Admin: 02/18/18 09:10 Dose:  0.4 mg











- Objective


Vital Signs: 


 Vital Signs











Temperature  98.3 F   02/18/18 10:00


 


Pulse Rate  103 H  02/18/18 10:00


 


Respiratory Rate  18   02/18/18 10:00


 


Blood Pressure  99/52   02/18/18 10:00


 


O2 Sat by Pulse Oximetry (%)  96   02/17/18 21:00











Constitutional: Yes: No Distress, Calm


Cardiovascular: Yes: Regular Rate and Rhythm, S1, S2


Respiratory: Yes: Regular, Rhonchi (minimal, scattered).  No: Wheezes


Gastrointestinal: Yes: Normal Bowel Sounds, Soft.  No: Tenderness


Edema: No


Neurological: Yes: Alert, Oriented


Labs: 


 CBC, BMP





 02/17/18 07:00 





 02/17/18 07:00 





 INR, PTT











INR  1.05  (0.82-1.09)   02/13/18  10:19    














Problem List





- Problems


(1) Pneumonia


Code(s): J18.9 - PNEUMONIA, UNSPECIFIED ORGANISM   


Qualifiers: 


   Pneumonia type: due to unspecified organism   Laterality: left   Lung 

location: lower lobe of lung   Qualified Code(s): J18.1 - Lobar pneumonia, 

unspecified organism   





(2) Acute exacerbation of chronic obstructive pulmonary disease (COPD)


Code(s): J44.1 - CHRONIC OBSTRUCTIVE PULMONARY DISEASE W (ACUTE) EXACERBATION   





(3) DM2 (diabetes mellitus, type 2)


Code(s): E11.9 - TYPE 2 DIABETES MELLITUS WITHOUT COMPLICATIONS   





(4) HTN (hypertension)


Code(s): I10 - ESSENTIAL (PRIMARY) HYPERTENSION   





Assessment/Plan





IV abtx


IV steroids were changed to Prednisone


Pulmonary, GI, Heme consult and f/u appreciated.


Pt wants to go home; to Dc home today with outpat f/u with PCP, Pulmonary, GI, 

Heme.


I reviewed with pt importance of continuing nicotine patch and gum, not to 

smoke at all, to comply with ADA diet, to call for follow-up appointments. Pt 

aggress with all recommendations- pt's nurse was a bed side.


I reviewed with his nurse Nicotine gum use: maximum 4 times per day.


I reviewed with pt's son ( came to pick him up, at bed side) about his father 

DC plan- including smoking.


Time spent for coordinating pt's care: over 40 minutes.

## 2018-02-18 NOTE — PN
Progress Note (short form)





- Note


Progress Note: 


Family apparently refused to take patient home due to social issues. 


No acute events overnight. 


Feels overall better. 


No CP . 





 


 


 Intake & Output











 02/15/18 02/16/18 02/17/18 02/18/18





 23:59 23:59 23:59 23:59


 


Intake Total  500


 


Balance  500








 Last Vital Signs











Temp Pulse Resp BP Pulse Ox


 


 98.3 F   103 H  18   99/52   96 


 


 02/18/18 10:00  02/18/18 10:00  02/18/18 10:00  02/18/18 10:00  02/17/18 21:00








Active Medications





Acetaminophen (Tylenol -)  650 mg PO Q6H PRN


   PRN Reason: PAIN LEVEL 4 - 6


   Last Admin: 02/16/18 02:03 Dose:  650 mg


Albuterol Sulfate (Ventolin 0.083% Nebulizer Soln -)  1 amp NEB Q4H PRN


   PRN Reason: SHORT OF BREATH/WHEEZING


   Last Admin: 02/14/18 05:20 Dose:  1 amp


Albuterol/Ipratropium (Duoneb -)  1 amp NEB RTID [facility]


   Last Admin: 02/18/18 08:15 Dose:  1 amp


Aspirin (Ecotrin -)  81 mg PO DAILY [facility]


   Last Admin: 02/18/18 09:13 Dose:  81 mg


Diltiazem HCl (Cardizem Cd -)  120 mg PO DAILY [facility]


   Last Admin: 02/18/18 09:10 Dose:  120 mg


Heparin Sodium (Porcine) (Heparin -)  5,000 unit SQ BID [facility]


   Last Admin: 02/18/18 09:12 Dose:  5,000 unit


Azithromycin 500 mg/ Dextrose  250 mls @ 250 mls/hr IVPB DAILY [facility]


   Last Admin: 02/18/18 09:14 Dose:  250 mls/hr


CEFTRIAXONE 1 G/50 ML PREMIX (Ceftriaxone 1 Gm-D5w Bag)  50 mls @ 100 mls/hr 

IVPB DAILY [facility]


   Last Admin: 02/18/18 09:10 Dose:  100 mls/hr


Insulin Aspart (Novolog Vial Sliding Scale -)  1 vial SQ BID@0700,1630 [facility]


   PRN Reason: Protocol


   Last Admin: 02/18/18 06:20 Dose:  Not Given


Montelukast Sodium (Singulair -)  10 mg PO HS [facility]


   Last Admin: 02/17/18 21:12 Dose:  10 mg


Nicotine (Nicoderm Patch -)  21 mg TD DAILY [facility]


   Last Admin: 02/18/18 09:11 Dose:  21 mg


Nicotine Polacrilex (Nicorette Gum -)  4 mg BUC Q2H PRN


   PRN Reason: NICOTINE REPLACEMENT RX


   Last Admin: 02/15/18 14:40 Dose:  4 mg


Pantoprazole Sodium (Protonix -)  20 mg PO DAILY [facility]


   Last Admin: 02/18/18 09:13 Dose:  20 mg


Prednisone (Deltasone -)  40 mg PO DAILY [facility]


   Last Admin: 02/18/18 09:10 Dose:  40 mg


Tamsulosin HCl (Flomax -)  0.4 mg PO DAILY@0830 [facility]


   Last Admin: 02/18/18 09:10 Dose:  0.4 mg











Constitutional: Yes: No Distress,Thin


Eyes: Yes: WNL, Conjunctiva Clear, EOM Intact


HENT: Yes: WNL, Atraumatic, Normocephalic


Neck: Yes: Supple, Trachea Midline, Lymphadenopathy (Right cervical)


Cardiovascular: Yes: WNL, Tachycardia, S1, S2


Respiratory: Yes: Cough, basilar rhonchi, SOB, No wheezes 


Gastrointestinal: Yes: WNL, Normal Bowel Sounds, Soft.  No: Splenomegaly, 

Tenderness


Edema: No


Neurological: Yes: WNL, Alert, Oriented


Labs: 





 


 


 Laboratory Results - last 24 hr











  02/18/18





  05:20


 


POC Glucometer  114











Assessment/Plan


AE of COPD 


Unexplained weight loss


Active smoker 


Stable lung nodules (dominant lesion:: ZULMA 6 mm: stable since May 2016)


Acute on chronic bronchitis 





Prednsione  


BD TX 


O2 as needed


Outpatient follow up of nodules 


Pre and post O2 saturation to be checked (patient is still actively smoking)  


D/C on a LABA/LAMA combo such as Anoro daily 








Dr Hawkins

## 2018-02-21 LAB — PROT UR-MCNC: 13.8 MG/DL

## 2018-02-28 ENCOUNTER — HOSPITAL ENCOUNTER (INPATIENT)
Dept: HOSPITAL 74 - JER | Age: 83
LOS: 2 days | Discharge: HOME | DRG: 191 | End: 2018-03-02
Attending: SPECIALIST | Admitting: SPECIALIST
Payer: COMMERCIAL

## 2018-02-28 VITALS — BODY MASS INDEX: 18.6 KG/M2

## 2018-02-28 DIAGNOSIS — F17.210: ICD-10-CM

## 2018-02-28 DIAGNOSIS — J44.1: Primary | ICD-10-CM

## 2018-02-28 DIAGNOSIS — E11.9: ICD-10-CM

## 2018-02-28 DIAGNOSIS — Z87.11: ICD-10-CM

## 2018-02-28 DIAGNOSIS — I25.10: ICD-10-CM

## 2018-02-28 DIAGNOSIS — Z85.51: ICD-10-CM

## 2018-02-28 DIAGNOSIS — F10.10: ICD-10-CM

## 2018-02-28 DIAGNOSIS — Z85.46: ICD-10-CM

## 2018-02-28 DIAGNOSIS — I45.2: ICD-10-CM

## 2018-02-28 DIAGNOSIS — R68.0: ICD-10-CM

## 2018-02-28 DIAGNOSIS — I10: ICD-10-CM

## 2018-02-28 LAB
ALBUMIN SERPL-MCNC: 3.1 G/DL (ref 3.4–5)
ALP SERPL-CCNC: 96 U/L (ref 45–117)
ALT SERPL-CCNC: 38 U/L (ref 12–78)
ANION GAP SERPL CALC-SCNC: 6 MMOL/L (ref 8–16)
ANISOCYTOSIS BLD QL: (no result)
APPEARANCE UR: CLEAR
AST SERPL-CCNC: 18 U/L (ref 15–37)
BILIRUB SERPL-MCNC: 0.3 MG/DL (ref 0.2–1)
BILIRUB UR STRIP.AUTO-MCNC: NEGATIVE MG/DL
BUN SERPL-MCNC: 19 MG/DL (ref 7–18)
CALCIUM SERPL-MCNC: 7.3 MG/DL (ref 8.5–10.1)
CHLORIDE SERPL-SCNC: 103 MMOL/L (ref 98–107)
CO2 SERPL-SCNC: 28 MMOL/L (ref 21–32)
COLOR UR: (no result)
CREAT SERPL-MCNC: 0.6 MG/DL (ref 0.7–1.3)
DEPRECATED RDW RBC AUTO: 19.4 % (ref 11.9–15.9)
GLUCOSE SERPL-MCNC: 112 MG/DL (ref 74–106)
HCT VFR BLD CALC: 28.6 % (ref 35.4–49)
HGB BLD-MCNC: 8.7 GM/DL (ref 11.7–16.9)
KETONES UR QL STRIP: (no result)
LEUKOCYTE ESTERASE UR QL STRIP.AUTO: NEGATIVE
MCH RBC QN AUTO: 20.7 PG (ref 25.7–33.7)
MCHC RBC AUTO-ENTMCNC: 30.3 G/DL (ref 32–35.9)
MCV RBC: 68.1 FL (ref 80–96)
NITRITE UR QL STRIP: NEGATIVE
OVALOCYTES BLD QL SMEAR: (no result)
PH UR: 5 [PH] (ref 5–8)
PLATELET # BLD AUTO: 310 K/MM3 (ref 134–434)
PLATELET BLD QL SMEAR: ADEQUATE
PMV BLD: 6.8 FL (ref 7.5–11.1)
POTASSIUM SERPLBLD-SCNC: 4 MMOL/L (ref 3.5–5.1)
PROT SERPL-MCNC: 6.2 G/DL (ref 6.4–8.2)
PROT UR QL STRIP: NEGATIVE
PROT UR QL STRIP: NEGATIVE
RBC # BLD AUTO: 4.2 M/MM3 (ref 4–5.6)
RBC # UR STRIP: NEGATIVE /UL
RBC MORPH BLD: YES
SODIUM SERPL-SCNC: 137 MMOL/L (ref 136–145)
SP GR UR: 1.02 (ref 1–1.03)
TARGETS BLD QL SMEAR: (no result)
UROBILINOGEN UR STRIP-MCNC: 2 MG/DL (ref 0.2–1)
WBC # BLD AUTO: 12.6 K/MM3 (ref 4–10)

## 2018-03-01 LAB
ANION GAP SERPL CALC-SCNC: 6 MMOL/L (ref 8–16)
BUN SERPL-MCNC: 14 MG/DL (ref 7–18)
CALCIUM SERPL-MCNC: 7.5 MG/DL (ref 8.5–10.1)
CHLORIDE SERPL-SCNC: 101 MMOL/L (ref 98–107)
CO2 SERPL-SCNC: 29 MMOL/L (ref 21–32)
CREAT SERPL-MCNC: 0.6 MG/DL (ref 0.7–1.3)
DEPRECATED RDW RBC AUTO: 18.5 % (ref 11.9–15.9)
GLUCOSE SERPL-MCNC: 170 MG/DL (ref 74–106)
HCT VFR BLD CALC: 28 % (ref 35.4–49)
HGB BLD-MCNC: 8.5 GM/DL (ref 11.7–16.9)
MCH RBC QN AUTO: 20.5 PG (ref 25.7–33.7)
MCHC RBC AUTO-ENTMCNC: 30.4 G/DL (ref 32–35.9)
MCV RBC: 67.5 FL (ref 80–96)
PH BLDV: 7.34 [PH] (ref 7.32–7.42)
PLATELET # BLD AUTO: 308 K/MM3 (ref 134–434)
PMV BLD: 6.8 FL (ref 7.5–11.1)
POTASSIUM SERPLBLD-SCNC: 4.4 MMOL/L (ref 3.5–5.1)
RBC # BLD AUTO: 4.15 M/MM3 (ref 4–5.6)
SODIUM SERPL-SCNC: 136 MMOL/L (ref 136–145)
VENOUS PC02: 52 MMHG (ref 38–52)
VENOUS PO2: 29 MMHG (ref 28–48)
WBC # BLD AUTO: 12.4 K/MM3 (ref 4–10)

## 2018-03-01 RX ADMIN — PANTOPRAZOLE SODIUM SCH MG: 20 TABLET, DELAYED RELEASE ORAL at 09:53

## 2018-03-01 RX ADMIN — NICOTINE SCH MG: 21 PATCH TRANSDERMAL at 12:05

## 2018-03-01 RX ADMIN — METHYLPREDNISOLONE SODIUM SUCCINATE SCH MG: 40 INJECTION, POWDER, FOR SOLUTION INTRAMUSCULAR; INTRAVENOUS at 21:22

## 2018-03-01 RX ADMIN — METHYLPREDNISOLONE SODIUM SUCCINATE SCH MG: 40 INJECTION, POWDER, FOR SOLUTION INTRAMUSCULAR; INTRAVENOUS at 03:39

## 2018-03-01 RX ADMIN — NICOTINE POLACRILEX PRN MG: 4 GUM, CHEWING ORAL at 22:30

## 2018-03-01 RX ADMIN — METHYLPREDNISOLONE SODIUM SUCCINATE SCH MG: 40 INJECTION, POWDER, FOR SOLUTION INTRAMUSCULAR; INTRAVENOUS at 16:00

## 2018-03-01 RX ADMIN — MONTELUKAST SODIUM SCH MG: 10 TABLET, COATED ORAL at 21:22

## 2018-03-01 RX ADMIN — METHYLPREDNISOLONE SODIUM SUCCINATE SCH MG: 40 INJECTION, POWDER, FOR SOLUTION INTRAMUSCULAR; INTRAVENOUS at 12:06

## 2018-03-01 RX ADMIN — NICOTINE POLACRILEX PRN MG: 4 GUM, CHEWING ORAL at 17:54

## 2018-03-01 RX ADMIN — IPRATROPIUM BROMIDE AND ALBUTEROL SULFATE SCH AMP: .5; 3 SOLUTION RESPIRATORY (INHALATION) at 15:36

## 2018-03-01 RX ADMIN — MONTELUKAST SODIUM SCH: 10 TABLET, COATED ORAL at 16:03

## 2018-03-01 RX ADMIN — IPRATROPIUM BROMIDE AND ALBUTEROL SULFATE SCH AMP: .5; 3 SOLUTION RESPIRATORY (INHALATION) at 20:39

## 2018-03-01 RX ADMIN — ASPIRIN SCH MG: 81 TABLET, COATED ORAL at 09:53

## 2018-03-01 NOTE — EKG
Test Reason : 

Blood Pressure : ***/*** mmHG

Vent. Rate : 072 BPM     Atrial Rate : 072 BPM

   P-R Int : 112 ms          QRS Dur : 126 ms

    QT Int : 434 ms       P-R-T Axes : 067 080 068 degrees

   QTc Int : 475 ms

 

NORMAL SINUS RHYTHM

NON-SPECIFIC INTRA-VENTRICULAR CONDUCTION BLOCK

ABNORMAL ECG

WHEN COMPARED WITH ECG OF 13-FEB-2018 12:16,

VENT. RATE HAS DECREASED BY  41 BPM

NON-SPECIFIC INTRA-VENTRICULAR CONDUCTION BLOCK HAS REPLACED RIGHT

BUNDLE BRANCH BLOCK

Confirmed by AIDAN ROME MD ([id number]) on 3/1/2018 11:39:52 AM

 

Referred By:             Confirmed By:AIDAN ROME MD

## 2018-03-01 NOTE — HP
Admitting History and Physical





- Primary Care Physician


PCP: Albino Javier





- Admission


Chief Complaint: Fall.  SOB


History of Present Illness: 





Pt states that came to ER because he fall (unwitnessed) at home, unclear the 

cause of fall; pt states that he is fine and want to go home, he admits that 

restarted smoking.


Per pt's son, Des ( called him to clarify the reason for ER visit), pt fell 

and passed out for few seconds ( he found his confused); pt lately is getting 

progressively more SOB;  pt restarted smoking few days after left the hospital.


History Source: Patient, Family Member, Medical Record (ER staff)


Limitations to Obtaining History: No Limitations





- Past Medical History


Cardiovascular: Yes: HTN, Other (ASHD)


Pulmonary: Yes: COPD, Other (current smoker)


Renal/: Yes: Cancer (bladder cancer- transitional cell), Other (PAST PROSTATE 

CA)


Endocrine: Yes: Diabetes Mellitus





- Smoking History


Smoking history: Current every day smoker


Have you smoked in the past 12 months: Yes


Aproximately how many cigarettes per day: 70 (70 years or more)





- Alcohol/Substance Use


Hx Alcohol Use: Yes (wine daily, whiskey as well)


History of Substance Use: reports: None





- Social History


ADL: Independent


Occupation: Retired machinest?


History of Recent Travel: No





Home Medications





- Allergies


Allergies/Adverse Reactions: 


 Allergies











Allergy/AdvReac Type Severity Reaction Status Date / Time


 


No Known Allergies Allergy   Verified 18 08:44














- Home Medications


Home Medications: 


Ambulatory Orders





Aspirin [Aspirin EC] 81 mg PO DAILY 18 


Diltiazem HCl [Cartia Xt] 120 mg PO DAILY 18 


Montelukast Sodium [Singulair] 10 mg PO DAILY 18 


Acetaminophen [Tylenol .Regular Strength -] 650 mg PO Q6H PRN  tablet 18 


Albuterol 0.083% Nebulizer Sol [Ventolin 0.083% Nebulizer Soln -] 1 neb NEB Q6H 

PRN #30 vial 18 


Pantoprazole Sodium [Protonix -] 20 mg PO DAILY #20 tablet.ec 18 


Umeclidinium Brm/Vilanterol Tr [Anoro Ellipta 62.5-25 Mcg INH] 1 each IH DAILY #

1 blst.w.dev MDD 1 18 


Cefpodoxime Proxetil [Vantin -] 200 mg PO Q12H #10 tablet MDD 2 18 











Family Disease History





- Family Disease History


Family Disease History: Other: Father (: unclear causes), Mother (: 

unclear causes), Brother (1, healthy), Sister (1, healthy), Son (3, 1  age 

30 unclear causes), Daughter (2, 1  age 35 of unclear cancer)





Review of Systems





- Review of Systems


Constitutional: denies: Chills, Fever


Eyes: denies: Blurred Vision, Double Vision


HENT: denies: Ear Discharge, Nasal Congestion, Throat Pain


Neck: denies: Pain on Movement, Stiffness, Tenderness


Cardiovascular: denies: Chest Pain, Edema, Palpitations


Respiratory: reports: Cough, SOB on Exertion


Gastrointestinal: denies: Abdominal Pain, Diarrhea, Nausea


Genitourinary: denies: Burning, Discharge, Frequency


Musculoskeletal: denies: Back Pain, Muscle Pain


Integumentary: denies: Blister, Bruising


Neurological: denies: Change in LOC, Change in Speech, Confusion, Dizziness, 

Headache, Incoordination, Numbness, Syncope, Unsteady Gait, Weakness


Endocrine: denies: Excessive Sweating, Intolerance to Cold


Hematology/Lymphatic: denies: Easily Bruised, Excessive Bleeding


Psychiatric: denies: Altered Sleep Pattern, Anxiety





Physical Examination


Vital Signs: 


 Vital Signs











Temperature  97.2 F L  18 21:27


 


Pulse Rate  89   18 06:33


 


Respiratory Rate  18   18 06:33


 


Blood Pressure  120/71   18 06:33


 


O2 Sat by Pulse Oximetry (%)  100   18 06:33











Constitutional: Yes: Anxious (as wants to go home)


Eyes: Yes: Conjunctiva Clear, EOM Intact, PERRL


HENT: Yes: Normocephalic.  No: Epistaxis, Pharyngeal Erythema, Rhinnorhea


Neck: Yes: Trachea Midline.  No: Lymphadenopathy, Thyromegaly


Cardiovascular: Yes: Regular Rate and Rhythm, S1, S2


Respiratory: Yes: Regular.  No: Rhonchi, Wheezes


Gastrointestinal: Yes: Normal Bowel Sounds, Soft.  No: Tenderness


...Rectal Exam: Yes: Deferred


Renal/: No: CVA Tenderness - Left, CVA Tenderness - Right


Musculoskeletal: No: Back Pain, Joint Stiffness, Joint Swelling


Edema: No


Neurological: Yes: Alert, Oriented, Cran Nerves II-XII Intact


Psychiatric: Yes: Alert, Oriented


Labs: 


 CBC, BMP





 18 06:45 





 18 06:45 











Imaging





- Results


Chest X-ray: Report Reviewed


Cat Scan: Report Reviewed





Problem List





- Problems


(1) Acute exacerbation of chronic obstructive pulmonary disease (COPD)


Code(s): J44.1 - CHRONIC OBSTRUCTIVE PULMONARY DISEASE W (ACUTE) EXACERBATION   





(2) Fall


Code(s): W19.XXXA - UNSPECIFIED FALL, INITIAL ENCOUNTER   





(3) DM2 (diabetes mellitus, type 2)


Code(s): E11.9 - TYPE 2 DIABETES MELLITUS WITHOUT COMPLICATIONS   





(4) HTN (hypertension)


Code(s): I10 - ESSENTIAL (PRIMARY) HYPERTENSION   





Assessment/Plan





Unclear if pt hit his head; Head CT scan is negative for acute event.


IV Steroids


Stop Smoking


Pulmonary, Cardio, Neuro consult


AM labs

## 2018-03-01 NOTE — CON.PULM
Consult


Consult Specialty:: PULMONARY


Referred by:: Dr. Javier


Reason for Consultation:: shortness of breath





- History of Present Illness


Chief Complaint: s/p fall


History of Present Illness: 





86yo male with h/o HTN, DM, COPD, h/o bladder ca, current long time smoker who 

presents s/p fall without head trauma. No acute findings on CT head but noted 

to be severely short of breath by treating physician in the ER. Pt reports 

being short of breath for "months." +nonproductive cough and wheezing. No fevers

, chills or sweats. States he only takes nebulizer treatments at home but Anoro 

listed on his home meds. He continues to smoke 2 PPD, started in his teens. 

Worked as a  with asbestos exposure. Had a CT chest done in 2018 which shows a spiculated RUL mass with emphysematous changes and other 

scattered nodules which have been stable since 2016. 








- History Source


History Provided By: Patient, Medical Record


Limitations to Obtaining History: Clinical Condition





- Past Medical History


Cardio/Vascular: Yes: HTN, Other (ASHD/SMOKER)


Pulmonary: Yes: COPD


Renal/: Yes: Cancer (bladder cancer- transitional cell), Other (PAST PROSTATE 

CA)


Endocrine: Yes: Diabetes Mellitus





- Alcohol/Substance Use


Hx Alcohol Use: Yes (wine daily, whiskey as well)


History of Substance Use: reports: None





- Smoking History


Smoking history: Current every day smoker


Have you smoked in the past 12 months: Yes


Aproximately how many cigarettes per day: 40





- Social History


Usual Living Arrangement: With Child


ADL: Independent


Occupation: Retired machinest?


History of Recent Travel: No





Home Medications





- Allergies


Allergies/Adverse Reactions: 


 Allergies











Allergy/AdvReac Type Severity Reaction Status Date / Time


 


No Known Allergies Allergy   Verified 18 08:44














- Home Medications


Home Medications: 


Ambulatory Orders





Aspirin [Aspirin EC] 81 mg PO DAILY 18 


Diltiazem HCl [Cartia Xt] 120 mg PO DAILY 18 


Montelukast Sodium [Singulair] 10 mg PO DAILY 18 


Acetaminophen [Tylenol .Regular Strength -] 650 mg PO Q6H PRN  tablet 18 


Albuterol 0.083% Nebulizer Sol [Ventolin 0.083% Nebulizer Soln -] 1 neb NEB Q6H 

PRN #30 vial 18 


Pantoprazole Sodium [Protonix -] 20 mg PO DAILY #20 tablet.ec 18 


Umeclidinium Brm/Vilanterol Tr [Anoro Ellipta 62.5-25 Mcg INH] 1 each IH DAILY #

1 blst.w.dev MDD 1 18 


Cefpodoxime Proxetil [Vantin -] 200 mg PO Q12H #10 tablet MDD 2 18 











Family Disease History





- Family Disease History


Family Disease History: Other: Father (: unclear causes), Mother (: 

unclear causes), Brother (1, healthy), Sister (1, healthy), Son (3, 1  age 

30 unclear causes), Daughter (2, 1  age 35 of unclear cancer)





Review of Systems





- Review of Systems


Constitutional: reports: Weakness


Eyes: denies: Recent Change in Vision


HENT: denies: Nasal Congestion, Throat Pain


Neck: denies: Stiffness, Tenderness


Cardiovascular: reports: Shortness of Breath


Respiratory: reports: Cough, Wheezing.  denies: Hemoptysis


Gastrointestinal: denies: Abdominal Pain, Nausea, Vomiting


Genitourinary: denies: Dysuria, Hematuria


Neurological: denies: Dizziness, Headache





Physical Exam


Vital Sings: 


 Vital Signs











Temperature  98.6 F   18 13:24


 


Pulse Rate  98 H  18 13:24


 


Respiratory Rate  18   18 13:24


 


Blood Pressure  106/89   18 13:24


 


O2 Sat by Pulse Oximetry (%)  98   18 13:24











Constitutional: Yes: Mild Distress


Eyes: Yes: Conjunctiva Clear, EOM Intact


HENT: Yes: Atraumatic, Normocephalic


Neck: Yes: Supple, Trachea Midline


Cardiovascular: Yes: Regular Rate and Rhythm


Respiratory: Yes: Poor Air Entry, Wheezes


...Clubbing: No


Gastrointestinal: Yes: Normal Bowel Sounds, Soft.  No: Tenderness


Edema: No


Neurological: Yes: Alert, Oriented


Labs: 


 CBC, BMP





 18 06:45 





 18 06:45 





 ABG Results











ABG pH  Cancelled   18  23:45    


 


ABG pCO2 at Pt Temp  Cancelled   18  23:45    


 


ABG pO2 at Pt Temp  Cancelled   18  23:45    


 


ABG HCO3  Cancelled   18  23:45    


 


ABG O2 Sat (Measured)  Cancelled   18  23:45    


 


ABG O2 Content  Cancelled   18  23:45    


 


ABG Base Excess  Cancelled   18  23:45    














Imaging





- Results


Chest X-ray: Report Reviewed, Image Reviewed


Cat Scan: Report Reviewed, Image Reviewed





Problem List





- Problems


(1) Acute exacerbation of chronic obstructive pulmonary disease (COPD)


Code(s): J44.1 - CHRONIC OBSTRUCTIVE PULMONARY DISEASE W (ACUTE) EXACERBATION   





(2) DM2 (diabetes mellitus, type 2)


Code(s): E11.9 - TYPE 2 DIABETES MELLITUS WITHOUT COMPLICATIONS   





(3) HTN (hypertension)


Code(s): I10 - ESSENTIAL (PRIMARY) HYPERTENSION   





(4) Emphysema of lung


Code(s): J43.9 - EMPHYSEMA, UNSPECIFIED   





Assessment/Plan





s/p Fall


Acute COPD Exacerbation


Emphysema


Lung Mass


HTN


DM


Heavy Smoker





-  IV medrol


-  inhaled bronchodilators standing and PRN


-  O2 to keep SpO2 >90%


-  would obtain outpt PET scan if not done already


-  encourage smoking cessation


-  DVT prophylaxis





Thank you for this consult


Mariano Argueta MD

## 2018-03-02 VITALS — SYSTOLIC BLOOD PRESSURE: 111 MMHG | DIASTOLIC BLOOD PRESSURE: 53 MMHG | HEART RATE: 95 BPM | TEMPERATURE: 98.1 F

## 2018-03-02 LAB
ANION GAP SERPL CALC-SCNC: 11 MMOL/L (ref 8–16)
BUN SERPL-MCNC: 21 MG/DL (ref 7–18)
CALCIUM SERPL-MCNC: 8.1 MG/DL (ref 8.5–10.1)
CHLORIDE SERPL-SCNC: 101 MMOL/L (ref 98–107)
CO2 SERPL-SCNC: 25 MMOL/L (ref 21–32)
CREAT SERPL-MCNC: 0.6 MG/DL (ref 0.7–1.3)
DEPRECATED RDW RBC AUTO: 18.2 % (ref 11.9–15.9)
GLUCOSE SERPL-MCNC: 231 MG/DL (ref 74–106)
HCT VFR BLD CALC: 25.8 % (ref 35.4–49)
HGB BLD-MCNC: 7.9 GM/DL (ref 11.7–16.9)
MCH RBC QN AUTO: 20.6 PG (ref 25.7–33.7)
MCHC RBC AUTO-ENTMCNC: 30.7 G/DL (ref 32–35.9)
MCV RBC: 67.1 FL (ref 80–96)
PLATELET # BLD AUTO: 311 K/MM3 (ref 134–434)
PMV BLD: 7.1 FL (ref 7.5–11.1)
POTASSIUM SERPLBLD-SCNC: 4 MMOL/L (ref 3.5–5.1)
RBC # BLD AUTO: 3.85 M/MM3 (ref 4–5.6)
SODIUM SERPL-SCNC: 137 MMOL/L (ref 136–145)
WBC # BLD AUTO: 14.3 K/MM3 (ref 4–10)

## 2018-03-02 RX ADMIN — IPRATROPIUM BROMIDE AND ALBUTEROL SULFATE SCH AMP: .5; 3 SOLUTION RESPIRATORY (INHALATION) at 12:16

## 2018-03-02 RX ADMIN — NICOTINE SCH MG: 21 PATCH TRANSDERMAL at 09:42

## 2018-03-02 RX ADMIN — METHYLPREDNISOLONE SODIUM SUCCINATE SCH MG: 40 INJECTION, POWDER, FOR SOLUTION INTRAMUSCULAR; INTRAVENOUS at 09:41

## 2018-03-02 RX ADMIN — PANTOPRAZOLE SODIUM SCH MG: 20 TABLET, DELAYED RELEASE ORAL at 09:41

## 2018-03-02 RX ADMIN — NICOTINE POLACRILEX PRN MG: 4 GUM, CHEWING ORAL at 11:18

## 2018-03-02 RX ADMIN — ASPIRIN SCH MG: 81 TABLET, COATED ORAL at 09:41

## 2018-03-02 RX ADMIN — METHYLPREDNISOLONE SODIUM SUCCINATE SCH MG: 40 INJECTION, POWDER, FOR SOLUTION INTRAMUSCULAR; INTRAVENOUS at 02:21

## 2018-03-02 RX ADMIN — IPRATROPIUM BROMIDE AND ALBUTEROL SULFATE SCH AMP: .5; 3 SOLUTION RESPIRATORY (INHALATION) at 07:41

## 2018-03-02 NOTE — CONSULT
Consult - text type





- Consultation


Consultation Note: 





 Neurology





Admitting History and Physical


History of Present Illness: 


84 y/o M with HTN, COPD, Bladder CA, DM, who came to ER due to fall at home, 

unclear etiology and no focal deficits. He is currently without complaints and 

CT head completed and did not show any structural abnormalities. Is ambulating 

with and was without ataxia but spoke to nurse and he seemed unsteady earlier 

getting of bed. Admitted for further syncope workup, neurologically appears to 

be at baseline. 





- Past Medical History


Cardiovascular: Yes: HTN, Other (ASHD)


Pulmonary: Yes: COPD, Other (current smoker)


Renal/: Yes: Cancer (bladder cancer- transitional cell), Other (PAST PROSTATE 

CA)


Endocrine: Yes: Diabetes Mellitus





- Smoking History


Smoking history: Current every day smoker


Have you smoked in the past 12 months: Yes


Aproximately how many cigarettes per day: 70 (70 years or more)





- Alcohol/Substance Use


Hx Alcohol Use: Yes (wine daily, whiskey as well)


History of Substance Use: reports: None





- Social History


ADL: Independent


Occupation: Retired machinest?


History of Recent Travel: No





Home Medications





- Allergies


Allergies/Adverse Reactions: 


 Allergies











Allergy/AdvReac Type Severity Reaction Status Date / Time


 


No Known Allergies Allergy   Verified 18 08:44














- Home Medications


Home Medications: 


Ambulatory Orders





Aspirin [Aspirin EC] 81 mg PO DAILY 18 


Diltiazem HCl [Cartia Xt] 120 mg PO DAILY 18 


Montelukast Sodium [Singulair] 10 mg PO DAILY 18 


Acetaminophen [Tylenol .Regular Strength -] 650 mg PO Q6H PRN  tablet 18 


Albuterol 0.083% Nebulizer Sol [Ventolin 0.083% Nebulizer Soln -] 1 neb NEB Q6H 

PRN #30 vial 18 


Pantoprazole Sodium [Protonix -] 20 mg PO DAILY #20 tablet.ec 18 


Umeclidinium Brm/Vilanterol Tr [Anoro Ellipta 62.5-25 Mcg INH] 1 each IH DAILY #

1 blst.w.dev MDD 1 18 


Cefpodoxime Proxetil [Vantin -] 200 mg PO Q12H #10 tablet MDD 2 18 











Family Disease History





- Family Disease History


Family Disease History: Other: Father (: unclear causes), Mother (: 

unclear causes), Brother (1, healthy), Sister (1, healthy), Son (3, 1  age 

30 unclear causes), Daughter (2, 1  age 35 of unclear cancer)





Review of Systems





- Review of Systems


Constitutional: denies: Chills, Fever


Eyes: denies: Blurred Vision, Double Vision


HENT: denies: Ear Discharge, Nasal Congestion, Throat Pain


Neck: denies: Pain on Movement, Stiffness, Tenderness


Cardiovascular: denies: Chest Pain, Edema, Palpitations


Respiratory: reports: Cough, SOB on Exertion


Gastrointestinal: denies: Abdominal Pain, Diarrhea, Nausea


Genitourinary: denies: Burning, Discharge, Frequency


Musculoskeletal: denies: Back Pain, Muscle Pain


Integumentary: denies: Blister, Bruising


Neurological: denies: Change in LOC, Change in Speech, Confusion, Dizziness, 

Headache, Incoordination, Numbness, Syncope, Unsteady Gait, Weakness


Endocrine: denies: Excessive Sweating, Intolerance to Cold


Hematology/Lymphatic: denies: Easily Bruised, Excessive Bleeding


Psychiatric: denies: Altered Sleep Pattern, Anxiety





Physical Examination


 Vital Signs











Temperature  98.1 F   18 09:47


 


Pulse Rate  95 H  18 09:47


 


Respiratory Rate  18   18 09:47


 


Blood Pressure  111/53   18 09:47


 


O2 Sat by Pulse Oximetry (%)  95   18 21:00











 





Constitutional: Yes: Anxious (as wants to go home)


Eyes: Yes: Conjunctiva Clear, EOM Intact, PERRL


HENT: Yes: Normocephalic.  No: Epistaxis, Pharyngeal Erythema, Rhinnorhea


Neck: Yes: Trachea Midline.  No: Lymphadenopathy, Thyromegaly


Cardiovascular: Yes: Regular Rate and Rhythm, S1, S2


Respiratory: Yes: Regular.  No: Rhonchi, Wheezes


Gastrointestinal: Yes: Normal Bowel Sounds, Soft.  No: Tenderness


...Rectal Exam: Yes: Deferred


Renal/: No: CVA Tenderness - Left, CVA Tenderness - Right


Musculoskeletal: No: Back Pain, Joint Stiffness, Joint Swelling


Edema: No


Neurological: Yes: Alert, Oriented, Cran Nerves II-XII Intact, sensory intact, 

strength equal, no ataxia


Psychiatric: Yes: Alert, Oriented





 CBCD











WBC  14.3 K/mm3 (4.0-10.0)  H  18  06:15    


 


RBC  3.85 M/mm3 (4.00-5.60)  L  18  06:15    


 


Hgb  7.9 GM/dL (11.7-16.9)  L  18  06:15    


 


Hct  25.8 % (35.4-49)  L  18  06:15    


 


MCV  67.1 fl (80-96)  L  18  06:15    


 


MCHC  30.7 g/dl (32.0-35.9)  L  18  06:15    


 


RDW  18.2 % (11.9-15.9)  H  18  06:15    


 


Plt Count  311 K/MM3 (134-434)   18  06:15    


 


MPV  7.1 fl (7.5-11.1)  L  18  06:15    








 CMP











Sodium  137 mmol/L (136-145)   18  06:15    


 


Potassium  4.0 mmol/L (3.5-5.1)   18  06:15    


 


Chloride  101 mmol/L ()   18  06:15    


 


Carbon Dioxide  25 mmol/L (21-32)   18  06:15    


 


Anion Gap  11  (8-16)   18  06:15    


 


BUN  21 mg/dL (7-18)  H D 18  06:15    


 


Creatinine  0.6 mg/dL (0.7-1.3)  L  18  06:15    


 


Creat Clearance w eGFR  > 60  (>60)   18  20:00    


 


Calcium  8.1 mg/dL (8.5-10.1)  L  18  06:15    


 


Total Bilirubin  0.3 mg/dL (0.2-1.0)  D 18  20:00    


 


AST  18 U/L (15-37)   18  20:00    


 


ALT  38 U/L (12-78)   18  20:00    


 


Alkaline Phosphatase  96 U/L ()  D 18  20:00    


 


Total Protein  6.2 g/dl (6.4-8.2)  L  18  20:00    


 


Albumin  3.1 g/dl (3.4-5.0)  L  18  20:00    














Imaging





- Results


Chest X-ray: Report Reviewed


Cat Scan: Report Reviewed





Plan:


84 y/o M with HTN, COPD, Bladder CA, DM, who came to ER due to fall at home, 

unclear etiology and no focal deficits. He is currently without complaints and 

CT head completed and did not show any structural abnormalities. Is ambulating 

with and was without ataxia but spoke to nurse and he seemed unsteady earlier 

getting of bed. Admitted for further syncope workup, neurologically appears to 

be at baseline. Would check for orthostatics, cardiac workup. Recommended 

increased hydration. Monitor ambulating, consider physical therapy. May benefit 

from assistive device if therapist recommends this. Don't think he will need 

rehab but again will defer to therapist. Continue BP monitoring, maintain 

normotensive range. Tight glycemic control for DM. Fall precautions.

## 2018-03-02 NOTE — PN
Progress Note, Physician


History of Present Illness: 





Pt w/o fever, chills, SOB, [place], CP, palpitations, dizziness. Pt w/o N, V, 

diarrhea, abd pain.


Pt wants to go home states that is felling OK





- Current Medication List


Current Medications: 


Active Medications





Acetaminophen (Tylenol -)  650 mg PO Q6H PRN


   PRN Reason: PAIN LEVEL 4 - 6


Albuterol Sulfate (Ventolin 0.083% Nebulizer Soln -)  1 amp NEB Q6H PRN


   PRN Reason: WHEEZING


Albuterol/Ipratropium (Duoneb -)  1 amp NEB RQID [facility]


   Last Admin: 03/02/18 07:41 Dose:  1 amp


Aspirin (Ecotrin -)  81 mg PO DAILY [facility]


   Last Admin: 03/02/18 09:41 Dose:  81 mg


Diltiazem HCl (Cardizem Cd -)  120 mg PO DAILY [facility]


   Last Admin: 03/02/18 09:42 Dose:  120 mg


Insulin Aspart (Novolog Vial Sliding Scale -)  1 vial SQ BIDAC [facility]


   PRN Reason: Protocol


   Last Admin: 03/02/18 06:29 Dose:  4 units


Methylprednisolone Sodium Succinate (Solu-Medrol -)  40 mg IVPB Q6H-IV [facility]


   Last Admin: 03/02/18 09:41 Dose:  40 mg


Montelukast Sodium (Singulair -)  10 mg PO HS [facility]


   Last Admin: 03/01/18 21:22 Dose:  10 mg


Nicotine (Nicoderm Patch -)  21 mg TD DAILY [facility]


   Last Admin: 03/02/18 09:42 Dose:  21 mg


Nicotine Polacrilex (Nicorette Gum -)  4 mg BUC Q2H PRN


   PRN Reason: NICOTINE REPLACEMENT RX


   Last Admin: 03/01/18 22:30 Dose:  4 mg


Pantoprazole Sodium (Protonix -)  20 mg PO DAILY [facility]


   Last Admin: 03/02/18 09:41 Dose:  20 mg











- Objective


Vital Signs: 


 Vital Signs











Temperature  98.1 F   03/02/18 09:47


 


Pulse Rate  95 H  03/02/18 09:47


 


Respiratory Rate  18   03/02/18 09:47


 


Blood Pressure  111/53   03/02/18 09:47


 


O2 Sat by Pulse Oximetry (%)  95   03/01/18 21:00











Constitutional: Yes: Anxious


Cardiovascular: Yes: Regular Rate and Rhythm, S1, S2


Respiratory: Yes: Regular, Rhonchi


Gastrointestinal: Yes: Normal Bowel Sounds, Soft.  No: Tenderness


Edema: No


Neurological: Yes: Alert, Oriented, Other (symmetric motor and sensory exam in 

UE/ LE/ face)


Labs: 


 CBC, BMP





 03/02/18 06:15 





 03/02/18 06:15 











Problem List





- Problems


(1) Acute exacerbation of chronic obstructive pulmonary disease (COPD)


Code(s): J44.1 - CHRONIC OBSTRUCTIVE PULMONARY DISEASE W (ACUTE) EXACERBATION   





(2) Fall


Code(s): W19.XXXA - UNSPECIFIED FALL, INITIAL ENCOUNTER   





(3) DM2 (diabetes mellitus, type 2)


Code(s): E11.9 - TYPE 2 DIABETES MELLITUS WITHOUT COMPLICATIONS   





(4) HTN (hypertension)


Code(s): I10 - ESSENTIAL (PRIMARY) HYPERTENSION   





(5) Anxiety


Code(s): F41.9 - ANXIETY DISORDER, UNSPECIFIED   





Assessment/Plan





Unclear if pt hit his head; Head CT scan is negative for acute event.


IV Steroids-to be samuel per Pulmonary 


Stop Smoking


Trial of Xanax


Pulmonary, Neuro consult appreciated


AM labs
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
## 2018-03-02 NOTE — CONS
DATE OF CONSULTATION:

 

DATE OF DICTATION:  2018

 

CARDIOLOGY CONSULTATION 

 

REQUESTING PHYSICIAN:  Albino Javier M.D. 

 

HISTORY OF PRESENT ILLNESS:  An 85-year-old [place] gentleman who is unable to

provide a proper history on reviewing his hospital records and previous consultation.

 He has longstanding history of chronic obstructive pulmonary disease, chronic

bronchitis, chest pain syndrome compatible with coronary artery disease, angina

pectoris, history of memory loss compatible with dementia, history of tobacco abuse

and poor compliance.  

 

Patient according to the emergency note was admitted with a fall.  There is no known

history of loss of consciousness, seizures, or syncope.  

 

Patient is unable to provide a history.  On questioning, he denied having chest pain

or discomfort, either at rest or with exertion, no history of paroxysmal nocturnal

dyspnea or orthopnea reported, no palpitations, lightheadedness, dizziness reported.

 

PAST HISTORY:  

1.  As mentioned in the history of present illness.  

2.  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  

3.  History of prostatic and bladder carcinoma.  

4.  History of peptic ulcer disease.  

5.  History of cardiopulmonary arrest, 2015 while undergoing a urological

procedure which apparently may have been precipitated by Versed and Toradol and

required hospitalization. 

 

SURGICAL HISTORY:  

1.  Status post surgery for peptic ulcer disease.  

2.  See history of present illness.

3.  No other surgical history is available.  

 

FAMILY HISTORY:  Family history from office records, father  in his 80s.  Mother

 when he was 3 years old.  Causes of the demise is not known.  He has a brother

and a sister and 3 step brothers and sisters.  Two of the stepbrothers are ,

and his sister  in her 40s apparently related to lymphatic tumor.  

 

SOCIAL HISTORY:  He is retired .  Has 2 sons and 3 daughters.  One of

the sons  at the age of 35 apparently to organic brain syndrome.  Heavy smoker

since teenage and used to smoke 4-5 packs of cigarettes per day and according to the

previous history, he was smoking 2 packs of cigarettes recently.  Drinks 4-5 cups of

coffee, and does have a drink.  

 

ALLERGIES:  None recorded.

 

MEDICATION:

1.  Nicotine patch 21 mg daily.

2.  Nicorette gum 4 mg q.2 p.r.n. 

3.  Xanax 0.25 mg q.6 p.r.n.

4.  Albuterol via nebulizer 1 ampule q.6 p.r.n.

5.  DuoNeb 1 ampule q.i.d. 

6.  Diltiazem extended release 120 mg daily.

7.  Novolog insulin via sliding scale.

8.  Singulair 10 mg p.o. daily.

9.  Aspirin 81 mg p.o. daily.

10.  Protonix 20 mg p.o. daily.  

 

REVIEW OF SYSTEMS:  

Constitutional:  No history of chills, fever, or night sweats or weight loss has been

reported or documented. 

HEENT:  Unable to obtain any credible information.  

Cardiovascular:  See history of present illness.

Respiratory:  See history of present illness.  

Gastrointestinal:  History of peptic ulcer disease.  No history of nausea, vomiting,

melena reported.  

Central nervous system:  See history of present illness. 

Genitourinary:  See history of present illness.

 

PHYSICAL EXAMINATION:

General:  An 85-year-old gentleman was agitated, easily excitable, insisting on going

home.  No pallor, cyanosis, clubbing or jaundice noted.  

Vital signs:  Weight 122 pounds and 4.8 ounces, blood pressure 190/53 mmHg, pulse 88

beats per minute and regular, temperature 97.5 degrees Fahrenheit, respirations 20

per minute, oxygen saturation 94% on nasal cannula.  

Neck:  Supple.  No jugulovenous distention, carotids were equal and upstrokes were

normal.  No bruits were heard, and no thyromegaly was present.  

Heart:  PMI was in the 5th intercostal space, heart sounds were distant.  No murmur

or gallops were heard.  

Lungs:  Scattered crepitation involving the left lung anteriorly.  Right lung was

grossly clear.  There were decreased breath sounds.  

Abdomen:  Soft, nontender, no hepatosplenomegaly or palpable masses were felt.  There

was an epigastric surgical scar.  Bowel sounds were present.  No bruits were

appreciated.  

Extremities:  No calf tenderness or dependent edema, femoral pulses were 2+, right

dorsalis pedis pulse was 1+, left dorsalis pedis pulse was not palpable.  Posterior

tibial pulses were weak.  

 

LABORATORY DATA:  

1.  X-ray chest.  Impression:  Mild COPD, no acute pathology.  

2.  ECG 2018.  Sinus rhythm, right bundle branch block, cannot exclude

left posterior hemiblock, upward curving of ST segments in II, III, AVF, compatible

with early repolarization, possibility of _____ injury cannot be excluded.  There

were baseline artifacts recorded.  

3.  CBC, 2018.  WBC count 14,300.  Microcytic _____ disease.  Platelet count

311,000.  

4.  Chemistry on 2018.  Sodium 137, potassium 4.0, chloride 101, CO2 of 25

mmol/L, BUN 21, creatinine 0.6 mg/dL.  Random glucose 231 mg/dL.  Calcium 8.1 mg/dL. 

 

5.  Arterial blood gases 2018:  pH 7.34, pCO2 of 52, pO2 of 29. 

Bicarbonate 27.3.  

 

IMPRESSION:

1.  History of coronary artery disease, angina pectoris.

2.  Chronic obstructive pulmonary disease/chronic bronchitis.

3.  Hypertension.

4.  Hypercholesterolemia.  

5.  Tobacco abuse.  

6.  Right bundle branch block and probable left posterior hemiblock.

7.  Tobacco abuse.

 

RECOMMENDATION:

1.  Follow up ECG and troponin levels.

2.  Continue current antihypertensive and antianginal medications.  

3.  Tried to contact family members, but there was no response.  

4.  Cessation of smoking remains of paramount importance.

5.  Neurological evaluation for possible dementia.

 

Prognosis guarded.  

 

Thank you for your referral.

 

Yours sincerely,

 

 

GABRIEL JHAVERI M.D.

 

[id number]

DD: 2018 12:39

DT: 2018 16:41

Job #:  [id number]

## 2018-03-02 NOTE — PN
Progress Note (short form)





- Note


Progress Note: 





PULMONARY





VSS/AFEBRILE


AMBULATING IN HALLWAY


OFFERS NO COMPLAINTS


WANTS TO GO HOME





ANICTERIC


CHEST DISTANT /CLEAR


S1S2


BS+


NO EDEMA


NO FOCAL NEURO FINDINGS





LABS/MEDS/NOTES/IMAGES/MEDS REVIEWED








s/p Fall


 COPD /Emphysema


Lung Mass stable via ct chest


HTN


DM


Heavy Smoker





-  IV medrol discontinued


-  inhaled bronchodilators standing and PRN


-  O2 to keep SpO2 >90%


-  would obtain outpt PET scan if not done already


-  encourage smoking cessation


-  DVT prophylaxis


-  Discharge planning for today





EMI WILLIAM MD

## 2018-08-14 ENCOUNTER — HOSPITAL ENCOUNTER (INPATIENT)
Dept: HOSPITAL 74 - JER | Age: 83
LOS: 3 days | Discharge: LEFT BEFORE DISCHARGE | DRG: 378 | End: 2018-08-17
Attending: SPECIALIST | Admitting: SPECIALIST
Payer: COMMERCIAL

## 2018-08-14 VITALS — BODY MASS INDEX: 20.8 KG/M2

## 2018-08-14 DIAGNOSIS — D50.0: ICD-10-CM

## 2018-08-14 DIAGNOSIS — E11.9: ICD-10-CM

## 2018-08-14 DIAGNOSIS — K92.2: Primary | ICD-10-CM

## 2018-08-14 DIAGNOSIS — J44.1: ICD-10-CM

## 2018-08-14 DIAGNOSIS — I25.10: ICD-10-CM

## 2018-08-14 DIAGNOSIS — F17.210: ICD-10-CM

## 2018-08-14 DIAGNOSIS — I10: ICD-10-CM

## 2018-08-14 LAB
ALBUMIN SERPL-MCNC: 3.2 G/DL (ref 3.4–5)
ALP SERPL-CCNC: 101 U/L (ref 45–117)
ALT SERPL-CCNC: 14 U/L (ref 12–78)
ANION GAP SERPL CALC-SCNC: 8 MMOL/L (ref 8–16)
ANISOCYTOSIS BLD QL: (no result)
AST SERPL-CCNC: 8 U/L (ref 15–37)
BILIRUB SERPL-MCNC: 0.3 MG/DL (ref 0.2–1)
BUN SERPL-MCNC: 18 MG/DL (ref 7–18)
CALCIUM SERPL-MCNC: 8.1 MG/DL (ref 8.5–10.1)
CHLORIDE SERPL-SCNC: 105 MMOL/L (ref 98–107)
CO2 SERPL-SCNC: 29 MMOL/L (ref 21–32)
CREAT SERPL-MCNC: 0.6 MG/DL (ref 0.7–1.3)
DEPRECATED RDW RBC AUTO: 24 % (ref 11.9–15.9)
GLUCOSE SERPL-MCNC: 93 MG/DL (ref 74–106)
HCT VFR BLD CALC: 19.3 % (ref 35.4–49)
HGB BLD-MCNC: 5.4 GM/DL (ref 11.7–16.9)
INR BLD: 1.12 (ref 0.83–1.09)
MACROCYTES BLD QL: (no result)
MCH RBC QN AUTO: 15.4 PG (ref 25.7–33.7)
MCHC RBC AUTO-ENTMCNC: 28.1 G/DL (ref 32–35.9)
MCV RBC: 54.9 FL (ref 80–96)
OVALOCYTES BLD QL SMEAR: (no result)
PLATELET # BLD AUTO: 805 K/MM3 (ref 134–434)
PLATELET BLD QL SMEAR: (no result)
PMV BLD: 8.4 FL (ref 7.5–11.1)
POTASSIUM SERPLBLD-SCNC: 4.2 MMOL/L (ref 3.5–5.1)
PROT SERPL-MCNC: 6.4 G/DL (ref 6.4–8.2)
PT PNL PPP: 12.7 SEC (ref 9.7–13)
RBC # BLD AUTO: 3.51 M/MM3 (ref 4–5.6)
RBC MORPH BLD: YES
SODIUM SERPL-SCNC: 142 MMOL/L (ref 136–145)
WBC # BLD AUTO: 10 K/MM3 (ref 4–10)

## 2018-08-14 PROCEDURE — 30233N1 TRANSFUSION OF NONAUTOLOGOUS RED BLOOD CELLS INTO PERIPHERAL VEIN, PERCUTANEOUS APPROACH: ICD-10-PCS | Performed by: SPECIALIST

## 2018-08-14 PROCEDURE — P9038 RBC IRRADIATED: HCPCS

## 2018-08-14 PROCEDURE — P9058 RBC, L/R, CMV-NEG, IRRAD: HCPCS

## 2018-08-14 NOTE — PDOC
Rapid Medical Evaluation


Chief Complaint: Blood Transfusion


Time Seen by Provider: 08/14/18 15:30


Medical Evaluation: 


 Allergies











Allergy/AdvReac Type Severity Reaction Status Date / Time


 


No Known Allergies Allergy   Verified 02/13/18 08:44











08/14/18 15:31


I have performed a brief in-person evaluation of this patient. 


The patient presents with a chief complaint of: sent by dR james ta blood 

transfusion


Pertinent physical exam findings: Pale, Quiet 


I have ordered the following: CBC, CMP type and Screen


The patient will proceed to the ED for further evaluation.

## 2018-08-14 NOTE — PDOC
History of Present Illness





- General


Chief Complaint: Blood Transfusion


Stated Complaint: BLOOD TRANSFUSION


Time Seen by Provider: 08/14/18 15:30





- History of Present Illness


Initial Comments: 





08/14/18 17:53


The patient is a 85 year old male, with a significant past medical history of 

COPD (not on home O2), HTN, DM, asthma, bladder CA, heavy tobacco use, and a 

history of respiratory arrest, who presents to the emergency department with 

lightheadedness today and a reported hemoglobin of 7.4 as per Dr. Javier. He 

denies bleeding or dark, bloody stools. 





The patient denies chest pain, shortness of breath, headache. The patient 

denies fever, chills, nausea, vomit, diarrhea and constipation.The patient 

denies dysuria, frequency, urgency and hematuria. 





Allergies: NKDA


Past surgical history: abdominal Sx for peptic ulcer


PCP - Dr. Javier


Pulmonologist: Dr. Cade








Past History





- Past Medical History


Allergies/Adverse Reactions: 


 Allergies











Allergy/AdvReac Type Severity Reaction Status Date / Time


 


No Known Allergies Allergy   Verified 08/14/18 15:33











Home Medications: 


Ambulatory Orders





Montelukast Sodium [Singulair] 10 mg PO DAILY 08/14/18 








Asthma: Yes


Cancer: No


COPD: Yes (EMPHYSEMA; RESPIRATORY ARREST S/P CYSTO)


Diabetes: No (LOW BL.SUGAR)





- Surgical History


Abdominal Surgery: Yes (STOMACH ULCER)





- Immunization History


Immunization Up to Date: Yes





- Suicide/Smoking/Psychosocial Hx


Smoking History: Current some day smoker


Have you smoked in the past 12 months: Yes


Number of Cigarettes Smoked Daily: 70


Information on smoking cessation initiated: No


'Breaking Loose' booklet given: 03/01/18


Hx Alcohol Use: No


Drug/Substance Use Hx: No


Substance Use Type: None


Hx Substance Use Treatment: No





**Review of Systems





- Review of Systems


Comments:: 





08/14/18 17:54


GENERAL/CONSTITUTIONAL: No fever or chills. No weakness. +lightheadedness


HEAD, EYES, EARS, NOSE AND THROAT: No change in vision. No ear pain or 

discharge. No sore throat.


GASTROINTESTINAL: No nausea, vomiting, diarrhea or constipation.


GENITOURINARY: No dysuria, frequency, or change in urination.


CARDIOVASCULAR: No chest pain or shortness of breath.


RESPIRATORY: No cough, wheezing, or hemoptysis.


MUSCULOSKELETAL: No joint or muscle swelling or pain. No neck or back pain.


SKIN: No rash


NEUROLOGIC: No headache, vertigo, loss of consciousness, or change in strength/

sensation.


ENDOCRINE: No increased thirst. No abnormal weight change.


HEMATOLOGIC/LYMPHATIC: No anemia, easy bleeding, or history of blood clots.


ALLERGIC/IMMUNOLOGIC: No hives or skin allergy.





*Physical Exam





- Vital Signs


 Last Vital Signs











Temp Pulse Resp BP Pulse Ox


 


 98.7 F   99 H  20   107/50   98 


 


 08/14/18 15:28  08/14/18 15:28  08/14/18 15:28  08/14/18 15:28  08/14/18 15:28














- Physical Exam


Comments: 





08/14/18 17:54


GENERAL: Awake, alert, and fully oriented, in no acute distress. Pale.


HEAD: No signs of trauma


EYES: PERRLA, EOMI, sclera anicteric, conjunctiva clear


ENT: Auricles normal inspection, hearing grossly normal, nares patent, 

oropharynx clear without exudates. Moist mucosa


NECK: Normal ROM, supple, no lymphadenopathy, JVD, or masses


LUNGS: Breath sounds equal, clear to auscultation bilaterally. No wheezes, and 

no crackles


HEART: Regular rate and rhythm, normal S1 and S2, no murmurs, rubs or gallops


ABDOMEN: Soft, nontender, normoactive bowel sounds. No guarding, no rebound. No 

masses


RECTAL: brown stool in vault, no bleeding


EXTREMITIES: Normal range of motion, no edema. No clubbing or cyanosis. No cords

, erythema, or tenderness


BACK: No midline spinal tenderness in cervical/thoracic/lumbar region


NEUROLOGICAL: Normal speech, cranial nerves intact, negative pronator drift, 5/

5 strength in all 4 extremities, normal sensation to light touch in all 4 

extremities, normal cerebellar exam, normal gait, normal reflexes and tone


SKIN:  Warm, Dry, normal turgor, no rashes or lesions noted."





ED Treatment Course





- LABORATORY


CBC & Chemistry Diagram: 


 08/14/18 15:58





 08/14/18 15:58





- ADDITIONAL ORDERS


Additional order review: 


 Laboratory  Results











  08/14/18 08/14/18





  15:58 15:58


 


PT with INR  12.70 


 


INR  1.12 H 


 


Crossmatch   See Detail








 











  08/14/18





  15:58


 


RBC  3.51 L


 


MCV  54.9 L


 


MCHC  28.1 L


 


RDW  24.0 H


 


MPV  8.4  D


 


Neutrophils %  No Result Required.


 


Lymphocytes %  No Result Required.














Medical Decision Making





- Medical Decision Making





08/14/18 17:25


84yo M hx CAD, COPD presents to the ED with anemia on outpt labs. Pt reports 

lightheadedness. Vitals with borderline tachycardia (99) and borderline 

hypotension (100s systolic). Stool occult pending, brown stool. Hgb here 5.4. 

Etiology of anemia unclear. Will transfuse and admit.





08/14/18 18:04


Case discussed with Dr ELIZABETH Javier, who accepts pt for admission





Case discussed in detail with admitting physician including history, physical 

exam and ancillary studies.





Admitting physician has assumed care for the patient, will follow all pending 

diagnostics and will complete the evaluation and treatment.  





*DC/Admit/Observation/Transfer


Diagnosis at time of Disposition: 


 Anemia








- Discharge Dispostion


Condition at time of disposition: Stable


Decision to Admit order: Yes





- Referrals





- Patient Instructions





- Post Discharge Activity





- Attestations


Physician Attestion: 





08/14/18 18:09








I, Dr. Adryan Crocker MD, attest that this document has been prepared under my 

direction and personally reviewed by me in its entirety.   I further attest, 

that it accurately reflects all work, treatment, procedures and medical decision

-making performed by me.

## 2018-08-14 NOTE — HP
Admitting History and Physical





- Primary Care Physician


PCP: Lucero Javier S





- Admission


Chief Complaint: anemia, weakness


History of Present Illness: 





The patient is a 85 year old male, with a significant past medical history of 

COPD (not on home O2), HTN, DM, asthma, bladder CA, heavy tobacco use, and a 

history of respiratory arrest, who presents to the emergency department with 

lightheadedness today and a reported hemoglobin of 7.4 per  dr records. He 

denies bleeding or dark, bloody stools.


Has chronic dry cough, some SOB and [place], chronic R sided CP with dry cough; 

seen by heme onc and pulm in the past. 


The patient denies dizziness or headache. The patient denies fever, chills, 

nausea, vomit, diarrhea and constipation.The patient denies dysuria, frequency, 

urgency and hematuria. 


no falls or trauma; wife at bedside 


per wife pt still smokes a lot and is not willing to stop; advised in the past 

to stop smoking but pt not willing to.


History Source: Patient, Family Member, Medical Record


Limitations to Obtaining History: No Limitations





- Past Medical History


Cardiovascular: Yes: HTN, Other (ASHD)


Pulmonary: Yes: COPD, Other (current smoker)


Renal/: Yes: Cancer (bladder cancer- transitional cell), Other (PAST PROSTATE 

CA)


Endocrine: Yes: Diabetes Mellitus





- Smoking History


Smoking history: Current some day smoker


Have you smoked in the past 12 months: Yes


Aproximately how many cigarettes per day: 70





- Alcohol/Substance Use


Hx Alcohol Use: No


History of Substance Use: reports: None





- Social History


Usual Living Arrangement: Yes: With Spouse


ADL: Independent


Occupation: Retired machinest?


History of Recent Travel: No





Home Medications





- Allergies


Allergies/Adverse Reactions: 


 Allergies











Allergy/AdvReac Type Severity Reaction Status Date / Time


 


No Known Allergies Allergy   Verified 18 15:33














- Home Medications


Home Medications: 


Ambulatory Orders





Montelukast Sodium [Singulair] 10 mg PO DAILY 18 











Family Disease History





- Family Disease History


Family Disease History: Other: Father (: unclear causes), Mother (: 

unclear causes), Brother (1, healthy), Sister (1, healthy), Son (3, 1  age 

30 unclear causes), Daughter (2, 1  age 35 of unclear cancer)





Review of Systems





- Review of Systems


Constitutional: reports: Loss of Appetite, Unintentional Wgt. Loss.  denies: 

Chills, Fever


Eyes: denies: Blind Spots, Blurred Vision, Double Vision


HENT: denies: Ear Pain, Epistaxis


Neck: denies: Stiffness, Tenderness


Cardiovascular: reports: Chest Pain (R sided), Shortness of Breath.  denies: 

Palpitations


Respiratory: reports: Cough, SOB, SOB on Exertion.  denies: Hemoptysis, 

Orthopnea, PND, Wheezing


Gastrointestinal: denies: Abdominal Pain, Constipation, Diarrhea, Vomiting


Genitourinary: denies: Dysuria, Flank Pain


Musculoskeletal: denies: Back Pain, Joint Swelling


Integumentary: denies: Rash, Wound


Neurological: denies: Change in LOC, Change in Speech, Confusion, Dizziness, 

Seizure, Syncope, Tremors, Unsteady Gait, Weakness


Hematology/Lymphatic: denies: Easily Bruised, Excessive Bleeding


Psychiatric: denies: Anxiety, Depression





Physical Examination


Vital Signs: 


 Vital Signs











Temperature  98.8 F   18 19:10


 


Pulse Rate  91 H  18 19:10


 


Respiratory Rate  20   18 15:28


 


Blood Pressure  114/56   18 19:10


 


O2 Sat by Pulse Oximetry (%)  100   18 19:10











Constitutional: Yes: No Distress, Calm


Eyes: Yes: Conjunctiva Clear


HENT: Yes: Atraumatic


Neck: Yes: Supple


Cardiovascular: Yes: Regular Rate and Rhythm


Respiratory: Yes: Rales (R>L)


Gastrointestinal: Yes: Soft.  No: Tenderness


Renal/: No: CVA Tenderness - Left, CVA Tenderness - Right, Hematuria


Musculoskeletal: No: Joint Stiffness, Joint Swelling


Extremities: No: Cold, Cool, Cyanosis


Edema: No


Integumentary: No: Rash, Venous Stasis Changes


Neurological: Yes: WNL, Alert, Oriented


...Motor Strength: WNL


Psychiatric: Yes: WNL, Alert, Oriented.  No: Agitated, Suicidal Ideation


Labs: 


 CBC, BMP





 18 15:58 





 18 15:58 











Imaging





- Results


Chest X-ray: Report Reviewed


Other: Report Reviewed





Assessment/Plan





The patient is a 85 year old male, with a significant past medical history of 

COPD (not on home O2), HTN, DM, asthma, bladder CA, heavy tobacco use, and a 

history of respiratory arrest,  who presents to the emergency department with 

lightheadedness today and severe anemia


admit; transfuse


heme onc eval


f/u labs


pulm eval


falls PFX


stop tobacco 


d/w pt and staff; d/w wife at bedside


prognosis guarded; pt and wife understand his condition, plan of tx and 

prognosis

## 2018-08-15 LAB
ALBUMIN SERPL-MCNC: 2.9 G/DL (ref 3.4–5)
ALP SERPL-CCNC: 91 U/L (ref 45–117)
ALT SERPL-CCNC: 14 U/L (ref 12–78)
ANION GAP SERPL CALC-SCNC: 6 MMOL/L (ref 8–16)
AST SERPL-CCNC: 12 U/L (ref 15–37)
BILIRUB SERPL-MCNC: 0.9 MG/DL (ref 0.2–1)
BUN SERPL-MCNC: 17 MG/DL (ref 7–18)
CALCIUM SERPL-MCNC: 7.7 MG/DL (ref 8.5–10.1)
CHLORIDE SERPL-SCNC: 106 MMOL/L (ref 98–107)
CO2 SERPL-SCNC: 29 MMOL/L (ref 21–32)
CREAT SERPL-MCNC: 0.5 MG/DL (ref 0.7–1.3)
DEPRECATED RDW RBC AUTO: 31.2 % (ref 11.9–15.9)
GLUCOSE SERPL-MCNC: 79 MG/DL (ref 74–106)
HCT VFR BLD CALC: 24.6 % (ref 35.4–49)
HGB BLD-MCNC: 7.5 GM/DL (ref 11.7–16.9)
MCH RBC QN AUTO: 19 PG (ref 25.7–33.7)
MCHC RBC AUTO-ENTMCNC: 30.7 G/DL (ref 32–35.9)
MCV RBC: 62 FL (ref 80–96)
PLATELET # BLD AUTO: 591 K/MM3 (ref 134–434)
PMV BLD: 8.1 FL (ref 7.5–11.1)
POTASSIUM SERPLBLD-SCNC: 4.3 MMOL/L (ref 3.5–5.1)
PROT SERPL-MCNC: 5.7 G/DL (ref 6.4–8.2)
RBC # BLD AUTO: 3.96 M/MM3 (ref 4–5.6)
RETICS # AUTO: 1.72 % (ref 0.5–1.5)
SODIUM SERPL-SCNC: 141 MMOL/L (ref 136–145)
WBC # BLD AUTO: 9.8 K/MM3 (ref 4–10)

## 2018-08-15 RX ADMIN — BUDESONIDE AND FORMOTEROL FUMARATE DIHYDRATE SCH PUFF: 160; 4.5 AEROSOL RESPIRATORY (INHALATION) at 09:19

## 2018-08-15 RX ADMIN — PANTOPRAZOLE SODIUM SCH MG: 40 TABLET, DELAYED RELEASE ORAL at 21:06

## 2018-08-15 RX ADMIN — METHYLPREDNISOLONE SODIUM SUCCINATE SCH MG: 40 INJECTION, POWDER, FOR SOLUTION INTRAMUSCULAR; INTRAVENOUS at 16:00

## 2018-08-15 RX ADMIN — MONTELUKAST SODIUM SCH MG: 10 TABLET, COATED ORAL at 09:06

## 2018-08-15 RX ADMIN — BUDESONIDE AND FORMOTEROL FUMARATE DIHYDRATE SCH PUFF: 160; 4.5 AEROSOL RESPIRATORY (INHALATION) at 21:05

## 2018-08-15 RX ADMIN — POLYETHYLENE GLYCOL 3350 SCH GRAMS: 17 POWDER, FOR SOLUTION ORAL at 15:00

## 2018-08-15 RX ADMIN — ALBUTEROL SULFATE PRN AMP: 2.5 SOLUTION RESPIRATORY (INHALATION) at 19:37

## 2018-08-15 RX ADMIN — METHYLPREDNISOLONE SODIUM SUCCINATE SCH MG: 40 INJECTION, POWDER, FOR SOLUTION INTRAMUSCULAR; INTRAVENOUS at 21:05

## 2018-08-15 RX ADMIN — POLYETHYLENE GLYCOL 3350 SCH GRAMS: 17 POWDER, FOR SOLUTION ORAL at 21:08

## 2018-08-15 RX ADMIN — ALBUTEROL SULFATE PRN AMP: 2.5 SOLUTION RESPIRATORY (INHALATION) at 12:35

## 2018-08-15 RX ADMIN — NICOTINE SCH: 21 PATCH TRANSDERMAL at 11:00

## 2018-08-15 NOTE — PN
Progress Note, Physician


History of Present Illness: 





Pt w/o left side or SSCP, palpitations, dizziness, change in cough, change in 

breathing pattern.


Pt is c/o right side CP with breathing. 





- Current Medication List


Current Medications: 


Active Medications





Albuterol Sulfate (Ventolin 0.083% Nebulizer Soln -)  1 amp NEB Q6H PRN


   PRN Reason: SHORT OF BREATH/WHEEZING


Budesonide/Formoterol Fumarate (Symbicort 160/4.5mcg -)  2 puff IH BID [facility]


   Last Admin: 08/15/18 09:19 Dose:  2 puff


Montelukast Sodium (Singulair -)  10 mg PO DAILY [facility]


   Last Admin: 08/15/18 09:06 Dose:  10 mg











- Objective


Vital Signs: 


 Vital Signs











Temperature  97.7 F   08/15/18 06:00


 


Pulse Rate  91 H  08/15/18 06:00


 


Respiratory Rate  18   08/15/18 06:00


 


Blood Pressure  109/50   08/15/18 06:00


 


O2 Sat by Pulse Oximetry (%)  98   08/15/18 02:35











Constitutional: Yes: No Distress, Calm


Cardiovascular: Yes: Regular Rate and Rhythm, S1, S2


Respiratory: Yes: Regular, Rhonchi (scattered), Wheezes (scattered)


Gastrointestinal: Yes: Normal Bowel Sounds, Soft.  No: Tenderness


Edema: No


Neurological: Yes: Alert, Oriented


Labs: 


 CBC, BMP





 08/15/18 06:00 





 08/15/18 06:00 





 INR, PTT











INR  1.12  (0.83-1.09)  H  08/14/18  15:58    














Problem List





- Problems


(1) Anemia


Code(s): D64.9 - ANEMIA, UNSPECIFIED   





(2) COPD (chronic obstructive pulmonary disease)


Code(s): J44.9 - CHRONIC OBSTRUCTIVE PULMONARY DISEASE, UNSPECIFIED   





(3) CAD (coronary artery disease)


Code(s): I25.10 - ATHSCL HEART DISEASE OF NATIVE CORONARY ARTERY W/O ANG PCTRS 

  





(4) Diabetes mellitus


Code(s): E11.9 - TYPE 2 DIABETES MELLITUS WITHOUT COMPLICATIONS   





(5) PUD (peptic ulcer disease)


Assessment/Plan: 


Hx/o gastric ulcer in 1970's


Code(s): K27.9 - PEPTIC ULC, SITE UNSP, UNSP AS AC OR CHR, W/O HEMOR OR PERF   





Assessment/Plan





s/p 2 units PRBC TX


to tranfuse one more unit PRBC for a Hb above 8 ( Hx/o CAD, advanced COPD)


Heme/ Onco consult


GI consult. 


AM labs


OOBTC

## 2018-08-15 NOTE — CONSULT
Consult


Consult Specialty:: hematology-oncology





- History of Present Illness


Chief Complaint: trouble breathing, anemia


History of Present Illness: 


The patient is a 85 year old male, with a significant past medical history of 

COPD, HTN, DM, asthma, bladder CA, heavy tobacco use, and a history of 

respiratory arrest, who presents to the emergency department with 

lightheadedness, trouble breathing. Patient states that he was always have 

trouble breathing but it has gotten worse. He also reports loss of weight about 

100 pounds and loss of appetite reported He denies bleeding or dark, bloody 

stools. Denies dysphagia, hematuria, change in frequency of urination, burning. 

Denies constipation, diarrhoea and distension. 























- History Source


History Provided By: Patient, Medical Record


Limitations to Obtaining History: No Limitations





- Past Medical History


Cardio/Vascular: Yes: HTN, Hyperlipdemia, Other (severe peripheral vascular 

disease by CT, suspect ASHD)


Pulmonary: Yes: COPD, Pneumonia (), Other (current smoker, home oxygen 

dependent)


Gastrointestinal: Yes: Constipation, Peptic Ulcer Disease (with bleeding 

leading to partial gastrectomy)


Renal/: Yes: Cancer (bladder cancer- transitional cell, prostate cancer per 

the chart- William denies this), Renal Calculi, Other (PAST PROSTATE CA)


Endocrine: Yes: Diabetes Mellitus





- Past Surgical History


Additional Surgical History: s/p partial gastrectomy for bleeding ulcer





- Alcohol/Substance Use


Hx Alcohol Use: Yes (quit after ulcer surgery)


History of Substance Use: reports: None





- Smoking History


Smoking history: Current every day smoker


Have you smoked in the past 12 months: Yes


Aproximately how many cigarettes per day: 70 (2 PPD)





- Social History


Usual Living Arrangement: With Spouse


ADL: Independent


Occupation: Retired 


History of Recent Travel: No





Home Medications





- Allergies


Allergies/Adverse Reactions: 


 Allergies











Allergy/AdvReac Type Severity Reaction Status Date / Time


 


No Known Allergies Allergy   Verified 18 15:33














- Home Medications


Home Medications: 


Ambulatory Orders





Montelukast Sodium [Singulair] 10 mg PO DAILY 18 











Family Disease History





- Family Disease History


Family Disease History: Other: Father ( in his 90's unclear causes), Mother 

( young unclear causes), Brother (1, healthy), Sister (1, healthy), Son (3, 

1  age 30 unclear causes), Daughter (2, 1  age 35 of unclear cancer)





Review of Systems





- Review of Systems


Constitutional: reports: Lethargy, Loss of Appetite, Weakness


Eyes: reports: No Symptoms.  denies: Blind Spots, Blurred Vision


HENT: denies: Difficult Swallowing


Cardiovascular: reports: Shortness of Breath.  denies: Chest Pain, Palpitations


Respiratory: reports: SOB, SOB on Exertion, Wheezing.  denies: Hemoptysis


Gastrointestinal: reports: Constipation.  denies: Abdominal Pain, Bloating, 

Diarrhea, Dysphagia, Melena


Genitourinary: reports: No Symptoms.  denies: Discharge, Flank Pain, Frequency


Musculoskeletal: reports: No Symptoms


Neurological: reports: No Symptoms


Hematology/Lymphatic: reports: No Symptoms


Psychiatric: reports: No Symptoms





Physical Exam


Vital Signs: 


 Vital Signs











Temperature  98.6 F   08/15/18 09:00


 


Pulse Rate  88   08/15/18 09:00


 


Respiratory Rate  18   08/15/18 09:00


 


Blood Pressure  110/56   08/15/18 09:00


 


O2 Sat by Pulse Oximetry (%)  97   08/15/18 09:00











Constitutional: Yes: Well Nourished, Calm, Mild Distress


Eyes: Yes: WNL, Conjunctiva Clear, EOM Intact


HENT: Yes: WNL, Atraumatic, Normocephalic.  No: Thrush


Neck: Yes: WNL, Supple, Trachea Midline.  No: Lymphadenopathy


Cardiovascular: Yes: WNL, Regular Rate and Rhythm, Tachycardia, Murmur


Respiratory: Yes: WNL, Regular, Wheezes (decrease air entry b/l)


Gastrointestinal: Yes: WNL, Normal Bowel Sounds, Soft, Other (midline scar).  No

: Splenomegaly, Tenderness


Renal/: Yes: Other (no testicular swelling, circumcised).  No: CVA Tenderness 

- Left, CVA Tenderness - Right, Incontinence, Scrotal Edema


Musculoskeletal: No: Back Pain


Peripheral Pulses WNL: No


Neurological: Yes: WNL, Alert, Oriented


Psychiatric: Yes: WNL, Alert, Oriented


Labs: 


 CBC, BMP





 08/15/18 06:00 





 08/15/18 06:00 











Problem List





- Problems


(1) Anemia


Code(s): D64.9 - ANEMIA, UNSPECIFIED   





(2) COPD (chronic obstructive pulmonary disease)


Code(s): J44.9 - CHRONIC OBSTRUCTIVE PULMONARY DISEASE, UNSPECIFIED   





(3) PUD (peptic ulcer disease)


Code(s): K27.9 - PEPTIC ULC, SITE UNSP, UNSP AS AC OR CHR, W/O HEMOR OR PERF   





(4) Acute exacerbation of chronic obstructive pulmonary disease (COPD)


Code(s): J44.1 - CHRONIC OBSTRUCTIVE PULMONARY DISEASE W (ACUTE) EXACERBATION   





Assessment/Plan





For anemia, weight loss and loss of appetite patient need GI workup. 


Patient is scheduled for colonoscopy on . 


Iron studies and folic acid pending. 


B-12 elevated --?? etiology 





Visit type





- Emergency Visit


Emergency Visit: Yes


ED Registration Date: 18


Care time: The patient presented to the Emergency Department on the above date 

and was hospitalized for further evaluation of their emergent condition.





- New Patient


This patient is new to me today: Yes


Date on this admission: 18





- Critical Care


Critical Care patient: No

## 2018-08-15 NOTE — PN
Progress Note (short form)





- Note


Progress Note: 





PULMONARY





CONSULTATION DICTATED 8/15/18





IMP DYSPNEA/LIGHTHEADEDNESS


      SEVERE SYMPTOMATIC ANEMIA LIKELY GI BLEED


      COPD EXACERBATION


      RUL MASS STABLE SINCE 2016


      DM


      HTN


      ASHD


      PVD


      TOBACCO ABUSE


      


PLAN IV STEROIDS


       INHALED BRONCHODILATORS


       O2


       NORMAL TRANSFUSION THRESHOLD


       MONITOR H+H


       CHEST X-RAY


       SMOKING CESSATION COUNSELED





DR NAVA








Problem List





- Problems


(1) Anemia


Code(s): D64.9 - ANEMIA, UNSPECIFIED   





(2) CAD (coronary artery disease)


Code(s): I25.10 - ATHSCL HEART DISEASE OF NATIVE CORONARY ARTERY W/O ANG PCTRS 

  





(3) COPD (chronic obstructive pulmonary disease)


Code(s): J44.9 - CHRONIC OBSTRUCTIVE PULMONARY DISEASE, UNSPECIFIED   





(4) Diabetes mellitus


Code(s): E11.9 - TYPE 2 DIABETES MELLITUS WITHOUT COMPLICATIONS   





(5) Acute exacerbation of chronic obstructive pulmonary disease (COPD)


Code(s): J44.1 - CHRONIC OBSTRUCTIVE PULMONARY DISEASE W (ACUTE) EXACERBATION   





(6) DM2 (diabetes mellitus, type 2)


Code(s): E11.9 - TYPE 2 DIABETES MELLITUS WITHOUT COMPLICATIONS   





(7) Emphysema of lung


Code(s): J43.9 - EMPHYSEMA, UNSPECIFIED   





(8) HTN (hypertension)


Code(s): I10 - ESSENTIAL (PRIMARY) HYPERTENSION

## 2018-08-15 NOTE — CONS
DATE OF CONSULTATION:  08/15/2018 

 

REFERRING PHYSICIAN:  Lucero Javier MD

 

The patient is an 85-year-old white male with past history of advanced COPD;

longstanding history of tobacco use, currently still smoking; hypertension, diabetes,

bladder cancer, pneumonia, history of respiratory arrest, hyperlipidemia, peripheral

vascular disease, renal calculi, transitional cell carcinoma, prostate carcinoma,

admitted to [facility] with complaint of increasing shortness of

breath and lightheadedness.  Patient states he has always had trouble breathing, but

it recently got worsened.  He also complained of 100-pound weight loss over the past

year.  He denied any bleeding or dark stools.  Apparently on admission he was noticed

to be markedly anemic with a hemoglobin of 5.4 g.  He was subsequently transfused. 

He was _____ by Dr. Costa for GI as the patient is scheduled to have a colonoscopy

as well as an endoscopy on Friday.  Patient has been complaining of shortness of

breath with exertion.  He denies any chest pain, nausea, vomiting, or diaphoresis. 

Denies any hemoptysis.  He denies any cough.  He states he uses an inhaler at home

but is unsure of the name.  

 

PAST MEDICAL HISTORY:  As above.  Right upper lobe mass which is stable since 2016.  

 

CURRENT MEDICATIONS:  Include Symbicort, Nicoderm, albuterol, Dulcolax, MiraLax,

Singulair, and Protonix.  

 

REVIEW OF SYSTEMS:  Positive shortness of breath, positive cough, positive chest

congestion.  No fever, no chills, no abdominal pain.  Positive lightheadedness.  No

lower extremity edema.  

 

SOCIAL HISTORY:  Born in [place].  Moved to the [place] many years ago. 

Previously worked with [employer], positive asbestos exposure.  History of tobacco since

early teens.  

 

PHYSICAL EXAMINATION:  

General:  The patient is a well-developed, thin male, awake, alert, in no acute

respiratory distress.  

Vital Signs:  He is currently afebrile.  Blood pressure is 110/56.  Respiratory rate

is 18.  O2 saturation is 97% on 2 L.

HEENT:  Exam is normocephalic, atraumatic.  

Neck:  Supple.

Heart:  Regular, S1, S2.  

Chest:  Diffuse bilateral expiratory and inspiratory wheezes.

Abdomen:  Soft.  Bowel sounds are positive.  

Extremities:  No cyanosis or edema. 

 

LABORATORY:  WBC is initially 10, hemoglobin 5.4, hematocrit 19.3, with a platelet

count of 805.  Repeat WBC is 9.8, hemoglobin 7.5, hematocrit is 24.6, with a platelet

count of 591,000.  INR is 1.12.  Blood gas not performed.  BUN is 17.  Creatinine

0.5.  Chest x-ray not performed.

 

IMPRESSION:  Dyspnea, lightheadedness, looks like secondary to multiple factors:

1.  Severe symptomatic anemia likely secondary to gastrointestinal bleed.

2.  Chronic obstructive pulmonary disease with acute exacerbation.

3.  Hypertension.

4.  Hyperlipidemia.  

5.  Diabetes.

6.  Right upper lobe mass, stable since 2016.

7.  Tobacco abuse.  

 

PLAN:  IV steroids, inhaled bronchodilators, supplemental O2, monitor hemoglobin and

hematocrit, normal transfusion threshold.  GI workup as per Dr. Costa.  Smoking

cessation counseled.

 

 

MISBAH NAVA M.D.

 

NAMRATA/[id number]

DD: 08/15/2018 13:37

DT: 08/15/2018 14:20

Job #:  [id number]

## 2018-08-15 NOTE — CON.GI
Consult


Consult Specialty:: Gastroenterology


Referred by:: Dr Javier


Reason for Consultation:: Anemia





- History of Present Illness


Chief Complaint: Weakness and shortness of breath


History of Present Illness: 





85M is admiited with a Hb of 5.4 with dyspnea on exertion and weakness. He 

denes any rectal bleeding or melena. He denies hematemesis , dysphagia , 

constipation and early satiety but tells me that he has loss of appetite and 

has lost over 100lbs in the past year.  He had a partial gastrectomy for a 

bleeding ulcer at [facility] but has never had an EGD or a colonoscopy. According to 

the chart he suffered a respiratory arrest during a cystoscopy in the past. 

William does not recall this. Despite COPD he continues to smoke 2 PPD. His CT 

in  did reveal an R apical lesion ? pleural based. He does admit to being 

constipated.





- History Source


History Provided By: Patient


Limitations to Obtaining History: Poor Historian





- Past Medical History


Cardio/Vascular: Yes: HTN, Hyperlipdemia, Other (severe peripheral vascular 

disease by CT, suspect ASHD)


Pulmonary: Yes: COPD, Pneumonia (), Other (current smoker)


Gastrointestinal: Yes: Constipation, Peptic Ulcer Disease (with bleeding 

leading to partial gastrectomy)


Renal/: Yes: Cancer (bladder cancer- transitional cell, prostate cancer per 

the chart- William denies this), Renal Calculi, Other (PAST PROSTATE CA)


Heme/Onc: Yes: Anemia





- Past Surgical History


Additional Surgical History: s/p partial gastrectomy for bleeding ulcer





- Alcohol/Substance Use


Hx Alcohol Use: Yes (quit after ulcer surgery)


History of Substance Use: reports: None





- Smoking History


Smoking history: Current some day smoker


Have you smoked in the past 12 months: Yes


Aproximately how many cigarettes per day: 70 (2 PPD)





- Social History


Usual Living Arrangement: With Spouse


ADL: Independent


Occupation: Retired 


Place of Birth: [place] ([place]


Came to [place]year[place] age 20


History of Recent Travel: No





Home Medications





- Allergies


Allergies/Adverse Reactions: 


 Allergies











Allergy/AdvReac Type Severity Reaction Status Date / Time


 


No Known Allergies Allergy   Verified 18 15:33














- Home Medications


Home Medications: 


Ambulatory Orders





Montelukast Sodium [Singulair] 10 mg PO DAILY 18 











Family Disease History





- Family Disease History


Family Disease History: Other: Father ( in his 90's unclear causes), Mother 

( young unclear causes), Brother (1, healthy), Sister (1, healthy), Son (3, 

1  age 30 unclear causes), Daughter (2, 1  age 35 of unclear cancer)





Review of Systems





- Review of Systems


Constitutional: reports: Loss of Appetite, Malaise, Unintentional Wgt. Loss


Eyes: reports: No Symptoms


HENT: reports: No Symptoms


Neck: reports: No Symptoms


Cardiovascular: reports: Shortness of Breath


Respiratory: reports: SOB on Exertion


Gastrointestinal: reports: Constipation


Genitourinary: reports: No Symptoms


Musculoskeletal: reports: No Symptoms





Physical Exam-GI


Vital Signs: 


 Vital Signs











Temperature  97.7 F   08/15/18 06:00


 


Pulse Rate  91 H  08/15/18 06:00


 


Respiratory Rate  18   08/15/18 06:00


 


Blood Pressure  109/50   08/15/18 06:00


 


O2 Sat by Pulse Oximetry (%)  98   08/15/18 02:35








CBC,CMP











WBC  9.8 K/mm3 (4.0-10.0)   08/15/18  06:00    


 


RBC  3.96 M/mm3 (4.00-5.60)  L  08/15/18  06:00    


 


Hgb  7.5 GM/dL (11.7-16.9)  L  08/15/18  06:00    


 


Hct  24.6 % (35.4-49)  L D 08/15/18  06:00    


 


MCV  62.0 fl (80-96)  L D 08/15/18  06:00    


 


MCH  19.0 pg (25.7-33.7)  L D 08/15/18  06:00    


 


MCHC  30.7 g/dl (32.0-35.9)  L  08/15/18  06:00    


 


RDW  31.2 % (11.9-15.9)  H  08/15/18  06:00    


 


Plt Count  591 K/MM3 (134-434)  H D 08/15/18  06:00    


 


MPV  8.1 fl (7.5-11.1)   08/15/18  06:00    


 


Absolute Neuts (auto)  6.0 #  18  15:58    


 


Total Counted  100   18  15:58    


 


Neutrophils %  No Result Required.   18  15:58    


 


Neutrophils % (Manual)  69.0 % (42.8-82.8)   18  15:58    


 


Lymphocytes %  No Result Required.   18  15:58    


 


Lymphocytes % (Manual)  24.0 % (8-40)  D 18  15:58    


 


Monocytes % (Manual)  7 % (3.8-10.2)   18  15:58    


 


Nucleated RBC %  2 % (0-0)  H  18  15:58    


 


Differential Comment  Man diff performed   18  15:58    


 


Hypochromia  3+   18  15:58    


 


Platelet Estimate  Mod increased   18  15:58    


 


Platelet Comment     18  15:58    


 


Polychromasia  2+   18  15:58    


 


Poikilocytosis  1+   18  15:58    


 


Anisocytosis  3+   18  15:58    


 


Microcytosis  3+   18  15:58    


 


Macrocytosis  1+   18  15:58    


 


Ovalocytes  1+   18  15:58    


 


Retic Count  1.72 % (0.5-1.5)  H D 08/15/18  06:00    


 


Sodium  141 mmol/L (136-145)   08/15/18  06:00    


 


Potassium  4.3 mmol/L (3.5-5.1)   08/15/18  06:00    


 


Chloride  106 mmol/L ()   08/15/18  06:00    


 


Carbon Dioxide  29 mmol/L (21-32)   08/15/18  06:00    


 


Anion Gap  6  (8-16)  L  08/15/18  06:00    


 


BUN  17 mg/dL (7-18)   08/15/18  06:00    


 


Creatinine  0.5 mg/dL (0.7-1.3)  L  08/15/18  06:00    


 


Creat Clearance w eGFR  > 60  (>60)   08/15/18  06:00    


 


Random Glucose  79 mg/dL ()   08/15/18  06:00    


 


Calcium  7.7 mg/dL (8.5-10.1)  L  08/15/18  06:00    


 


Total Bilirubin  0.9 mg/dL (0.2-1.0)   08/15/18  06:00    


 


AST  12 U/L (15-37)  L D 08/15/18  06:00    


 


ALT  14 U/L (12-78)   08/15/18  06:00    


 


Alkaline Phosphatase  91 U/L ()  D 08/15/18  06:00    


 


Total Protein  5.7 g/dl (6.4-8.2)  L  08/15/18  06:00    


 


Albumin  2.9 g/dl (3.4-5.0)  L  08/15/18  06:00    


 


Vitamin B12  4308 pg/ml (180-914)  H D 08/15/18  06:00    








 Current Medications











Generic Name Dose Route Start Last Admin





  Trade Name Freq  PRN Reason Stop Dose Admin


 


Albuterol Sulfate  1 amp  18 23:45  





  Ventolin 0.083% Nebulizer Soln -  NEB   





  Q6H PRN   





  SHORT OF BREATH/WHEEZING   





     





     





     


 


Budesonide/Formoterol Fumarate  2 puff  08/15/18 10:00  08/15/18 09:19





  Symbicort 160/4.5mcg -  IH   2 puff





  BID SAM   Administration





     





     





     





     


 


Montelukast Sodium  10 mg  08/15/18 10:00  08/15/18 09:06





  Singulair -  PO   10 mg





  DAILY SAM   Administration





     





     





     





     


 


Nicotine  21 mg  08/15/18 10:30  





  Nicoderm Patch -  TD   





  DAILY SAM   





     





     





     





     











Constitutional: Yes: Calm


Eyes: Yes: Conjunctiva Clear


HENT: Yes: Normocephalic


Neck: Yes: Supple


Cardiovascular: Yes: Regular Rate and Rhythm


Respiratory: Yes: CTA Bilaterally


Gastrointestinal Inspection: Yes: Scars (healed upper midline vertical incision)


...Auscultate: Yes: Normoactive Bowel Sounds


...Palpate: Yes: Soft, Other (nontender)


...Rectal Exam: Yes: Guaiac Positive (brown guaiac positive stool, 2+ prostate)


Edema: No


Neurological: Yes: Alert, Oriented


Labs: 


 CBC, BMP





 08/15/18 06:00 





 08/15/18 06:00 





 INR, PTT











INR  1.12  (0.83-1.09)  H  18  15:58    














Assessment/Plan





Given his occult bleeding, anemia and weight loss William requires a 

comprehensive GI evaluation. I have proposed an EGD and a colonoscopy and 

discussed both procedures in detail with William including informing him of the 

potential for such complications as perforation and hemorrhage. He has granted 

an informed consent for both which I have scheduled for . Agree with 

transfusing today and jessica give bowel prep tomorrow.  Potential etiologies 

include colon cancer, a gastric anastomotic malignancy, recurrent ulcer, 

vascular ectasias among other etiologies. Will repeat CT scan of the chest, 

abdomen and pelvis to look for malignancy.

## 2018-08-15 NOTE — PN
Teaching Attending Note


Name of Resident: Naeem Fuller





ATTENDING PHYSICIAN STATEMENT





I saw and evaluated the patient.


I reviewed the resident's note and discussed the case with the resident.


I agree with the resident's findings and plan as documented.





ASSESSMENT AND PLAN:


Progressively worsening microcytic anemia over past 3 years, likely 

attributable to GI bleed - agree with need for GI workup.


Can administer IV iron while admitted - still has iron deficit despite 

transfusion.


Smoking history noted. If GI workup non-revealing then consider more braod 

screening for occult malignancy. Will follow with you.

## 2018-08-16 LAB
ANION GAP SERPL CALC-SCNC: 7 MMOL/L (ref 8–16)
BUN SERPL-MCNC: 17 MG/DL (ref 7–18)
CALCIUM SERPL-MCNC: 8.3 MG/DL (ref 8.5–10.1)
CHLORIDE SERPL-SCNC: 104 MMOL/L (ref 98–107)
CO2 SERPL-SCNC: 29 MMOL/L (ref 21–32)
CREAT SERPL-MCNC: 0.6 MG/DL (ref 0.7–1.3)
DEPRECATED RDW RBC AUTO: 32.4 % (ref 11.9–15.9)
GLUCOSE SERPL-MCNC: 165 MG/DL (ref 74–106)
HCT VFR BLD CALC: 30.9 % (ref 35.4–49)
HGB BLD-MCNC: 9.6 GM/DL (ref 11.7–16.9)
MCH RBC QN AUTO: 20.2 PG (ref 25.7–33.7)
MCHC RBC AUTO-ENTMCNC: 31 G/DL (ref 32–35.9)
MCV RBC: 64.9 FL (ref 80–96)
PLATELET # BLD AUTO: 565 K/MM3 (ref 134–434)
PMV BLD: 8.2 FL (ref 7.5–11.1)
POTASSIUM SERPLBLD-SCNC: 4.9 MMOL/L (ref 3.5–5.1)
RBC # BLD AUTO: 4.76 M/MM3 (ref 4–5.6)
SERUM IRON SATURATION: 2 % (ref 15–55)
SODIUM SERPL-SCNC: 140 MMOL/L (ref 136–145)
TIBC SERPL-MCNC: 448 UG/DL (ref 250–450)
UIBC SERPL-MCNC: 441 UG/DL (ref 111–343)
WBC # BLD AUTO: 7.9 K/MM3 (ref 4–10)

## 2018-08-16 RX ADMIN — PANTOPRAZOLE SODIUM SCH MG: 40 TABLET, DELAYED RELEASE ORAL at 21:38

## 2018-08-16 RX ADMIN — METHYLPREDNISOLONE SODIUM SUCCINATE SCH MG: 40 INJECTION, POWDER, FOR SOLUTION INTRAMUSCULAR; INTRAVENOUS at 03:08

## 2018-08-16 RX ADMIN — PANTOPRAZOLE SODIUM SCH MG: 40 TABLET, DELAYED RELEASE ORAL at 09:34

## 2018-08-16 RX ADMIN — METHYLPREDNISOLONE SODIUM SUCCINATE SCH MG: 40 INJECTION, POWDER, FOR SOLUTION INTRAMUSCULAR; INTRAVENOUS at 09:34

## 2018-08-16 RX ADMIN — POLYETHYLENE GLYCOL 3350 SCH GRAMS: 17 POWDER, FOR SOLUTION ORAL at 06:02

## 2018-08-16 RX ADMIN — BUDESONIDE AND FORMOTEROL FUMARATE DIHYDRATE SCH PUFF: 160; 4.5 AEROSOL RESPIRATORY (INHALATION) at 21:39

## 2018-08-16 RX ADMIN — NICOTINE SCH: 21 PATCH TRANSDERMAL at 09:35

## 2018-08-16 RX ADMIN — MONTELUKAST SODIUM SCH MG: 10 TABLET, COATED ORAL at 09:34

## 2018-08-16 RX ADMIN — POLYETHYLENE GLYCOL 3350 SCH GRAMS: 17 POWDER, FOR SOLUTION ORAL at 21:38

## 2018-08-16 RX ADMIN — POLYETHYLENE GLYCOL 3350 SCH: 17 POWDER, FOR SOLUTION ORAL at 14:30

## 2018-08-16 RX ADMIN — METHYLPREDNISOLONE SODIUM SUCCINATE SCH MG: 40 INJECTION, POWDER, FOR SOLUTION INTRAMUSCULAR; INTRAVENOUS at 21:39

## 2018-08-16 RX ADMIN — BUDESONIDE AND FORMOTEROL FUMARATE DIHYDRATE SCH PUFF: 160; 4.5 AEROSOL RESPIRATORY (INHALATION) at 09:35

## 2018-08-16 NOTE — PN
Progress Note, Physician


History of Present Illness: 





Pt w/o SOB, CP, palpitations, abd pain.


Pt doesn't have Nicotine Patch on ( per his nurse pt refused the patch), he 

states that nobody offered him the patch 





- Current Medication List


Current Medications: 


Active Medications





Albuterol Sulfate (Ventolin 0.083% Nebulizer Soln -)  1 amp NEB Q6H PRN


   PRN Reason: SHORT OF BREATH/WHEEZING


   Last Admin: 08/15/18 19:37 Dose:  1 amp


Bisacodyl (Dulcolax -)  20 mg PO ONCE ONE


   Stop: 08/16/18 18:01


Budesonide/Formoterol Fumarate (Symbicort 160/4.5mcg -)  2 puff IH BID [facility]


   Last Admin: 08/16/18 09:35 Dose:  2 puff


Methylprednisolone Sodium Succinate (Solu-Medrol -)  40 mg IVPUSH Q12H SAM


Montelukast Sodium (Singulair -)  10 mg PO DAILY [facility]


   Last Admin: 08/16/18 09:34 Dose:  10 mg


Nicotine (Nicoderm Patch -)  21 mg TD DAILY [facility]


   Last Admin: 08/16/18 09:35 Dose:  Not Given


Pantoprazole Sodium (Protonix -)  40 mg PO BID [facility]


   Last Admin: 08/16/18 09:34 Dose:  40 mg


Polyethylene Glycol (Miralax (For Daily Use) -)  17 gm PO TID [facility]


   Last Admin: 08/16/18 06:02 Dose:  17 grams











- Objective


Vital Signs: 


 Vital Signs











Temperature  98.1 F   08/16/18 10:00


 


Pulse Rate  86   08/16/18 10:00


 


Respiratory Rate  20   08/16/18 10:00


 


Blood Pressure  132/52   08/16/18 10:00


 


O2 Sat by Pulse Oximetry (%)  97   08/16/18 09:00











Constitutional: Yes: No Distress, Calm


Cardiovascular: Yes: Regular Rate and Rhythm, S1, S2


Respiratory: Yes: Regular, Rhonchi (scattered)


Gastrointestinal: Yes: Normal Bowel Sounds, Soft.  No: Tenderness


Edema: No


Neurological: Yes: Alert, Oriented


Labs: 


 CBC, BMP





 08/16/18 06:30 





 08/16/18 06:30 





 INR, PTT











INR  1.12  (0.83-1.09)  H  08/14/18  15:58    














Problem List





- Problems


(1) Anemia


Code(s): D64.9 - ANEMIA, UNSPECIFIED   





(2) COPD (chronic obstructive pulmonary disease)


Code(s): J44.9 - CHRONIC OBSTRUCTIVE PULMONARY DISEASE, UNSPECIFIED   





(3) CAD (coronary artery disease)


Code(s): I25.10 - ATHSCL HEART DISEASE OF NATIVE CORONARY ARTERY W/O ANG PCTRS 

  





(4) Diabetes mellitus


Code(s): E11.9 - TYPE 2 DIABETES MELLITUS WITHOUT COMPLICATIONS   





(5) PUD (peptic ulcer disease)


Code(s): K27.9 - PEPTIC ULC, SITE UNSP, UNSP AS AC OR CHR, W/O HEMOR OR PERF   





Assessment/Plan





s/p 3 units PRBC TX


Heme/Onco, GI, Pulmonary consults are appreciated.


Pt is scheduled for EGD and colonoscopy in AM. 


Pt with Hx/o possible cardiac arrest. Cardio Consult.


AM labs


OOBTC

## 2018-08-16 NOTE — PN
Progress Note (short form)





- Note


Progress Note: 





PULMONARY





States breathing is improved. No cough or wheezing. Wants to go home.





 Vital Signs











 Period  Temp  Pulse  Resp  BP Sys/Ty  Pulse Ox


 


 Last 24 Hr  98.6 F-98.6 F  83-90  20-20  121-124/61-66  98








Gen:  NAD at rest


Heart: RRR


Lung: distant breath sounds


Abd: soft, nontender


Ext: no edema





 CBC, BMP





 08/16/18 06:30 





 08/16/18 06:30 





Active Medications





Albuterol Sulfate (Ventolin 0.083% Nebulizer Soln -)  1 amp NEB Q6H PRN


   PRN Reason: SHORT OF BREATH/WHEEZING


   Last Admin: 08/15/18 19:37 Dose:  1 amp


Bisacodyl (Dulcolax -)  20 mg PO ONCE ONE


   Stop: 08/16/18 18:01


Budesonide/Formoterol Fumarate (Symbicort 160/4.5mcg -)  2 puff IH BID [facility]


   Last Admin: 08/16/18 09:35 Dose:  2 puff


Methylprednisolone Sodium Succinate (Solu-Medrol -)  40 mg IVPUSH Q6H-IV SAM


   Last Admin: 08/16/18 09:34 Dose:  40 mg


Montelukast Sodium (Singulair -)  10 mg PO DAILY [facility]


   Last Admin: 08/16/18 09:34 Dose:  10 mg


Nicotine (Nicoderm Patch -)  21 mg TD DAILY [facility]


   Last Admin: 08/16/18 09:35 Dose:  Not Given


Pantoprazole Sodium (Protonix -)  40 mg PO BID [facility]


   Last Admin: 08/16/18 09:34 Dose:  40 mg


Polyethylene Glycol (Miralax (For Daily Use) -)  17 gm PO TID [facility]


   Last Admin: 08/16/18 06:02 Dose:  17 grams





A/P


GI Bleed


Anemia


Acute COPD Exacerbation


Lung Mass stable


HTN


DM


CAD


Smoker





-  will decrease medrol


-  inhaled bronchodilators


-  monitor H/H


-  GI work up in progress


-  outpt f/u of lung mass


-  DVT prophylaxis


-  smoking cessation

## 2018-08-16 NOTE — CONS
CARDIOLOGY CONSULTATION

 

DATE OF CONSULTATION:

 

DATE OF DICTATION:  2018

 

REQUESTING PHYSICIAN:  Albino Javier MD

 

CHIEF COMPLAINT:  Anemia.

 

Patient is an 85-year-old gentleman who was admitted with severe anemia.  Patient has

longstanding history of chronic obstructive pulmonary disease/chronic bronchitis,

hypertension, hypertensive cardiovascular disease, tobacco abuse, history of peptic

ulcer disease back in the 1960s, was admitted because he was found to have an

extremely low hemoglobin.

 

On questioning, patient denies having chest pain or discomfort either at rest or with

exertion.  Has chronic exertional dyspnea.  No paroxysmal nocturnal dyspnea or

orthopnea reported.  There is no history of palpitations, although on a recent visit

office, he was found to have sinus tachycardia.  He has no history of abdominal pain

or discomfort.  There is no history of melena.  He does complain of constipation.  He

has chronic cough with expectoration.  Denies having hemoptysis.  There is no history

of diabetes mellitus.  According to his initial consultation back in 2017,

reported by his son that he had progressive memory loss.

 

PAST HISTORY:  History of cardiopulmonary arrest back in 2015, while undergoing

urological procedure after he had received Versed, Toradol, and ceftriaxone and was

hospitalized at [facility].

 

History of urinary bladder polyps.

 

SURGICAL HISTORY:  Status post gastric surgery for bleeding peptic ulcer disease back

in .

 

SOCIAL HISTORY:  , retired .  Has 2 sons and 3 daughters.  One of

his sons apparently  of organic brain syndrome at age 35.  He has been smoking

since his teenage years and used to smoke 4-5 packets per day, currently smoking 2

packets per day.  Drinks 4-5 cups of coffee and has a rare drink.

 

FAMILY HISTORY:  Father had  in his 80s, and mother  when he was 3 years old;

cause of their deaths is unknown.

 

He has 3 step-brothers and step-sisters and also has a brother and a sister from his

mother.  Two of his step-brothers are , and his sister  in her 40s,

apparently of a lymphatic tumor.

 

ALLERGIES:  None documented.

 

CURRENT MEDICATIONS:

1.  Symbicort 160/4.5 mcg 2 inhalations b.i.d.

2.  Methylprednisolone 40 mg IV q.6 hours.

3.  Nicotine patch 21 mg daily.

4.  Albuterol via nebulizer.

5.  Dulcolax 20 mg p.o. daily.

6.  MiraLAX 17 g p.o. t.i.d.

7.  Iron supplements 250 mg IV given on single occasion.

8.  Singulair 10 mg p.o. daily.

9.  Pantoprazole 40 mg p.o. b.i.d.

 

REVIEW OF SYSTEMS:

Constitutional:  No history of recent chills, fever, or night sweats.  He states that

there has been some weight loss.

HEENT:  No history of headaches, diplopia, blurred vision reported.  No history of

epistaxis.  Denies having hoarseness.  No history of tinnitus or deafness.

Respiratory:  See history of present illness.

Cardiovascular:  See history of present illness.

Gastrointestinal:  No history of nausea, vomiting, melena, or hematemesis.  History

of constipation.  Denies loss of appetite.

Genitourinary:  No history of dysuria or frequency reported.  History of nocturia. 

No history of hematuria.

Musculoskeletal:  No history of myalgias or arthralgias.

Endocrine:  No history of polyuria or polydipsia.

Neurological:  No history of seizures or syncope.  No history of focal weakness.  No

history of lightheadedness.

Hematological:  History of anemia, recently found to have significant decrease in

hematocrit.

Lymphatic:  No history of lymphadenopathy.

 

PHYSICAL EXAMINATION:

General:  An 85-year-old gentleman was in no acute distress.  No pallor was

appreciated.  No clubbing or jaundice.

Vital Signs:  Weight was 133.4 pounds.  Blood pressure 121/61 mmHg.  Pulse 90 beats

per minute and regular.  Respirations 20 per minute.  Oxygen saturation 98% on 2 L of

oxygen.

Neck:  Supple.  No jugular venous distention.  Hepatojugular reflux was negative. 

Carotids were 2+.  Upstrokes were normal.  No bruits were heard, and no thyromegaly

was present.

Heart:  PMI was in the fifth intercostal space.  Heart sounds were distant.  Unable

to appreciate murmurs or gallops.

Lungs:  Coarse breath sounds but decreased at the bases.  Scattered expiratory

wheezing.

Abdomen:  Soft, slightly protuberant, and nontender.  No hepatosplenomegaly or

palpable masses were felt.  There was a well-healed epigastric scar.  Bowel sounds

were present.  Unable to appreciate any bruit.

Extremities:  No calf tenderness or dependent edema.  Femoral pulses were 2+. 

Dorsalis pedis and posterior tibial pulses were weak.

 

LABORATORY DATA:  ECG is not available.

 

CBC 2018:  Hemoglobin was 5.4 g.  Repeat CBC 2018:  Hemoglobin

9.6 g with microcytic cell indices.  Platelet count on  was 850,000. 

Followup platelet count is 565,000.  Differential reveals 2% nucleated RBCs. 

Reticulocyte count was 1.72.  There were polychromasia, poikilocytosis, anisocytosis,

microcytosis, macrocytosis and ovalocytes.

 

Chemistry 2018:  Sodium 140, potassium 4.9, chloride 104, CO2 of 29

mmol/L.  BUN 17, creatinine 0.6 mg/dL.

 

Liver function tests on August 15, 2018:  Total bilirubin 0.9, AST 12, ALT 14,

alkaline phosphatase 91, total protein 5.7 g/dL, albumin 2.9 g/dL.  Vitamin B12 was

4308 pg/mL.

 

X-ray chest dated August 15, 2018:  Since the prior study of 2018, there

are still some coarse lung changes with prominent mediastinal and there may be some

minimal atelectatic changes at the left base.  Correlation is recommended.  If

symptoms persist, further imaging with CT may be helpful.

 

IMPRESSION:

1.  Chronic obstructive pulmonary disease/acute bronchitis.

2.  History of chest pains compatible with coronary artery disease, angina pectoris.

3.  History of hypertension.

4.  Hypercholesterolemia.

5.  Anemia and thrombocytosis, etiology to be determined.

6.  Right bundle branch block.

7.  Poor compliance.

 

RECOMMENDATION:

1.  Consider hematological evaluation.

2.  All his medications must be obtained from home for reconciliation.

3.  There appears to be no absolute cardiac contraindication for endoscopy.  Patient

will need close monitoring of cardiopulmonary status, especially in a previous

history of cardiopulmonary arrest after receiving Versed and Toradol.

4.  Patient had been on Cardizem  mg and should be resumed provided there are

no contraindications.

5.  Consider CT scan of the chest as there was suggestion of a widened mediastinum.

 

PROGNOSIS:  Guarded.

 

Thank you for your referral.

 

Yours sincerely,

 

 

GABRIEL JHAVERI M.D.

 

[id number]

DD: 2018 10:47

DT: 2018 11:36

Job #:  [id number]

## 2018-08-16 NOTE — PN
GI Progress Note


Subjective: 





GI NOte: The daytime nurse Augustin informed me that William refused to take the 

bowel prep and insisted on eating earlier today precluding a colonoscopy. I 

discussed this with him and the fact that colonoscopy cannot be done without a 

prep. I asked whether he is still willing to have the EGD if the schedule 

tomorrow permits it. He responded yes. I did tell him that this will require 

remaining NPO until tomorrow afternoon.  I did discuss his case with Dr Gibson 

earlier today who has stated steroids to optimize his pulmonary function for 

endoscopy. He is severely iron deficient. I reminded him that he could have an 

underlying cancer. His CT has not been read.





- Objective


Vital Signs: 


 Vital Signs











Temperature  98.3 F   08/16/18 20:33


 


Pulse Rate  93 H  08/16/18 20:33


 


Respiratory Rate  21   08/16/18 20:33


 


Blood Pressure  135/64   08/16/18 20:33


 


O2 Sat by Pulse Oximetry (%)  97   08/16/18 09:00











Constitutional: No Distress


...Auscultate: Yes: Normoactive Bowel Sounds


...Palpate: Yes: Soft, Other (nontender)


Labs: 


 CBC, BMP





 08/16/18 06:30 





 08/16/18 06:30 





 INR, PTT











INR  1.12  (0.83-1.09)  H  08/14/18  15:58    














Problem List





- Problems


(1) Iron deficiency anemia


Assessment/Plan: 


Given his weight loss and iron deficiency I suspect an underlying GHI 

malignancy and will try to do the EGD tomorrow as his stomach appears abnormal 

on the CT scan as does his rectal wall


Code(s): D50.9 - IRON DEFICIENCY ANEMIA, UNSPECIFIED   





(2) Weight loss


Code(s): R63.4 - ABNORMAL WEIGHT LOSS   





(3) GI (gastrointestinal bleed)


Code(s): K92.2 - GASTROINTESTINAL HEMORRHAGE, UNSPECIFIED   





(4) COPD exacerbation


Code(s): J44.1 - CHRONIC OBSTRUCTIVE PULMONARY DISEASE W (ACUTE) EXACERBATION   





(5) Cardiopulmonary arrest


Code(s): I46.9 - CARDIAC ARREST, CAUSE UNSPECIFIED

## 2018-08-17 VITALS — DIASTOLIC BLOOD PRESSURE: 44 MMHG | SYSTOLIC BLOOD PRESSURE: 123 MMHG | HEART RATE: 70 BPM

## 2018-08-17 VITALS — TEMPERATURE: 97.8 F

## 2018-08-17 LAB
ANION GAP SERPL CALC-SCNC: 8 MMOL/L (ref 8–16)
BUN SERPL-MCNC: 17 MG/DL (ref 7–18)
CALCIUM SERPL-MCNC: 8.2 MG/DL (ref 8.5–10.1)
CHLORIDE SERPL-SCNC: 103 MMOL/L (ref 98–107)
CO2 SERPL-SCNC: 28 MMOL/L (ref 21–32)
CREAT SERPL-MCNC: 0.6 MG/DL (ref 0.7–1.3)
DEPRECATED RDW RBC AUTO: 34.2 % (ref 11.9–15.9)
GLUCOSE SERPL-MCNC: 140 MG/DL (ref 74–106)
HCT VFR BLD CALC: 28.7 % (ref 35.4–49)
HGB BLD-MCNC: 9.1 GM/DL (ref 11.7–16.9)
MCH RBC QN AUTO: 20.4 PG (ref 25.7–33.7)
MCHC RBC AUTO-ENTMCNC: 31.6 G/DL (ref 32–35.9)
MCV RBC: 64.6 FL (ref 80–96)
PLATELET # BLD AUTO: 458 K/MM3 (ref 134–434)
PMV BLD: 8.3 FL (ref 7.5–11.1)
POTASSIUM SERPLBLD-SCNC: 4.3 MMOL/L (ref 3.5–5.1)
RBC # BLD AUTO: 4.45 M/MM3 (ref 4–5.6)
SODIUM SERPL-SCNC: 139 MMOL/L (ref 136–145)
WBC # BLD AUTO: 14.3 K/MM3 (ref 4–10)

## 2018-08-17 PROCEDURE — 0DD68ZX EXTRACTION OF STOMACH, VIA NATURAL OR ARTIFICIAL OPENING ENDOSCOPIC, DIAGNOSTIC: ICD-10-PCS | Performed by: INTERNAL MEDICINE

## 2018-08-17 PROCEDURE — 0DD98ZX EXTRACTION OF DUODENUM, VIA NATURAL OR ARTIFICIAL OPENING ENDOSCOPIC, DIAGNOSTIC: ICD-10-PCS | Performed by: INTERNAL MEDICINE

## 2018-08-17 PROCEDURE — 0DDA8ZX EXTRACTION OF JEJUNUM, VIA NATURAL OR ARTIFICIAL OPENING ENDOSCOPIC, DIAGNOSTIC: ICD-10-PCS | Performed by: INTERNAL MEDICINE

## 2018-08-17 RX ADMIN — PANTOPRAZOLE SODIUM SCH MG: 40 TABLET, DELAYED RELEASE ORAL at 11:43

## 2018-08-17 RX ADMIN — METHYLPREDNISOLONE SODIUM SUCCINATE SCH: 40 INJECTION, POWDER, FOR SOLUTION INTRAMUSCULAR; INTRAVENOUS at 13:38

## 2018-08-17 RX ADMIN — POLYETHYLENE GLYCOL 3350 SCH: 17 POWDER, FOR SOLUTION ORAL at 15:27

## 2018-08-17 RX ADMIN — METHYLPREDNISOLONE SODIUM SUCCINATE SCH: 40 INJECTION, POWDER, FOR SOLUTION INTRAMUSCULAR; INTRAVENOUS at 11:58

## 2018-08-17 RX ADMIN — NICOTINE SCH: 21 PATCH TRANSDERMAL at 11:42

## 2018-08-17 RX ADMIN — METHYLPREDNISOLONE SODIUM SUCCINATE SCH MG: 40 INJECTION, POWDER, FOR SOLUTION INTRAMUSCULAR; INTRAVENOUS at 10:00

## 2018-08-17 RX ADMIN — METHYLPREDNISOLONE SODIUM SUCCINATE SCH MG: 40 INJECTION, POWDER, FOR SOLUTION INTRAMUSCULAR; INTRAVENOUS at 11:43

## 2018-08-17 RX ADMIN — MONTELUKAST SODIUM SCH MG: 10 TABLET, COATED ORAL at 11:43

## 2018-08-17 RX ADMIN — POLYETHYLENE GLYCOL 3350 SCH: 17 POWDER, FOR SOLUTION ORAL at 05:12

## 2018-08-17 RX ADMIN — BUDESONIDE AND FORMOTEROL FUMARATE DIHYDRATE SCH PUFF: 160; 4.5 AEROSOL RESPIRATORY (INHALATION) at 11:43

## 2018-08-17 NOTE — PN
Progress Note (short form)





- Note


Progress Note: 





PULMONARY





States breathing is improved.


No cough or wheezing.


Wants to go home.





Appears stable post EGD





 


Gen:  NAD at rest


Heart: RRR


Lung: distant breath sounds


Abd: soft, nontender


Ext: no edema





labs/meds/notes reviewed


HGB 9.1





Active Medications





Albuterol Sulfate (Ventolin 0.083% Nebulizer Soln -)  1 amp NEB Q6H PRN


   PRN Reason: SHORT OF BREATH/WHEEZING


   Last Admin: 08/15/18 19:37 Dose:  1 amp


Bisacodyl (Dulcolax -)  20 mg PO ONCE ONE


   Stop: 08/16/18 18:01


Budesonide/Formoterol Fumarate (Symbicort 160/4.5mcg -)  2 puff IH BID [facility]


   Last Admin: 08/16/18 09:35 Dose:  2 puff


Methylprednisolone Sodium Succinate (Solu-Medrol -)  40 mg IVPUSH Q6H-IV [facility]


   Last Admin: 08/16/18 09:34 Dose:  40 mg


Montelukast Sodium (Singulair -)  10 mg PO DAILY [facility]


   Last Admin: 08/16/18 09:34 Dose:  10 mg


Nicotine (Nicoderm Patch -)  21 mg TD DAILY [facility]


   Last Admin: 08/16/18 09:35 Dose:  Not Given


Pantoprazole Sodium (Protonix -)  40 mg PO BID [facility]


   Last Admin: 08/16/18 09:34 Dose:  40 mg


Polyethylene Glycol (Miralax (For Daily Use) -)  17 gm PO TID [facility]


   Last Admin: 08/16/18 06:02 Dose:  17 grams





A/P


GI Bleed


Anemia


Acute COPD Exacerbation


Lung Mass stable


HTN


DM


CAD


Smoker





-  decrease medrol


-  inhaled bronchodilators


-  monitor H/H


-  GI work up in progress


-  outpt f/u of lung mass


-  DVT prophylaxis


-  smoking cessation





EMI WILLIAM MD

## 2018-08-17 NOTE — PN
Progress Note (short form)





- Note


Progress Note: 





GI Procedure Note: Please see scanned EGD report. No source of weight loss 

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 There was a normal Billroth II 

gastrectomy found. Prior to the procedure informed consent was obtained from 

the wife and son Des as William's ability to make decisions in now in question. 

Hope to be able to prep for colonoscopy on Monday





Problem List





- Problems


(1) Iron deficiency anemia


Code(s): D50.9 - IRON DEFICIENCY ANEMIA, UNSPECIFIED   





(2) Weight loss


Code(s): R63.4 - ABNORMAL WEIGHT LOSS   





(3) GI (gastrointestinal bleed)


Code(s): K92.2 - GASTROINTESTINAL HEMORRHAGE, UNSPECIFIED   





(4) COPD exacerbation


Code(s): J44.1 - CHRONIC OBSTRUCTIVE PULMONARY DISEASE W (ACUTE) EXACERBATION   





(5) Cardiopulmonary arrest


Code(s): I46.9 - CARDIAC ARREST, CAUSE UNSPECIFIED

## 2018-08-17 NOTE — PN
Progress Note, Physician


History of Present Illness: 





Pt w/o SOB, CP, palpitations, abd pain.


Pt w/o blood in stool, black stool, blood in the sputum.


Pt doesn't have Nicotine Patch and refusing one; he agrees with Wellbutrine





- Current Medication List


Current Medications: 


Active Medications





Albuterol Sulfate (Ventolin 0.083% Nebulizer Soln -)  1 amp NEB Q6H PRN


   PRN Reason: SHORT OF BREATH/WHEEZING


   Last Admin: 08/15/18 19:37 Dose:  1 amp


Budesonide/Formoterol Fumarate (Symbicort 160/4.5mcg -)  2 puff IH BID [facility]


   Last Admin: 08/16/18 21:39 Dose:  2 puff


Bupropion HCl (Wellbutrin Xl -)  150 mg PO DAILY [facility]


   Stop: 08/19/18 10:01


Bupropion HCl (Wellbutrin Xl -)  150 mg PO BID@1000,2000 [facility]


Methylprednisolone Sodium Succinate (Solu-Medrol -)  40 mg IVPUSH Q12H [facility]


   Last Admin: 08/16/18 21:39 Dose:  40 mg


Montelukast Sodium (Singulair -)  10 mg PO DAILY [facility]


   Last Admin: 08/16/18 09:34 Dose:  10 mg


Nicotine (Nicoderm Patch -)  21 mg TD DAILY [facility]


   Last Admin: 08/16/18 09:35 Dose:  Not Given


Pantoprazole Sodium (Protonix -)  40 mg PO BID [facility]


   Last Admin: 08/16/18 21:38 Dose:  40 mg


Polyethylene Glycol (Miralax (For Daily Use) -)  17 gm PO TID [facility]


   Last Admin: 08/17/18 05:12 Dose:  Not Given











- Objective


Vital Signs: 


 Vital Signs











Temperature  97.8 F   08/17/18 09:22


 


Pulse Rate  70   08/17/18 09:56


 


Respiratory Rate  17   08/17/18 09:56


 


Blood Pressure  123/44   08/17/18 09:56


 


O2 Sat by Pulse Oximetry (%)  100   08/17/18 09:56











Constitutional: Yes: No Distress, Calm


Cardiovascular: Yes: Regular Rate and Rhythm, S1, S2


Respiratory: Yes: Regular, Rhonchi (scattered)


Gastrointestinal: Yes: Normal Bowel Sounds, Soft.  No: Tenderness


Edema: No


Neurological: Yes: Alert, Oriented


Labs: 


 CBC, BMP





 08/17/18 07:09 





 08/17/18 07:09 





 INR, PTT











INR  1.12  (0.83-1.09)  H  08/14/18  15:58    














Problem List





- Problems


(1) Anemia


Code(s): D64.9 - ANEMIA, UNSPECIFIED   





(2) COPD (chronic obstructive pulmonary disease)


Code(s): J44.9 - CHRONIC OBSTRUCTIVE PULMONARY DISEASE, UNSPECIFIED   





(3) CAD (coronary artery disease)


Code(s): I25.10 - ATHSCL HEART DISEASE OF NATIVE CORONARY ARTERY W/O ANG PCTRS 

  





(4) Diabetes mellitus


Code(s): E11.9 - TYPE 2 DIABETES MELLITUS WITHOUT COMPLICATIONS   





(5) PUD (peptic ulcer disease)


Code(s): K27.9 - PEPTIC ULC, SITE UNSP, UNSP AS AC OR CHR, W/O HEMOR OR PERF   





Assessment/Plan





s/p 3 units PRBC TX


H/H is trending down; to monitor.


Heme/Onco, GI, Pulmonary, Cardio consults are appreciated.


Pt didn't drink the prep for colonoscopy; he had EGD this AM.


AM labs.


OOBTC.


Case was d/w pt's nurse.

## 2018-08-20 NOTE — PATH
Surgical Pathology Report



Patient Name:  MATTHEW SHARMA

Accession #:  [id number]

[facility]. Rec. #:  [id number]                                                        

   /Age/Gender:  1933 (Age: 85) / M

Account:  [id number]                                                          

             Location: [address] [place] [address]

Taken:  2018

Received:  2018

Reported:  2018

Physicians:  GALE Manzo M.D.

  



Specimen(s) Received

A: BX JEJUNUM 

B: ANASTOMOSIS RING BX 

C: BX DUODENUM 

D: BX GASTRIC POUCH 





Clinical History

Anemia, weight loss, rule out gastric cancer

Postoperative diagnosis: Bilroth II with gastrectomy, gastric AVM







Final Diagnosis

A. JEJUNUM, BIOPSY:

SMALL BOWEL MUCOSA WITHOUT SIGNIFICANT PATHOLOGIC FINDINGS.



B. ANASTOMOSIS, BIOPSY:

JUNCTIONAL (GASTRIC AND SMALL BOWEL)MUCOSA WITH MILD ACUTE AND CHRONIC

INFLAMMATION.



C. DUODENUM, BIOPSY:

SMALL BOWEL MUCOSA WITHOUT SIGNIFICANT PATHOLOGIC FINDINGS.



D. GASTRIC POUCH, BIOPSY:

GASTRIC OXYNTIC MUCOSA WITH MILD TO MODERATE CHRONIC GASTRITIS.

IMMUNOHISTOCHEMICAL STAIN FOR H. PYLORI IS NEGATIVE.







***Electronically Signed***

Zainab Meyer M.D.





Gross Description

A.  Received in formalin, labeled "biopsy jejunum" are 3 tan, irregular portions

of soft tissue ranging from 0.2-0.4 cm. in greatest dimension. The specimens are

submitted in toto in one cassette.



B.  Received in formalin, labeled "biopsy anastomosis" are 3 tan, irregular

portions of soft tissue ranging from 0.2-0.4 cm. in greatest dimension. The

specimens are submitted in toto in one cassette.



C.  Received in formalin, labeled "biopsy duodenum" are 3 tan, irregular

portions of soft tissue ranging from 0.1-0.7 cm. in greatest dimension. The

specimens are submitted in toto in one cassette.



D.  Received in formalin, labeled "biopsy gastric pouch" is a tan, irregular

portion of soft tissue measuring 0.4 cm. in greatest dimension. The specimen is

submitted in toto in one cassette.

## 2019-10-17 ENCOUNTER — HOSPITAL ENCOUNTER (INPATIENT)
Dept: HOSPITAL 74 - JER | Age: 84
LOS: 7 days | Discharge: NURSING, REHAB OR LONG-TERM CARE | DRG: 435 | End: 2019-10-24
Attending: SPECIALIST | Admitting: SPECIALIST
Payer: COMMERCIAL

## 2019-10-17 VITALS — BODY MASS INDEX: 18.3 KG/M2

## 2019-10-17 DIAGNOSIS — C67.9: ICD-10-CM

## 2019-10-17 DIAGNOSIS — Z85.46: ICD-10-CM

## 2019-10-17 DIAGNOSIS — F17.210: ICD-10-CM

## 2019-10-17 DIAGNOSIS — I45.10: ICD-10-CM

## 2019-10-17 DIAGNOSIS — Z66: ICD-10-CM

## 2019-10-17 DIAGNOSIS — J44.9: ICD-10-CM

## 2019-10-17 DIAGNOSIS — E43: ICD-10-CM

## 2019-10-17 DIAGNOSIS — R62.7: ICD-10-CM

## 2019-10-17 DIAGNOSIS — R00.0: ICD-10-CM

## 2019-10-17 DIAGNOSIS — Z85.51: ICD-10-CM

## 2019-10-17 DIAGNOSIS — E78.5: ICD-10-CM

## 2019-10-17 DIAGNOSIS — E86.0: ICD-10-CM

## 2019-10-17 DIAGNOSIS — J98.11: ICD-10-CM

## 2019-10-17 DIAGNOSIS — J45.909: ICD-10-CM

## 2019-10-17 DIAGNOSIS — R74.8: ICD-10-CM

## 2019-10-17 DIAGNOSIS — R64: ICD-10-CM

## 2019-10-17 DIAGNOSIS — K59.09: ICD-10-CM

## 2019-10-17 DIAGNOSIS — Z87.11: ICD-10-CM

## 2019-10-17 DIAGNOSIS — I10: ICD-10-CM

## 2019-10-17 DIAGNOSIS — I25.119: ICD-10-CM

## 2019-10-17 DIAGNOSIS — C78.7: Primary | ICD-10-CM

## 2019-10-17 DIAGNOSIS — R45.1: ICD-10-CM

## 2019-10-17 DIAGNOSIS — N39.0: ICD-10-CM

## 2019-10-17 DIAGNOSIS — R91.1: ICD-10-CM

## 2019-10-17 DIAGNOSIS — E11.51: ICD-10-CM

## 2019-10-17 LAB
ALBUMIN SERPL-MCNC: 4 G/DL (ref 3.4–5)
ALP SERPL-CCNC: 1064 U/L (ref 45–117)
ALT SERPL-CCNC: 102 U/L (ref 13–61)
ANION GAP SERPL CALC-SCNC: 5 MMOL/L (ref 8–16)
APPEARANCE UR: CLEAR
AST SERPL-CCNC: 140 U/L (ref 15–37)
BACTERIA # UR AUTO: 10.2 /HPF
BASOPHILS # BLD: 0.3 % (ref 0–2)
BILIRUB SERPL-MCNC: 1.2 MG/DL (ref 0.2–1)
BILIRUB UR STRIP.AUTO-MCNC: (no result) MG/DL
BNP SERPL-MCNC: 724.9 PG/ML (ref 5–450)
BUN SERPL-MCNC: 26.7 MG/DL (ref 7–18)
CALCIUM SERPL-MCNC: 9.8 MG/DL (ref 8.5–10.1)
CASTS URNS QL MICRO: 16 /LPF (ref 0–8)
CHLORIDE SERPL-SCNC: 97 MMOL/L (ref 98–107)
CO2 SERPL-SCNC: 38 MMOL/L (ref 21–32)
COLOR UR: (no result)
CREAT SERPL-MCNC: 0.7 MG/DL (ref 0.55–1.3)
DEPRECATED RDW RBC AUTO: 17.5 % (ref 11.9–15.9)
EOSINOPHIL # BLD: 0.1 % (ref 0–4.5)
EPITH CASTS URNS QL MICRO: 7.1 /HPF
GLUCOSE SERPL-MCNC: 84 MG/DL (ref 74–106)
HCT VFR BLD CALC: 53.1 % (ref 35.4–49)
HGB BLD-MCNC: 17 GM/DL (ref 11.7–16.9)
INR BLD: 1.03 (ref 0.83–1.09)
KETONES UR QL STRIP: (no result)
LEUKOCYTE ESTERASE UR QL STRIP.AUTO: (no result)
LIPASE SERPL-CCNC: 62 U/L (ref 73–393)
LYMPHOCYTES # BLD: 11.6 % (ref 8–40)
MCH RBC QN AUTO: 28.9 PG (ref 25.7–33.7)
MCHC RBC AUTO-ENTMCNC: 32 G/DL (ref 32–35.9)
MCV RBC: 90.5 FL (ref 80–96)
MONOCYTES # BLD AUTO: 7.1 % (ref 3.8–10.2)
NEUTROPHILS # BLD: 80.9 % (ref 42.8–82.8)
NITRITE UR QL STRIP: POSITIVE
PH UR: 5 [PH] (ref 5–8)
PLATELET # BLD AUTO: 174 K/MM3 (ref 134–434)
PMV BLD: 8 FL (ref 7.5–11.1)
POTASSIUM SERPLBLD-SCNC: 4.5 MMOL/L (ref 3.5–5.1)
PROT SERPL-MCNC: 9.2 G/DL (ref 6.4–8.2)
PROT UR QL STRIP: (no result)
PROT UR QL STRIP: NEGATIVE
PT PNL PPP: 12.1 SEC (ref 9.7–13)
RBC # BLD AUTO: 1 /HPF (ref 0–4)
RBC # BLD AUTO: 5.87 M/MM3 (ref 4–5.6)
SODIUM SERPL-SCNC: 140 MMOL/L (ref 136–145)
SP GR UR: 1.03 (ref 1.01–1.03)
UROBILINOGEN UR STRIP-MCNC: 2 MG/DL (ref 0.2–1)
WBC # BLD AUTO: 9.2 K/MM3 (ref 4–10)
WBC # UR AUTO: 5 /HPF (ref 0–5)

## 2019-10-17 NOTE — PDOC
Documentation entered by Susan Sebastian SCRIBE, acting as scribe for Adryan Crocker MD.








Adryan Crocker MD:  This documentation has been prepared by the Jaz josue Adrianna, SCRIBE, under my direction and personally reviewed by me in its 

entirety.  I confirm that the documentation accurately reflects all work, 

treatment, procedures, and medical decision making performed by me.  





Attending Attestation





- Resident


Resident Name: Vanita Smith





- ED Attending Attestation


I have performed the following: I have examined & evaluated the patient, The 

case was reviewed & discussed with the resident, I agree w/resident's findings 

& plan, Exceptions are as noted





- HPI


HPI: 


The patient is an 86 year old male, with a significant PMH of COPD (not on home 

O2), HTN, DM, asthma, metastatic bladder CA, heavy tobacco use, and a history 

of respiratory arrest, who presents to the ED with his daughter with agitation, 

decreased PO intake, generalized weakness, weight loss, and progressive urine 

and stool incontinence. History limited from pt due to clinical condition, 

mostly from pt's daughter who states they are unable to care for him anymore. 

Children request placement in a nursing facility and brought him in today for 

this reason.  Denies fevers, chills, dizziness, cp, sob, focal weakness/numbness

, abd pain, LE edema.





Allergies: NKA, NKDA


Past surgical history: abdominal Sx for peptic ulcer


PCP - Dr. Javier


Pulmonologist: Dr. Cade











- Physicial Exam


PE: 


Agree with resident exam











- Medical Decision Making








85yo F with MMP including metastatic bladder ca presents to the ED with failure 

to thrive picture


Vitals with hypoxia to 89%


Labs with hgb 17 (likely 2/2 hemoconcentration), will hydrate gently with 250cc 

bolus NS to start


trop+ 0.8 ?demand 


Concern for PE due to hypoxia, +trop, and active malignancy


Plan for CTH for agitation to look for brain mets and CTA to r/o PE


With regards to worsening urine/stool incontinence, it is possible pt also has 

spinal mets and will need MRI imaging to evaluate. Given concern for PE and 

elevated trop however, will initiate w/u in ED to r/o PE at first and defer to 

admitting team for MRI spine (if consistent with pt's GOC). 


Anticipate admission once CTH/CTA complete











**Heart Score/ECG Review


  ** #1





10/17/19 19:25


NSR, rate 98, normal axis. +RBBB, no SIMA

## 2019-10-17 NOTE — PDOC
History of Present Illness





- General


Chief Complaint: Diarrhea


Stated Complaint: DEHYDRATION/SWOLLEN FEET


Time Seen by Provider: 10/17/19 14:50





Past History





- Past Medical History


Allergies/Adverse Reactions: 


 Allergies











Allergy/AdvReac Type Severity Reaction Status Date / Time


 


No Known Allergies Allergy   Verified 08/14/18 15:33











Home Medications: 


Ambulatory Orders





Unobtainable  10/18/19 








Asthma: Yes


Cancer: No


COPD: Yes (EMPHYSEMA; RESPIRATORY ARREST S/P CYSTO)


Diabetes: No (LOW BL.SUGAR)


HTN: Yes





- Surgical History


Abdominal Surgery: Yes (STOMACH ULCER)





- Immunization History


Immunization Up to Date: Yes





- Psycho Social/Smoking Cessation Hx


Smoking History: Never smoked


Have you smoked in the past 12 months: No


Number of Cigarettes Smoked Daily: 70 (2 PPD)


Information on smoking cessation initiated: No


'Breaking Loose' booklet given: 03/01/18


Hx Alcohol Use: No


Drug/Substance Use Hx: No


Substance Use Type: None


Hx Substance Use Treatment: No





*Physical Exam





- Vital Signs


 Last Vital Signs











Temp Pulse Resp BP Pulse Ox


 


 98.3 F   97 H  16   109/54 L  89 L


 


 10/17/19 14:51  10/17/19 14:51  10/17/19 14:51  10/17/19 14:51  10/17/19 14:51














ED Treatment Course





- LABORATORY


CBC & Chemistry Diagram: 


 10/18/19 05:30





 10/18/19 05:30





Medical Decision Making





- Medical Decision Making


HPI: 


87yo M with PMH of COPD (not on home O2), HTN, DM, asthma, bladder CA, heavy 

tobacco use, dementia, and a history of respiratory arrest, who presents to the 

emergency department for unknown reasons. No family members are present at the 

bedside though the patient's daughter had accompanied him upon arrival. Patient 

is without acute complaints. Only oriented to person, but not place or time. 

History limited due to patient's dementia. 





Per chart review


PCP: Dr. Javier


Pulmonologist: Dr. Cade





ROS: unable to complete (dementia)





PE: 


General: Awake, alert, and oriented x 1, in no acute distress, cachectic


Head: No signs of trauma


Eyes: EOMI, sclera anicteric


ENT: Dry mucus membranes


Neck: Normal ROM, supple


Lungs: Diffuse crackles present bilaterally


Cardio: Regular rhythm, S1 and S2 present


Abdomen: Soft, nontender


Extremities: Distal pulses present, Pitting edema in bilateral feet


SKIN: Warm, Dry, normal turgor


Neurologic: Cranial nerves II through XII grossly intact. Normal speech





ED Course/MDM: 


DDX including but not limited to dehydration, failure to thrive, UTI, PNA, 

malignancy


Labs, EKG, CXR


10/17/19 15:42





Call to patient's son, Des Arndt, as listed in EMR. Patient has had swollen 

feet. Maybe dehydrated? Urine has been dark orange. He will call patient's 

daughter for additional history. 


10/17/19 16:10





EKG: rate 98, QTc 497, NSR, RBBB, twi in V1-V3





Patient's son, called back stating his sister, Tara, left the hospital. His 

sister, Susana, is on her way back to the hospital. 


10/17/19 16:40





Patient's daughters at the bedside. They state that patient has had 

deteriorating health since August. He has been eating poorly. In chronic pain. 

Urine "looks like molasses and is orange." Supposed to be on 3L home oxygen, 

but nonadherent. He has had worsening urinary and stool incontinence. 


They would like placement in a SNF as he is unable to perform ADLs.





 CBC











WBC  9.2 K/mm3 (4.0-10.0)   10/17/19  16:16    


 


RBC  5.87 M/mm3 (4.00-5.60)  H  10/17/19  16:16    


 


Hgb  17.0 GM/dL (11.7-16.9)  H  10/17/19  16:16    


 


Hct  53.1 % (35.4-49)  H  10/17/19  16:16    


 


MCV  90.5 fl (80-96)   10/17/19  16:16    


 


MCH  28.9 pg (25.7-33.7)   10/17/19  16:16    


 


MCHC  32.0 g/dl (32.0-35.9)   10/17/19  16:16    


 


RDW  17.5 % (11.9-15.9)  H  10/17/19  16:16    


 


Plt Count  174 K/MM3 (134-434)  D 10/17/19  16:16    


 


MPV  8.0 fl (7.5-11.1)   10/17/19  16:16    


 


Absolute Neuts (auto)  7.4 K/mm3 (1.5-8.0)   10/17/19  16:16    


 


Neutrophils %  80.9 % (42.8-82.8)   10/17/19  16:16    


 


Lymphocytes %  11.6 % (8-40)  D 10/17/19  16:16    


 


Monocytes %  7.1 % (3.8-10.2)   10/17/19  16:16    


 


Eosinophils %  0.1 % (0-4.5)   10/17/19  16:16    


 


Basophils %  0.3 % (0-2.0)   10/17/19  16:16    


 


Nucleated RBC %  0 % (0-0)   10/17/19  16:16    








No leukocytosis





 CMP











Sodium  140 mmol/L (136-145)   10/17/19  16:16    


 


Potassium  4.5 mmol/L (3.5-5.1)   10/17/19  16:16    


 


Chloride  97 mmol/L ()  L  10/17/19  16:16    


 


Carbon Dioxide  38 mmol/L (21-32)  H  10/17/19  16:16    


 


Anion Gap  5 MMOL/L (8-16)  L  10/17/19  16:16    


 


BUN  26.7 mg/dL (7-18)  H  10/17/19  16:16    


 


Creatinine  0.7 mg/dL (0.55-1.3)   10/17/19  16:16    


 


Est GFR (CKD-EPI)AfAm  99.04   10/17/19  16:16    


 


Est GFR (CKD-EPI)NonAf  85.45   10/17/19  16:16    


 


Random Glucose  84 mg/dL ()   10/17/19  16:16    


 


Calcium  9.8 mg/dL (8.5-10.1)   10/17/19  16:16    


 


Total Bilirubin  1.2 mg/dL (0.2-1)  H  10/17/19  16:16    


 


AST  140 U/L (15-37)  H  10/17/19  16:16    


 


ALT  102 U/L (13-61)  H  10/17/19  16:16    


 


Alkaline Phosphatase  1064 U/L ()  H  10/17/19  16:16    


 


Creatine Kinase  57 U/L ()   10/17/19  16:16    


 


Troponin I  0.78 ng/ml (0.00-0.05)  H*  10/17/19  16:16    


 


Total Protein  9.2 g/dl (6.4-8.2)  H  10/17/19  16:16    


 


Albumin  4.0 g/dl (3.4-5.0)   10/17/19  16:16    








Electrolytes unremarkable


Cr normal


Elevated ALT, AST, and ALP


Elevated Tpn, 0.78





UA with signs of infection; covered with Rocephin





Gentle fluid hydration with 250cc NS





Patient agitated; placed in vest, given ativan


Given agitation, CT head ordered. Patient with history of malignancy, elevated 

tpn, and hypoxia, decision made to obtain chest CTA to rule out PE.


Sent to CT scan


10/17/19 20:03





Daughters called and I updated them over the phone





Tara, [phone] 


Susana, [phone]


10/17/19 20:23





CXR as read by radiology: "Portable chest x-ray, AP sitting. Since prior chest x

-ray dated 8/15/2018, there are increased atelectatic changes in the left lung 

base. The cardiac silhouette remains probably within normal limits in size. 

Right lung is clear. Mediastinum and visualized osseous structures appear 

intact Impression: Interval atelectatic changes and probable infiltrates in the 

left lung base. "





CT Head as read by radiology: "CT scan of the brain. A noncontrast CT scan of 

the brain was performed. Compared to prior CT scan of the head dated 2/28/2018 

There is moderate volume loss and ventricular dilatation. Moderate to marked 

periventricular chronic microvascular ischemic changes are present No mass 

lesion, gross acute infarct or intracranial hemorrhage are identified. 

Visualized paranasal sinuses and mastoid air cells are well aerated. Moderate 

deviation of the nasal septum towards the left. Calcification of the cavernous 

carotid arteries are noted. The calvarium is intact . Impression: Moderate 

atrophy. No gross evidence of a focal intracranial lesion or hemorrhage is 

seen. Correlate clinically to determine further evaluation and follow-up. "





Pending CTA report


10/17/19 20:43





CTA as read by radiology: "A post intravenous contrast CT angiogram of the 

chest was performed utilizing pulmonary embolus protocol. Coronal/ sagittal 

reconstruction images were obtained. 76 cc of Omnipaque 350 was intravenously 

injected Compared to prior CT scan of the chest dated 8/16/2018 and prior CT 

scan of the abdomen pelvis dated 8/16/2018 No gross filling defect is seen 

within the main pulmonary artery and its proximal branches, bilaterally. Normal 

size and enhancement of the thoracic aorta with multiple calcified atheromatous 

plaques present. The heart is within normal limits in size. No gross 

mediastinal or hilar enlarged lymph nodes are identified. Moderately severe 

COPD changes again seen with atelectatic changes and airspace opacities in the 

left lung base. Previously visualized nodule in the medial aspect of the right 

lower lobe has significantly increased in size since see prior examination now 

measuring 2.5 x 1.3 cm appear to prior examination measured 11 mm. No other 

gross pulmonary nodules are identified. In included portion of the upper abdomen

, there are multiple low-attenuation densities in included portion of the liver 

with the largest measuring 7.5 cm highly suspicious for metastasis. 

Heterogeneous enhancement and multiple focal low-attenuation densities in the 

spleen that may be due to rapid intravenous contrast administration. Cannot 

rule out metastatic lesions. There are multiple dense calcified plaques in 

included abdominal aorta with a thin calcific density seen within the lumen of 

the included infrarenal abdominal aorta, axial image 124 which is the last 

obtained images suggestive of chronic dissections since it was present on the 

prior exam. Visualized osseous structures appear grossly intact Impression: See 

discussion above. There is no gross evidence of a pulmonary embolus in the main 

pulmonary artery and its proximal branches, bilaterally. Severe COPD changes 

again seen. Interval increase in size of previously visualized pleural-based 

nodule/mass in the medial aspect of the right lower lobe now measuring 2.5 x 

1.3 cm. Interval multiple low-attenuation densities in included portion of the 

liver with the largest measuring 7.5 cm that are highly suspicious for 

metastasis. Heterogeneous attenuation and enhancement of the spleen that may be 

due to rapid intravenous contrast administration. However, metastatic lesions 

cannot be excluded. "


10/17/19 21:11





Page to Dr. Javier, [phone]


10/17/19 21:18





Discussed case with Dr. Javier who accepted patient for telemetry admission 

under himself


Cardiology consult to Dr. Alonzo placed per Dr. Javier's request


10/17/19 21:40





Another 250cc ordered for gentle fluid hydration








Discharge





- Discharge Information


Problems reviewed: Yes


Clinical Impression/Diagnosis: 


 Bowel dysfunction





Failure to thrive


Qualifiers:


 Failure to thrive age range: in adult Qualified Code(s): R62.7 - Adult failure 

to thrive





UTI (urinary tract infection)


Qualifiers:


 Urinary tract infection type: site unspecified Hematuria presence: without 

hematuria Qualified Code(s): N39.0 - Urinary tract infection, site not specified





Condition: Guarded





- Admission


Yes





- Follow up/Referral





- Patient Discharge Instructions





- Post Discharge Activity

## 2019-10-17 NOTE — PDOC
Rapid Medical Evaluation


Time Seen by Provider: 10/17/19 14:50


Medical Evaluation: 


 Allergies











Allergy/AdvReac Type Severity Reaction Status Date / Time


 


No Known Allergies Allergy   Verified 08/14/18 15:33











10/17/19 14:50


I have performed a brief in-person evaluation of this patient.


The patient presents with a chief complaint of: loss of bowel function x months

, worse in last week. diagnosed with liver lesions 9/12, hx of COPD, HTN, DM, 

asthma, bladder cancer


PCP Albino Javier


Cardiologist Juliano Estrada


Pertinent physical exam findings: non tender abdomen, O2 sat 89%


I have ordered the following: labs, oxygen


The patient will proceed to the ED for further evaluation.








**Discharge Disposition





- Diagnosis


 Bowel dysfunction








- Discharge Dispostion


Condition at time of disposition: Stable





- Referrals





- Patient Instructions





- Post Discharge Activity

## 2019-10-18 LAB
ALBUMIN SERPL-MCNC: 3.5 G/DL (ref 3.4–5)
ALP SERPL-CCNC: 873 U/L (ref 45–117)
ALT SERPL-CCNC: 85 U/L (ref 13–61)
ANION GAP SERPL CALC-SCNC: 7 MMOL/L (ref 8–16)
AST SERPL-CCNC: 103 U/L (ref 15–37)
BILIRUB SERPL-MCNC: 1 MG/DL (ref 0.2–1)
BUN SERPL-MCNC: 31 MG/DL (ref 7–18)
CALCIUM SERPL-MCNC: 9.2 MG/DL (ref 8.5–10.1)
CHLORIDE SERPL-SCNC: 98 MMOL/L (ref 98–107)
CO2 SERPL-SCNC: 35 MMOL/L (ref 21–32)
CREAT SERPL-MCNC: 0.8 MG/DL (ref 0.55–1.3)
DEPRECATED RDW RBC AUTO: 17.7 % (ref 11.9–15.9)
GLUCOSE SERPL-MCNC: 117 MG/DL (ref 74–106)
HCT VFR BLD CALC: 45.5 % (ref 35.4–49)
HGB BLD-MCNC: 14.9 GM/DL (ref 11.7–16.9)
MCH RBC QN AUTO: 29.4 PG (ref 25.7–33.7)
MCHC RBC AUTO-ENTMCNC: 32.7 G/DL (ref 32–35.9)
MCV RBC: 89.8 FL (ref 80–96)
PLATELET # BLD AUTO: 163 K/MM3 (ref 134–434)
PMV BLD: 8.2 FL (ref 7.5–11.1)
POTASSIUM SERPLBLD-SCNC: 4.6 MMOL/L (ref 3.5–5.1)
PROT SERPL-MCNC: 7.6 G/DL (ref 6.4–8.2)
RBC # BLD AUTO: 5.06 M/MM3 (ref 4–5.6)
SODIUM SERPL-SCNC: 140 MMOL/L (ref 136–145)
WBC # BLD AUTO: 12 K/MM3 (ref 4–10)

## 2019-10-18 RX ADMIN — ASPIRIN SCH: 81 TABLET, COATED ORAL at 13:17

## 2019-10-18 RX ADMIN — SODIUM CHLORIDE SCH MLS/HR: 9 INJECTION, SOLUTION INTRAVENOUS at 00:48

## 2019-10-18 RX ADMIN — CEFTRIAXONE SCH MLS/HR: 1 INJECTION, POWDER, FOR SOLUTION INTRAMUSCULAR; INTRAVENOUS at 15:54

## 2019-10-18 RX ADMIN — INSULIN ASPART SCH: 100 INJECTION, SOLUTION INTRAVENOUS; SUBCUTANEOUS at 06:35

## 2019-10-18 RX ADMIN — INSULIN ASPART SCH: 100 INJECTION, SOLUTION INTRAVENOUS; SUBCUTANEOUS at 16:55

## 2019-10-18 NOTE — CON.CARD
Consult


Consult Specialty:: Cardiology


Referred by:: Dr. Javier





- History of Present Illness


History of Present Illness: 





CHIEF COMPLAINT:  


1. Weakness.


2. Lethargy.


 


Patient is an 86-year-old gentleman was brought to the emergency room by his 

daughter because of progressive lethargy, weakness, progressive weight loss and 

incontinence of both stool and urine.  Patient has longstanding history of 

coronary artery disease, angina pectoris, chronic bronchitis/COPD, O2 dependent

, hypertension, hypertensive cardiovascular disease, tobacco abuse.


 


According to his daughter he has developed progressive lethargy and generalized 

weakness, apparently his appetite has been poor he also has been losing control 

over his bodily functions and has lost a significant amount of weight. No 

history of chest pain or discomfort is available, has chronic dyspnea and has a 

chronic cough. No history of expectoration and hemoptysis is available.  

Patient is poorly responsive and unable to provide a history.





PAST HISTORY:  


History of cardiopulmonary arrest back in 2015, while undergoing 

urological procedure after he had received Versed, Toradol, and ceftriaxone and 

was hospitalized at [facility].


History of urinary bladder polyps.


? Cancer of the prostate.


Status post peptic ulcer disease in the 1960s.


History of anemia.


? History of dementia.





SURGICAL HISTORY:  


Status post gastric surgery for bleeding peptic ulcer disease back in .


No information is available regarding other surgical procedures.


 


SOCIAL HISTORY:  , retired .  Has 2 sons and 3 daughters.  

One of his sons apparently  of organic brain syndrome at age 35.  He has 

been smoking since his teenage years and used to smoke 4-5 packets per day, 

currently smoking 2 packets per day.  Drinks 4-5 cups of coffee and has a rare 

drink (obtained from previous history).


 


FAMILY HISTORY:  Father had  in his 80s, mother  when he was 3 years old

; cause of their deaths is unknown. He has 3 step-brothers and step-sisters and 

also has a brother and a sister from his mother.  Two of his step-brothers are 

, and his sister  in her 40s, apparently of "lymphatic tumor."


 


ALLERGIES:  None documented.





Active Medications











Generic Name Dose Route Start Last Admin





  Trade Name Freq  PRN Reason Stop Dose Admin


 


Aspirin  81 mg  10/18/19 10:00  10/18/19 13:17





  Ecotrin -  PO   Not Given





  DAILY SAM   





     





     





     





     


 


Sodium Chloride  1,000 mls @ 42 mls/hr  10/18/19 00:45  10/18/19 00:48





  Normal Saline -  IV   42 mls/hr





  ASDIR SAM   Administration





     





     





     





     


 


Insulin Aspart  1 vial  10/18/19 07:00  10/18/19 06:35





  Novolog Vial Sliding Scale -  SQ   Not Given





  BIDAC [facility]   





     





     





  Protocol   





     














REVIEW OF SYSTEMS:


Constitutional:  History is not available. History of unintentional weight loss.


HEENT:  History is not available.


Respiratory:  See history of present illness.


Cardiovascular:  See history of present illness.


Gastrointestinal:  According to daughter patient has had bowel incontinence and 

has poor appetite.


Genitourinary:  History of urinary incontinence. 


Musculoskeletal:  History is not available.


Endocrine:  History is not available.


Neurological:  See history of present illness.


Hematological: History is not available.


Lymphatic:  History is not available.


 


PHYSICAL EXAMINATION:


General:  86-year-old gentleman was in no acute distress.  No pallor was 

appreciated.  No clubbing or jaundice.





 Last Vital Signs











Temperature  98.9 F   10/18/19 10:00


 


Pulse Rate  105 H  10/18/19 10:00


 


Respiratory Rate  26 H  10/18/19 10:00


 


Blood Pressure  131/58 L  10/18/19 10:00


 


O2 Sat by Pulse Oximetry (%)  93 L  10/18/19 09:00














Neck:  Supple.  No jugular venous distention.  Hepatojugular reflux was 

negative.  Carotids were 2+.  Upstrokes were normal.  No bruits were heard, and 

no thyromegaly was present.


Heart:  PMI was in the fifth intercostal space.  Heart sounds were distant.  

Unable to appreciate murmurs, gallops or rubs.


Lungs:  Decreased breath sounds bilaterally, fine crepitations at the left base.


Abdomen:  Soft and nontender.  Liver is 4 to 5 fingerbreadth below the right 

intercostal margin, is probable nodular and is nontender. No splenomegaly is 

appreciated.  Well-healed midline abdominal scar. No masses were felt.


Extremities:  No calf tenderness or dependent edema.  Femoral pulses were 2+.  

Dorsalis pedis and posterior tibial pulses were not palpable.





ECG: Dated: 10/17/19


NORMAL SINUS RHYTHM


RIGHT ATRIAL ENLARGEMENT


RIGHT BUNDLE BRANCH BLOCK


CANNOT RULE OUT INFERIOR INFARCT , AGE UNDETERMINED


ABNORMAL ECG


WHEN COMPARED WITH ECG OF 2018 19:07,


RIGHT BUNDLE BRANCH BLOCK HAS REPLACED NON-SPECIFIC INTRA-VENTRICULAR


CONDUCTION BLOCK


MINIMAL CRITERIA FOR INFERIOR INFARCT ARE NOW PRESENT





Chest x-ray Dated: 10/17/19


Impression: Interval atelectatic changes and probable infiltrates in the left 

lung base. 


 


CTA Chest: Dated: 10/17/19


Impression: See discussion above. There is no gross evidence of a pulmonary 

embolus in the main pulmonary artery and its proximal branches, bilaterally. 

Severe COPD changes again seen. Interval increase in size of previously 

visualized pleural-based nodule/mass in the medial aspect of the right lower 

lobe now measuring 2.5 x 1.3 cm. Interval multiple low-attenuation densities in 

included portion of the liver with the largest measuring 7.5 cm that are highly 

suspicious for metastasis. Heterogeneous attenuation and enhancement of the 

spleen that may be due to rapid intravenous contrast administration. However, 

metastatic lesions cannot be excluded. 








LAB DATA:





 Laboratory Results - last 24 hr











  10/17/19 10/17/19 10/17/19





  16:16 16:16 16:16


 


WBC  9.2  


 


RBC  5.87 H  


 


Hgb  17.0 H  


 


Hct  53.1 H  


 


MCV  90.5  


 


MCH  28.9  


 


MCHC  32.0  


 


RDW  17.5 H  


 


Plt Count  174  D  


 


MPV  8.0  


 


Absolute Neuts (auto)  7.4  


 


Neutrophils %  80.9  


 


Lymphocytes %  11.6  D  


 


Monocytes %  7.1  


 


Eosinophils %  0.1  


 


Basophils %  0.3  


 


Nucleated RBC %  0  


 


PT with INR   


 


INR   


 


Sodium   140 


 


Potassium   4.5 


 


Chloride   97 L 


 


Carbon Dioxide   38 H 


 


Anion Gap   5 L 


 


BUN   26.7 H 


 


Creatinine   0.7 


 


Est GFR (CKD-EPI)AfAm   99.04 


 


Est GFR (CKD-EPI)NonAf   85.45 


 


Random Glucose   84 


 


Calcium   9.8 


 


Total Bilirubin   1.2 H 


 


AST   140 H 


 


ALT   102 H 


 


Alkaline Phosphatase   1064 H 


 


Creatine Kinase    57


 


Troponin I    0.78 H*


 


B-Natriuretic Peptide    724.9 H


 


Total Protein   9.2 H 


 


Albumin   4.0 


 


Lipase    62 L


 


Urine Color   


 


Urine Appearance   


 


Urine pH   


 


Ur Specific Gravity   


 


Urine Protein   


 


Urine Glucose (UA)   


 


Urine Ketones   


 


Urine Blood   


 


Urine Nitrite   


 


Urine Bilirubin   


 


Urine Urobilinogen   


 


Ur Leukocyte Esterase   


 


Urine WBC (Auto)   


 


Urine RBC (Auto)   


 


Urine Casts (Auto)   


 


U Epithel Cells (Auto)   


 


U Sm Round Cell (Auto)   


 


Urine Bacteria (Auto)   














  10/17/19 10/17/19 10/17/19





  16:16 17:50 23:45


 


WBC   


 


RBC   


 


Hgb   


 


Hct   


 


MCV   


 


MCH   


 


MCHC   


 


RDW   


 


Plt Count   


 


MPV   


 


Absolute Neuts (auto)   


 


Neutrophils %   


 


Lymphocytes %   


 


Monocytes %   


 


Eosinophils %   


 


Basophils %   


 


Nucleated RBC %   


 


PT with INR  12.10  


 


INR  1.03  


 


Sodium   


 


Potassium   


 


Chloride   


 


Carbon Dioxide   


 


Anion Gap   


 


BUN   


 


Creatinine   


 


Est GFR (CKD-EPI)AfAm   


 


Est GFR (CKD-EPI)NonAf   


 


Random Glucose   


 


Calcium   


 


Total Bilirubin   


 


AST   


 


ALT   


 


Alkaline Phosphatase   


 


Creatine Kinase   


 


Troponin I    0.61 H*


 


B-Natriuretic Peptide   


 


Total Protein   


 


Albumin   


 


Lipase   


 


Urine Color   Orange 


 


Urine Appearance   Clear 


 


Urine pH   5.0 


 


Ur Specific Gravity   1.028 


 


Urine Protein   2+ H 


 


Urine Glucose (UA)   Negative 


 


Urine Ketones   Trace H 


 


Urine Blood   Negative 


 


Urine Nitrite   Positive H 


 


Urine Bilirubin   2+ H 


 


Urine Urobilinogen   2.0 


 


Ur Leukocyte Esterase   Trace 


 


Urine WBC (Auto)   5 


 


Urine RBC (Auto)   1 


 


Urine Casts (Auto)   16 


 


U Epithel Cells (Auto)   7.1 


 


U Sm Round Cell (Auto)   None 


 


Urine Bacteria (Auto)   10.2 














  10/18/19 10/18/19





  05:30 05:30


 


WBC  12.0 H 


 


RBC  5.06 


 


Hgb  14.9 


 


Hct  45.5 


 


MCV  89.8 


 


MCH  29.4 


 


MCHC  32.7 


 


RDW  17.7 H 


 


Plt Count  163 


 


MPV  8.2 


 


Absolute Neuts (auto)  


 


Neutrophils %  


 


Lymphocytes %  


 


Monocytes %  


 


Eosinophils %  


 


Basophils %  


 


Nucleated RBC %  


 


PT with INR  


 


INR  


 


Sodium   140


 


Potassium   4.6


 


Chloride   98


 


Carbon Dioxide   35 H


 


Anion Gap   7 L


 


BUN   31.0 H


 


Creatinine   0.8


 


Est GFR (CKD-EPI)AfAm   93.75


 


Est GFR (CKD-EPI)NonAf   80.89


 


Random Glucose   117 H


 


Calcium   9.2


 


Total Bilirubin   1.0


 


AST   103 H


 


ALT   85 H


 


Alkaline Phosphatase   873 H


 


Creatine Kinase   88


 


Troponin I   0.60 H


 


B-Natriuretic Peptide  


 


Total Protein   7.6


 


Albumin   3.5


 


Lipase  


 


Urine Color  


 


Urine Appearance  


 


Urine pH  


 


Ur Specific Gravity  


 


Urine Protein  


 


Urine Glucose (UA)  


 


Urine Ketones  


 


Urine Blood  


 


Urine Nitrite  


 


Urine Bilirubin  


 


Urine Urobilinogen  


 


Ur Leukocyte Esterase  


 


Urine WBC (Auto)  


 


Urine RBC (Auto)  


 


Urine Casts (Auto)  


 


U Epithel Cells (Auto)  


 


U Sm Round Cell (Auto)  


 


Urine Bacteria (Auto)  














IMPRESSION:


1.  Athrosclerotic heart disease/angina pectoris, suspect acute coronary 

syndrome.


2.  Abnormal liver function test, hepatomegaly, and abdominal CT is highly 

suspicious for metastatic disease.


3.  Oxygen dependent COPD/chronic bronchitis.


4.  Tobacco abuse.


5.  Dehydration, corrected.


6.  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


7.  Elevated BNP most likely related to either advanced COPD, congestive heart 

failure and possibly related to advanced carcinoma.


8.  ? history of dementia.








RECOMMENDATION:


1.   As patient is unable to take oral medications because of lethargy etc. 

consider placing him on topical nitrates.


2.   Followup ECG and enzymes. 


3.   Wife and daughter were aware of his cardiac status and overall medical 

status.


4.   Further evaluation and workup is in progress.


 


PROGNOSIS: Critical.


 


Thank you for your referral.


 


Yours sincerely,


 


 


GABRIEL JHAVERI M.D.


Face-to-face time spent with patient, evaluating hospital records and with 

family 45 minutes.





- Past Medical History


Cardio/Vascular: Yes: HTN, Hyperlipdemia, Other (severe peripheral vascular 

disease by CT, suspect ASHD)


Pulmonary: Yes: COPD, Pneumonia (), Other (current smoker, home oxygen 

dependent)


Gastrointestinal: Yes: Constipation, Peptic Ulcer Disease (with bleeding 

leading to partial gastrectomy)


Renal/: Yes: Cancer (bladder cancer- transitional cell, prostate cancer per 

the chart- William denies this), Renal Calculi, Other (PAST PROSTATE CA)


Endocrine: Yes: Diabetes Mellitus





- Alcohol/Substance Use


Hx Alcohol Use: No


History of Substance Use: reports: None





- Smoking History


Smoking history: Former smoker


Have you smoked in the past 12 months: No


Aproximately how many cigarettes per day: 70 (2 PPD)





- Social History


Usual Living Arrangement: With Spouse


ADL: Independent


Occupation: Retired 


History of Recent Travel: No





Home Medications





- Allergies


Allergies/Adverse Reactions: 


 Allergies











Allergy/AdvReac Type Severity Reaction Status Date / Time


 


No Known Allergies Allergy   Verified 18 15:33














- Home Medications


Home Medications: 


Ambulatory Orders





Unobtainable  10/18/19 








Vital Signs: 


 Vital Signs











Temperature  98.9 F   10/18/19 10:00


 


Pulse Rate  105 H  10/18/19 10:00


 


Respiratory Rate  26 H  10/18/19 10:00


 


Blood Pressure  131/58 L  10/18/19 10:00


 


O2 Sat by Pulse Oximetry (%)  93 L  10/18/19 09:00














- Other Data


Labs, Other Data: 


 CBC, BMP





 10/18/19 05:30 





 10/18/19 05:30 





 INR, PTT











INR  1.03  (0.83-1.09)   10/17/19  16:16    








 Troponin, BNP











  10/17/19 10/17/19 10/18/19





  16:16 23:45 05:30


 


Troponin I  0.78 H*  0.61 H*  0.60 H


 


B-Natriuretic Peptide  724.9 H  








 Troponin, BNP











  10/17/19 10/17/19 10/18/19





  16:16 23:45 05:30


 


Troponin I  0.78 H*  0.61 H*  0.60 H


 


B-Natriuretic Peptide  724.9 H

## 2019-10-18 NOTE — PN
Teaching Attending Note


Name of Resident: Giulia Nam





ATTENDING PHYSICIAN STATEMENT





I saw and evaluated the patient.


I reviewed the resident's note and discussed the case with the resident.


I agree with the resident's findings and plan as documented.








SUBJECTIVE:








OBJECTIVE:








ASSESSMENT AND PLAN:


PROBABLE METASTATIC CA


WASTING SYNDROME


POSSIBLE SEPSIS SECONDARY TO 


   LUNG V.  SOURCE


AWAIT SEPSIS W/U


EMPIRIC ZITHROMAX/CEFTRIAXONE


DISCUSSED WITH DAUGHTER AT BEDSIDE

## 2019-10-18 NOTE — CONSULT
Consult - text type





- Consultation


Consultation Note: 





The patient is an 86 year old male, with a significant PMH of COPD, HTN, DM, 

asthma, metastatic bladder CA, heavy tobacco use, and a history of respiratory 

arrest, who presents to the ED with his daughter with agitation, decreased PO 

intake, generalized weakness, weight loss, and progressive urinary  and bowel 

incontinence. 


He is combative, agitated, confused. Unable to provide history








Allergies: NKA, NKDA


Past surgical history: abdominal Sx for peptic ulcer


PCP - Dr. Javier


Pulmonologist: Dr. Cade











- Physicial Exam


PE: 


Cor: RSR, No murmurs, No gallops


Lungs: Clear to P&A


Abd: Soft, Normal bowel sounds, No organomegaly


Ext:No significant edema





Labs/Meds reviewed





A/P








86 year old male, with a significant PMH of COPD, HTN, DM, asthma, metastatic 

bladder CA, heavy tobacco use, and a history of respiratory arrest, who 

presents to the ED with his daughter with agitation, decreased PO intake, 

generalized weakness, weight loss, and progressive urinary  and bowel 

incontinence. 


He is combative, agitated, confused. Unable to provide history


Failure to thrive





CT chest --severe COPD, increased size of right pleural based mass, multiple 

liver lesions ? mets


CT head -- moderate atrophy





Given the overall picture --failure to thrive? dementia, , presumed metastatic 

bladder cancer, would consider hospice care.


will consult palliative team to facilitate this

## 2019-10-18 NOTE — CONSULT
<Kris Jain P - Last Filed: 10/18/19 15:30>





- Consultation


REQUESTING PROVIDER: Vascular Surgery - Jovanni Lewis





CONSULT REQUEST: We have been asked to surgically evaluate this patient for ? 

AAA.





PCP: Albino Javier





History Source: Family Member (Daughters: Terrie and Susana) & Current/Past 

medical charting





HPI: Called to eval 85 yo male w/ extensive PMHx as noted below. Brought to 

[facility] ED for further evaluation of his declining condition --> (lung nodule (

refused f/u & or work-up), more recently diagnosed with liver mets (HCP 

declined out-pt work up and treatment). Progressive lethargy, weakness, 

progressive weight loss and incontinence of both stool and urine. Patient is 

poorly responsive and unable to provide a history. 





Smoking history: 3-4 packs/ day since a was a teenager 





PMHx: 


Cardiopulmonary arrest back in April 2015, while undergoing urological 

procedure after he had received Versed, Toradol, and ceftriaxone.


HTN, HLD, Angina Pectoris, PVD, Asthma, COPD (home O2 dependant), PNA, 

Pulmonary nodules, Constipation, PUD (w/ h/o bleed --> partial gastrectomy), 


Liver metastasis, Bladder/Prostate CA (transitional cell), Renal calculi, Anemia

, DM    





PSHx


Partial gastrectomy (bleeding Peptic Ulcer) 1967      





Home Meds


ASA 81 mg PO daily


Novolog





Allergies: NKDA





ROS: Unable to obtain





PE:


GEN: lethargic. weak. failure to thrive.


ABD: Soft, nt, nd, no guarding, no rebound, no masses.  No organomegaly. No 

palpable pulsatile mass.


UE: 2+ pulses, warm, well-perfused. No cyanosis. Cap refill <2 seconds. No 

peripheral edema.


LE: 2+ pulses, warm, well-perfused. No calf tenderness. No peripheral edema. 


SKIN: Warm, dry, normal turgor, no rashes or lesions noted.





 Last Vital Signs











Temp Pulse Resp BP Pulse Ox


 


 99.1 F   102 H  28 H  131/69   93 L


 


 10/18/19 13:00  10/18/19 13:00  10/18/19 13:00  10/18/19 13:00  10/18/19 09:00








 CBC, BMP





 10/18/19 05:30 





 10/18/19 05:30 





 INR, PTT











INR  1.03  (0.83-1.09)   10/17/19  16:16    























Problem List





- Problems


(1) Failure to thrive in adult


Assessment/Plan: 


85 yo male admitted to [facility] w/ lethargy, weakness, poor appetite, fecal/urinary 

incontinence. Recently diagnosed with liver metastasis. Patient had CTA which 

identified multiple dense calcified plaques in the abdominal aorta w/ thin 

calcific density w/i the lumen of infrarenal abdominal aorta. No evidence of 

dilation or acute dissection.





Cont care per ICU


No further vascular surgery input needed


Reconsult PRN


Above plan discussed with my attending and agrees.


On behalf of Dr. Lewis, thank you for the opportunity to participate in 

your patient's care.








Code(s): R62.7 - ADULT FAILURE TO THRIVE   





(2) COPD (chronic obstructive pulmonary disease)


Code(s): J44.9 - CHRONIC OBSTRUCTIVE PULMONARY DISEASE, UNSPECIFIED   





(3) HTN (hypertension)


Code(s): I10 - ESSENTIAL (PRIMARY) HYPERTENSION   





(4) PUD (peptic ulcer disease)


Code(s): K27.9 - PEPTIC ULC, SITE UNSP, UNSP AS AC OR CHR, W/O HEMOR OR PERF   





(5) Pneumonia


Code(s): J18.9 - PNEUMONIA, UNSPECIFIED ORGANISM   


Qualifiers: 


   Pneumonia type: due to unspecified organism   Laterality: left   Lung 

location: lower lobe of lung   Qualified Code(s): J18.1 - Lobar pneumonia, 

unspecified organism   





(6) Weight loss


Code(s): R63.4 - ABNORMAL WEIGHT LOSS   





(7) Liver metastasis


Code(s): C78.7 - SECONDARY MALIG NEOPLASM OF LIVER AND INTRAHEPATIC BILE DUCT   





Visit type





- Case Type


Case Type: ED Admission





- Emergency


Emergency Visit: Yes


ED Registration Date: 10/17/19


Care time: The patient presented to the Emergency Department on the above date 

and was hospitalized for further evaluation of their emergent condition.





- New patient


This patient is new to me today: Yes


Date on this admission: 10/18/19





<Jovanni Lewis - Last Filed: 10/21/19 08:55>





- Consultation


REQUESTING PROVIDER: 





CONSULT REQUEST: We have been asked to surgically evaluate this patient for (

specify).





PCP:Albino Javier





HISTORY OF PRESENT ILLNESS:





PMHx:          





PSHx:        


 Home Medications











 Medication  Instructions  Recorded


 


Unobtainable  10/18/19








 Allergies











Allergy/AdvReac Type Severity Reaction Status Date / Time


 


No Known Allergies Allergy   Verified 08/14/18 15:33








REVIEW OF SYSTEMS:


CONSTITUTIONAL: 


Absent:  fever, chills, diaphoresis, generalized weakness, malaise, loss of 

appetite, weight change


CARDIOVASCULAR: 


Absent: chest pain, syncope, palpitations, irregular heart rate, lightheadedness

, peripheral edema


RESPIRATORY: 


Absent: cough, shortness of breath, dyspnea with exertion, wheezing, stridor, 

hemoptysis


GASTROINTESTINAL:


Absent: abdominal pain, abdominal distension, nausea, vomiting, diarrhea, 

constipation, melena, hematochezia


GENITOURINARY: 


Absent: dysuria, frequency, urgency, hesitancy, hematuria, flank pain, genital 

pain


MUSCULOSKELETAL: 


Absent: myalgia, arthralgia, joint swelling, back pain, neck pain


SKIN: 


Absent: rash, itching, pallor


HEMATOLOGIC/IMMUNOLOGIC: 


Absent: easy bleeding, easy bruising, lymphadenopathy


NEUROLOGIC: 


Absent: headache, focal weakness, paresthesias, dizziness, unsteady gait, 

seizure, mental status changes, 


bladder or bowel incontinence


PSYCHIATRIC: 


Absent: anxiety, depression, suicidal or homicidal ideation, hallucinations.





PHYSICAL EXAM:


GENERAL: Awake, alert, and fully oriented, in no acute distress.


HEAD: Normal with no signs of trauma.


EYES: PERRL, sclera anicteric, conjunctiva clear.


NECK: Normal ROM, supple without lymphadenopathy, JVD, or masses.


LUNGS: Clear to auscultation bilat anteriorly. No wheezes, and no crackles. No 

accessory muscle use.


HEART: Regular rate and rhythm. No murmurs


ABDOMEN: Soft, nontender, not distended, normoactive bowel sounds, no guarding, 

no rebound, no masses.  No organomegaly. 


MUSCULOSKELETAL: Normal ROM at all joints. No bony deformities or tenderness. 

No CVA tenderness.


UPPER EXTREMITIES: 2+ pulses, warm, well-perfused. No cyanosis. Cap refill <2 

seconds. No peripheral edema.


LOWER EXTREMITIES: 2+ pulses, warm, well-perfused. No calf tenderness. No 

peripheral edema. 


NEUROLOGICAL: Normal speech, gait not observed.


PSYCH: Cooperative. Good eye contact. Appropriate mood and affect.


SKIN: Warm, dry, normal turgor, no rashes or lesions noted.


 Vital Signs











Temperature  97.9 F   10/21/19 05:35


 


Pulse Rate  97 H  10/21/19 05:35


 


Respiratory Rate  16   10/21/19 05:35


 


Blood Pressure  130/64   10/21/19 05:35


 


O2 Sat by Pulse Oximetry (%)  96   10/20/19 21:00








 Lab Results











WBC  7.6 K/mm3 (4.0-10.0)   10/20/19  05:45    


 


RBC  4.74 M/mm3 (4.00-5.60)   10/20/19  05:45    


 


Hgb  13.9 GM/dL (11.7-16.9)   10/20/19  05:45    


 


Hct  42.8 % (35.4-49)   10/20/19  05:45    


 


MCV  90.3 fl (80-96)   10/20/19  05:45    


 


MCHC  32.5 g/dl (32.0-35.9)   10/20/19  05:45    


 


RDW  17.4 % (11.9-15.9)  H  10/20/19  05:45    


 


Plt Count  149 K/MM3 (134-434)   10/20/19  05:45    


 


Sodium  140 mmol/L (136-145)   10/20/19  05:45    


 


Potassium  4.6 mmol/L (3.5-5.1)   10/20/19  05:45    


 


Chloride  101 mmol/L ()   10/20/19  05:45    


 


Carbon Dioxide  34 mmol/L (21-32)  H  10/20/19  05:45    


 


Anion Gap  5 MMOL/L (8-16)  L  10/20/19  05:45    


 


BUN  30.1 mg/dL (7-18)  H  10/20/19  05:45    


 


Creatinine  0.5 mg/dL (0.55-1.3)  L  10/20/19  05:45    


 


Random Glucose  89 mg/dL ()   10/20/19  05:45    


 


Calcium  9.0 mg/dL (8.5-10.1)   10/20/19  05:45    


 


INR  1.03  (0.83-1.09)   10/17/19  16:16    








History reviewed, CT scan reviewed.


No abdominal aortic aneurysm present on CT.


Arterial wall calcification, no active occlusive disease.


No vascular intervention needed at this time.

## 2019-10-18 NOTE — EKG
Test Reason : 

Blood Pressure : ***/*** mmHG

Vent. Rate : 098 BPM     Atrial Rate : 098 BPM

   P-R Int : 122 ms          QRS Dur : 126 ms

    QT Int : 390 ms       P-R-T Axes : 087 070 052 degrees

   QTc Int : 497 ms

 

NORMAL SINUS RHYTHM

RIGHT ATRIAL ENLARGEMENT

RIGHT BUNDLE BRANCH BLOCK

CANNOT RULE OUT INFERIOR INFARCT , AGE UNDETERMINED

ABNORMAL ECG

WHEN COMPARED WITH ECG OF 28-FEB-2018 19:07,

RIGHT BUNDLE BRANCH BLOCK HAS REPLACED NON-SPECIFIC INTRA-VENTRICULAR

CONDUCTION BLOCK

MINIMAL CRITERIA FOR INFERIOR INFARCT ARE NOW PRESENT

Confirmed by WENDY DEMPSEY MD ([id number]) on 10/18/2019 12:51:24 PM

 

Referred By:             Confirmed By:WENDY DEMPSEY MD

## 2019-10-18 NOTE — HP
Admitting History and Physical





- Primary Care Physician


PCP: Albino Javier





- Admission


Chief Complaint: health decline


History of Present Illness: 





Pt with known Hx/o advanced COPD on continue oxygen  supplementation, smoker (

up to 3-4 packs/ day at maximum; started since a teenager; recently up to 2 

packs per day), lung nodule ( refused follow up and work up), anemia (requiring 

PRBC transfusion, refused full workup),  liver mets (recent diagnosis, HCP 

decline outpatient work up and treatment -after consulting with her sister, 

brother and mother) brought in by his family for steady decline in his health, 

for the last few days with minimal PO intake (eating and drinking), stool and 

urine incontinency, lethargy. 


History Source: Family Member (Daughters: Terrie and Susana; Pt's Susana carey, and 

his wife are at bedside.)





- Past Medical History


Cardiovascular: Yes: CAD, HTN, Hyperlipdemia, Other (severe peripheral vascular 

disease by CT; cardiac arrest)


Pulmonary: Yes: COPD, Pneumonia (2/18), Other (current smoker, home oxygen 

dependent Lung nodule)


Gastrointestinal: Yes: Constipation, Peptic Ulcer Disease (with bleeding 

leading to partial gastrectomy), Other (Liver metastasis)


Renal/: Yes: Cancer (bladder cancer- transitional cell, prostate cancer per 

the chart- William denies this), Renal Calculi


Heme/Onc: Yes: Anemia


Endocrine: Yes: Diabetes Mellitus (diet controlled)





- Smoking History


Smoking history: Former smoker


Have you smoked in the past 12 months: No


Aproximately how many cigarettes per day: 70 (2 PPD)





- Alcohol/Substance Use


Hx Alcohol Use: No


History of Substance Use: reports: None





- Social History


ADL: Independent


Occupation: Retired 


History of Recent Travel: No





Home Medications





- Allergies


Allergies/Adverse Reactions: 


 Allergies











Allergy/AdvReac Type Severity Reaction Status Date / Time


 


No Known Allergies Allergy   Verified 08/14/18 15:33














- Home Medications


Home Medications: 


Ambulatory Orders





Albuterol 2.5/Ipratropium 0.5 [Duoneb -] 1 amp NEB Q6H PRN  amp 10/24/19 


Aspirin Coated [Ecotrin -] 81 mg PO DAILY  tablet.ec 10/24/19 


Cefuroxime Axetil [Ceftin -] 250 mg PO BID #2 tablet MDD 2 10/24/19 


Insulin Sliding Scale [Novolog Vial Sliding Scale -] 1 vial SQ BIDAC  units 10/

24/19 


Nicotine Patch [Nicoderm Patch -] 21 mg TD DAILY  patch 10/24/19 


Olanzapine [Zyprexa -] 5 mg PO HS  tablet 10/24/19 











Review of Systems


Findings/Remarks: 





unable to obtain secondary to lethargy





Physical Examination


Vital Signs: 


 Vital Signs











Temperature  99.1 F   10/18/19 13:00


 


Pulse Rate  102 H  10/18/19 13:00


 


Respiratory Rate  28 H  10/18/19 13:00


 


Blood Pressure  131/69   10/18/19 13:00


 


O2 Sat by Pulse Oximetry (%)  93 L  10/18/19 09:00











Findings/Remarks: 





limited, secondary to pt's lack of cooperation.


Constitutional: Yes: No Distress, Other (on face mask)


Cardiovascular: Yes: Regular Rate and Rhythm, S1, S2


Respiratory: Yes: Regular, Rhonchi (minimal. scaterred), Other (coarse breath 

sound)


Gastrointestinal: Yes: Normal Bowel Sounds, Soft.  No: Tenderness


...Rectal Exam: Yes: Deferred


Renal/: No: Bladder Distention


Edema: No


Neurological: Yes: Other (Arousable)


Labs: 


 CBC, BMP





 10/18/19 05:30 





 10/18/19 05:30 











Imaging





- Results


Chest X-ray: Report Reviewed


Cat Scan: Report Reviewed





Problem List





- Problems


(1) Cardiac enzymes elevated


Code(s): R74.8 - ABNORMAL LEVELS OF OTHER SERUM ENZYMES   





(2) Failure to thrive in adult


Code(s): R62.7 - ADULT FAILURE TO THRIVE   





(3) Liver metastasis


Code(s): C78.7 - SECONDARY MALIG NEOPLASM OF LIVER AND INTRAHEPATIC BILE DUCT   





(4) CAD (coronary artery disease)


Code(s): I25.10 - ATHSCL HEART DISEASE OF NATIVE CORONARY ARTERY W/O ANG PCTRS 

  





(5) UTI (urinary tract infection)


Code(s): N39.0 - URINARY TRACT INFECTION, SITE NOT SPECIFIED   


Qualifiers: 


   Urinary tract infection type: site unspecified   Hematuria presence: without 

hematuria   Qualified Code(s): N39.0 - Urinary tract infection, site not 

specified   





(6) COPD (chronic obstructive pulmonary disease)


Code(s): J44.9 - CHRONIC OBSTRUCTIVE PULMONARY DISEASE, UNSPECIFIED   





(7) PUD (peptic ulcer disease)


Code(s): K27.9 - PEPTIC ULC, SITE UNSP, UNSP AS AC OR CHR, W/O HEMOR OR PERF   





(8) GI (gastrointestinal bleed)


Code(s): K92.2 - GASTROINTESTINAL HEMORRHAGE, UNSPECIFIED   





(9) Diabetes mellitus


Code(s): E11.9 - TYPE 2 DIABETES MELLITUS WITHOUT COMPLICATIONS   





Assessment/Plan





Admit to Telemetry


serial CE


Cardio consult; pt's condition was d/w Dr Barr over the phone and at bedside; 

considering pt's PMHX/o PUD, anemia requiring PRBC transfusions and incomplete 

work-up, possible abdominal aorta "chronic dissections" on chest CTA, general 

patient condition, Heparin IV drip is considered rather detrimental than 

beneficial. 


Vasc Sx consult.


IV abtx, ID consult.


Pt's condition was d/w both daughter over the phone; they would like to know 

about pt's liver lessions/mets origine. Onco consult.


Prognosis: poor.


DNR and DNI were reviewed with pt's daughter, HCP; she is considering DNR but 

wants to talk with her sister.


AM labs.


Pt's condition was reviewed with pt's nurse

## 2019-10-18 NOTE — CONSULT
Consult


Consult Specialty:: Infectious Disease


Referred by:: Dr Javier


Reason for Consultation:: UTI





- History of Present Illness


Chief Complaint: Worsening Generalized weakness, failure to thrive


History of Present Illness: 





Pt is a an 87 yo M with PMHx of COPD (not on home O2), HTN, DM, asthma, 

metastatic bladder CA, prostate cancer, s/p gastrectomy (50) current everyday 

smoker, and a history of respiratory arrest ( during outpt cystoscopy), 

brought in by family from doctor's office for increased lethargy. Pt is a heavy 

smoker, smoking up to 2PPD even over this period while frequently needing to 

use his nebulizer daily. Pt is unable to provide hx and family unable to say if 

he had new cough prior to admission, but has been noted to have increased cough 

and secretions while here. No fevers, no chills but pt complains about 

temperature extremes frequently. Pt has been unable to ambulate for over 3 

months, initially barely able to get to bedside commode, now needs to be 

carried.  Pt has poor PO intake, now with both bladder and bowel incontinence, 

making it difficult for family to take care of him. In Aug 2019 they were told 

by PMD that pt probably had lung metastasis and needed further work up that 

they did not follow through with due to his frailty.  Per chart, they are open 

to having the pt go to SNF and have also signed DNI/DNR.


Pt has been more combative of late and was given benzos. When I saw him he was 

not commmunicative.





Tachycardic, tachypneic, EKG- 


UA- nitrite positive, WBC-5, trace LE


WBC- 9.3>>12.0, ALP-1064>>873


Received Ceftriaxone in  yesterday





CT head- Mod atrophy, no focal ICH


CTA chest: Severe COPD, atelectasis and airspace opacities LLB. Nodule RLL now 

2.5 x1.3cm, 11mm


Densities in liver, largest 7.5cm suspicous for metastasis


CXR-possible infiltrate LLB





PSHx: Hemicolectomy -50 years ago





Social Hx: Lives with wife


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


Chronic heavy smoker since teenage years up to 2PPD


Denies ETOH, drugs





Fhx: Multiple cancers- 2 siblings in 70s, daughter-  at 40 of lung cancer





All: No known





- Past Medical History


Cardio/Vascular: Yes: HTN, Hyperlipdemia, Other (severe peripheral vascular 

disease by CT, suspect ASHD)


Pulmonary: Yes: COPD, Pneumonia (), Other (current smoker, home oxygen 

dependent)


Gastrointestinal: Yes: Constipation, Peptic Ulcer Disease (with bleeding 

leading to partial gastrectomy)


Renal/: Yes: Cancer (bladder cancer- transitional cell, prostate cancer per 

the chart- William denies this), Renal Calculi, Other (PAST PROSTATE CA)


Endocrine: Yes: Diabetes Mellitus





- Alcohol/Substance Use


Hx Alcohol Use: No


History of Substance Use: reports: None





- Smoking History


Smoking history: Former smoker


Have you smoked in the past 12 months: No


Aproximately how many cigarettes per day: 70 (2 PPD)





- Social History


Usual Living Arrangement: With Spouse


ADL: Independent


Occupation: Retired 


History of Recent Travel: No





Home Medications





- Allergies


Allergies/Adverse Reactions: 


 Allergies











Allergy/AdvReac Type Severity Reaction Status Date / Time


 


No Known Allergies Allergy   Verified 18 15:33














- Home Medications


Home Medications: 


Ambulatory Orders





Unobtainable  10/18/19 











Review of Systems





- Review of Systems


Constitutional: reports: Chills, Loss of Appetite, Unintentional Wgt. Loss.  

denies: Fever


Respiratory: reports: Cough, SOB


Genitourinary: reports: Incontinence


Psychiatric: reports: Altered Sleep Pattern





Physical Exam


Vital Signs: 


 Vital Signs











Temperature  99.1 F   10/18/19 13:00


 


Pulse Rate  102 H  10/18/19 13:00


 


Respiratory Rate  28 H  10/18/19 13:00


 


Blood Pressure  131/69   10/18/19 13:00


 


O2 Sat by Pulse Oximetry (%)  93 L  10/18/19 09:00











Constitutional: Yes: Cachectic, Thin


Eyes: Yes: Other (miosed, reactive)


HENT: Yes: Atraumatic, Other (dry mucous mebranes)


Neck: Yes: Supple


Cardiovascular: Yes: Tachycardia, S1, S2


Respiratory: Yes: Cough, Diminished


Gastrointestinal: Yes: Soft, Hypoactive Bowel Sounds.  No: Tenderness


Edema: Yes


Edema: LLE: 1+, RLE: 1+


Peripheral Pulses WNL: Yes


Neurological: Yes: Lethargy


Labs: 


 CBC, BMP





 10/18/19 05:30 





 10/18/19 05:30 











Imaging





- Results


Chest X-ray: Report Reviewed, Image Reviewed


Cat Scan: Report Reviewed, Image Reviewed





Assessment/Plan





 Urine Test Results











Urine Color  Orange   10/17/19  17:50    


 


Urine Appearance  Clear   10/17/19  17:50    


 


Urine pH  5.0  (5.0-8.0)   10/17/19  17:50    


 


Ur Specific Gravity  1.028  (1.010-1.035)   10/17/19  17:50    


 


Urine Protein  2+  (NEGATIVE)  H  10/17/19  17:50    


 


Urine Glucose (UA)  Negative  (NEGATIVE)   10/17/19  17:50    


 


Urine Ketones  Trace  (NEGATIVE)  H  10/17/19  17:50    


 


Urine Blood  Negative  (NEGATIVE)   10/17/19  17:50    


 


Urine Nitrite  Positive  (NEGATIVE)  H  10/17/19  17:50    


 


Urine Bilirubin  2+  (NEGATIVE)  H  10/17/19  17:50    


 


Ur Leukocyte Esterase  Trace  (NEGATIVE)   10/17/19  17:50    











 Current Medications





Aspirin (Ecotrin -)  81 mg PO DAILY [facility]


   Last Admin: 10/18/19 13:17 Dose:  Not Given


Sodium Chloride (Normal Saline -)  1,000 mls @ 42 mls/hr IV ASDIR SAM


   Last Admin: 10/18/19 00:48 Dose:  42 mls/hr


Azithromycin (Zithromax 500mg Ivpb (Pre-Docked))  500 mg in 250 mls @ 250 mls/

hr IVPB ONCE ONE


   Stop: 10/18/19 16:27


Ceftriaxone Sodium 1 gm/ (Dextrose)  50 mls @ 200 mls/hr IVPB DAILY [facility]; 

Protocol


Azithromycin 250 mg/ Dextrose  250 mls @ 250 mls/hr IVPB DAILY [facility]


Insulin Aspart (Novolog Vial Sliding Scale -)  1 vial SQ BIDAC [facility]; Protocol


   Last Admin: 10/18/19 06:35 Dose:  Not Given





Assessment/Plan:





Pt is a an 87 yo M with PMHx of COPD (not on home O2), HTN, DM, asthma, 

metastatic bladder CA, prostate cancer, s/p gastrectomy (50) current everyday 

smoker, and a history of respiratory arrest ( during outpt cystoscopy), 

brought in by family from doctor's office for increased lethargy. 





COPD (not on home O2), 


HTN, 


DM, 


asthma, 


metastatic bladder CA, 


prostate cancer, 


s/p gastrectomy (50yrs) 


current everyday smoker, 


history of respiratory arrest


Lung nodules- stable


Liver mets


Lung infiltrates


Positive UA


Debilitating disease


Wasting syndrome


Possible mets unclear primary





Plan:


Sepsis likely secondary to CAP, with possible UTI


Legionella AG


Bcx


Sputum cx


Ucx pending


Ceftriaxone 1g daily - day 2


Azithromycin 500mg stat, 250daily (EKG in am- prolonged QTC)








D/W Dr Gerry Nam MD PGY 3


Infectious Disease Rotation











Visit type





- Emergency Visit


Emergency Visit: Yes


ED Registration Date: 10/17/19


Care time: The patient presented to the Emergency Department on the above date 

and was hospitalized for further evaluation of their emergent condition.





- New Patient


This patient is new to me today: Yes


Date on this admission: 10/18/19





- Critical Care


Critical Care patient: No





ATTENDING PHYSICIAN STATEMENT





I saw and evaluated the patient.


I reviewed the resident's note and discussed the case with the resident.


I agree with the resident's findings and plan as documented.








SUBJECTIVE:








OBJECTIVE:








ASSESSMENT AND PLAN:

## 2019-10-19 LAB
ALBUMIN SERPL-MCNC: 3.1 G/DL (ref 3.4–5)
ALP SERPL-CCNC: 870 U/L (ref 45–117)
ALT SERPL-CCNC: 86 U/L (ref 13–61)
ANION GAP SERPL CALC-SCNC: 6 MMOL/L (ref 8–16)
AST SERPL-CCNC: 135 U/L (ref 15–37)
BILIRUB SERPL-MCNC: 1.1 MG/DL (ref 0.2–1)
BUN SERPL-MCNC: 33.2 MG/DL (ref 7–18)
CALCIUM SERPL-MCNC: 9.1 MG/DL (ref 8.5–10.1)
CHLORIDE SERPL-SCNC: 99 MMOL/L (ref 98–107)
CO2 SERPL-SCNC: 36 MMOL/L (ref 21–32)
CREAT SERPL-MCNC: 0.6 MG/DL (ref 0.55–1.3)
DEPRECATED RDW RBC AUTO: 17.4 % (ref 11.9–15.9)
GLUCOSE SERPL-MCNC: 89 MG/DL (ref 74–106)
HCT VFR BLD CALC: 43.3 % (ref 35.4–49)
HGB BLD-MCNC: 14.2 GM/DL (ref 11.7–16.9)
MCH RBC QN AUTO: 29.3 PG (ref 25.7–33.7)
MCHC RBC AUTO-ENTMCNC: 32.7 G/DL (ref 32–35.9)
MCV RBC: 89.8 FL (ref 80–96)
PLATELET # BLD AUTO: 165 K/MM3 (ref 134–434)
PMV BLD: 7.9 FL (ref 7.5–11.1)
POTASSIUM SERPLBLD-SCNC: 4.4 MMOL/L (ref 3.5–5.1)
PROT SERPL-MCNC: 7 G/DL (ref 6.4–8.2)
RBC # BLD AUTO: 4.82 M/MM3 (ref 4–5.6)
SODIUM SERPL-SCNC: 141 MMOL/L (ref 136–145)
WBC # BLD AUTO: 10.2 K/MM3 (ref 4–10)

## 2019-10-19 RX ADMIN — CEFTRIAXONE SCH MLS/HR: 1 INJECTION, POWDER, FOR SOLUTION INTRAMUSCULAR; INTRAVENOUS at 09:07

## 2019-10-19 RX ADMIN — ASPIRIN SCH MG: 81 TABLET, COATED ORAL at 09:13

## 2019-10-19 RX ADMIN — SODIUM CHLORIDE SCH MLS/HR: 9 INJECTION, SOLUTION INTRAVENOUS at 10:37

## 2019-10-19 RX ADMIN — INSULIN ASPART SCH: 100 INJECTION, SOLUTION INTRAVENOUS; SUBCUTANEOUS at 17:10

## 2019-10-19 RX ADMIN — INSULIN ASPART SCH: 100 INJECTION, SOLUTION INTRAVENOUS; SUBCUTANEOUS at 06:24

## 2019-10-19 NOTE — PN
Progress Note, Physician


Chief Complaint: 





calm, s/p ativan last night; opens eyes; not in distress


meds, chart tests and consults appreciated





- Current Medication List


Current Medications: 


Active Medications





Aspirin (Ecotrin -)  81 mg PO DAILY [facility]


   Last Admin: 10/19/19 09:13 Dose:  81 mg


Sodium Chloride (Normal Saline -)  1,000 mls @ 42 mls/hr IV ASDIR [facility]


   Last Admin: 10/19/19 10:37 Dose:  42 mls/hr


Ceftriaxone Sodium 1 gm/ (Dextrose)  50 mls @ 200 mls/hr IVPB DAILY [facility]; 

Protocol


   Last Admin: 10/19/19 09:07 Dose:  200 mls/hr


Azithromycin 250 mg/ Dextrose  250 mls @ 250 mls/hr IVPB DAILY [facility]


   Last Admin: 10/19/19 10:37 Dose:  250 mls/hr


Insulin Aspart (Novolog Vial Sliding Scale -)  1 vial SQ BIDAC [facility]; Protocol


   Last Admin: 10/19/19 06:24 Dose:  Not Given


Lorazepam (Ativan -)  0.5 mg PO BID PRN


   PRN Reason: ANXIETY


   Last Admin: 10/18/19 22:36 Dose:  0.5 mg











- Objective


Vital Signs: 


 Vital Signs











Temperature  98.7 F   10/19/19 06:00


 


Pulse Rate  93 H  10/19/19 06:00


 


Respiratory Rate  20   10/19/19 06:00


 


Blood Pressure  128/66   10/19/19 06:00


 


O2 Sat by Pulse Oximetry (%)  97   10/18/19 21:00











Constitutional: Yes: No Distress, Calm


Eyes: Yes: Conjunctiva Clear


HENT: Yes: Atraumatic


Neck: Yes: Supple


Cardiovascular: Yes: Regular Rate and Rhythm


Respiratory: Yes: Diminished


Gastrointestinal: Yes: Soft.  No: Tenderness


Genitourinary: No: Hematuria


Musculoskeletal: No: Joint Stiffness, Joint Swelling


Extremities: No: Cold, Cool, Cyanosis


Edema: No


Integumentary: No: Rash, Venous Stasis Changes


Neurological: Yes: Alert, Confusion.  No: Oriented


...Motor Strength: WNL


Psychiatric: Yes: Alert.  No: Oriented, Agitated


Labs: 


 CBC, BMP





 10/19/19 06:15 





 10/19/19 06:15 





 INR, PTT











INR  1.03  (0.83-1.09)   10/17/19  16:16    














- ....Imaging


Cat Scan: Report Reviewed


Other: Report Reviewed





Assessment/Plan





87yo M with PMH of COPD (not on home O2), HTN, DM, asthma, metastatic bladder CA

, lung and liver masses, h/o long heavy tobacco use, advanced dementia, 

advanced COPD, history of respiratory arrest, who presents to the emergency 

department for failure to thrive brought by daughter; found to have tachycardia

, low grade fever 


sepsis? on IV ATB per ID


seen also by cardiology, heme onc 


pt's daughter signed DNR (but not DNI)


pt needs intermittent wrist restraints - pulled out IV, medical equipment


falls decubs aspiration pfx


d./w staff


close f/u; PT and CM eval on monday

## 2019-10-19 NOTE — PN
Progress Note (short form)





- Note


Progress Note: 





NAD





per rn has been sleeping most of the day


some cough


wife at bedside





 Vital Signs











 Period  Temp  Pulse  Resp  BP Sys/Ty  Pulse Ox


 


 Last 24 Hr  97.5 F-99.8 F    20-26  100-131/46-66  91-97








cor-rrr


lungs bilateral rhonchi


abd soft,nt


ext no edema





 CBC, BMP





 10/19/19 06:15 





 10/19/19 06:15 





 Microbiology





10/18/19 18:00   Sputum - Expectorated   Gram Stain - Final


10/18/19 16:30   Urine For Antigen Detection   Legionella Antigen - Final-

negative


10/18/19 16:30   Urine For Antigen Detection   Streptococcus pneumoniae Antigen 

(M - Final-negative


10/17/19 17:50   Urine - Urine - Catheterized   Urine Culture - Final


                            NO GROWTH OBTAINED





a/p


advanced malignancy


possible sepsis


f/u cultures


continue rocephin


d/c zithromax

## 2019-10-19 NOTE — PN
Progress Note (short form)





- Note


Progress Note: 





86 year with lethargy , male admitted, weakness, incontinent of bodily function 

and workup suggesting metastatic disease. Known case of CAD/andina, O2 

dependent COPD, tobacco abuse





Patient remains lethargic but arousable.


 


ALLERGIES:  None documented.





Active Medications





Aspirin (Ecotrin -)  81 mg PO DAILY [facility]


   Last Admin: 10/19/19 09:13 Dose:  81 mg


Sodium Chloride (Normal Saline -)  1,000 mls @ 42 mls/hr IV ASDIR [facility]


   Last Admin: 10/19/19 10:37 Dose:  42 mls/hr


Ceftriaxone Sodium 1 gm/ (Dextrose)  50 mls @ 200 mls/hr IVPB DAILY [facility]; 

Protocol


   Last Admin: 10/19/19 09:07 Dose:  200 mls/hr


Azithromycin 250 mg/ Dextrose  250 mls @ 250 mls/hr IVPB DAILY [facility]


   Last Admin: 10/19/19 10:37 Dose:  250 mls/hr


Insulin Aspart (Novolog Vial Sliding Scale -)  1 vial SQ BIDAC [facility]; Protocol


   Last Admin: 10/19/19 06:24 Dose:  Not Given


Lorazepam (Ativan -)  0.5 mg PO BID PRN


   PRN Reason: ANXIETY


   Last Admin: 10/18/19 22:36 Dose:  0.5 mg








PHYSICAL EXAMINATION:


General:  86-year-old gentleman was in no acute distress.  No pallor was 

appreciated.  No clubbing or jaundice.





 Vital Signs











Temperature  98.7 F   10/19/19 06:00


 


Pulse Rate  93 H  10/19/19 06:00


 


Respiratory Rate  20   10/19/19 06:00


 


Blood Pressure  128/66   10/19/19 06:00


 


O2 Sat by Pulse Oximetry (%)  97   10/18/19 21:00








 


Neck:  Supple.  No jugular venous distention.  Hepatojugular reflux was 

negative.  Carotids were 2+.  Upstrokes were normal.  No bruits were heard, and 

no thyromegaly was present.


Heart:  PMI was in the fifth intercostal space.  Heart sounds were distant.  

Unable to appreciate murmurs, gallops or rubs.


Lungs:  Decreased breath sounds bilaterally, fine crepitations at the left base.


Abdomen:  Soft and nontender.  Liver is 4 to 5 fingerbreadth below the right 

intercostal margin, is probable nodular and is nontender. No splenomegaly is 

appreciated.  Well-healed midline abdominal scar. No masses were felt.


Extremities:  No calf tenderness or dependent edema.  Femoral pulses were 2+.  

Dorsalis pedis and posterior tibial pulses were not palpable.





Chest x-ray Dated: 10/17/19


Impression: Interval atelectatic changes and probable infiltrates in the left 

lung base. 


 


CTA Chest: Dated: 10/17/19


Impression: See discussion above. There is no gross evidence of a pulmonary 

embolus in the main pulmonary artery and its proximal branches, bilaterally. 

Severe COPD changes again seen. Interval increase in size of previously 

visualized pleural-based nodule/mass in the medial aspect of the right lower 

lobe now measuring 2.5 x 1.3 cm. Interval multiple low-attenuation densities in 

included portion of the liver with the largest measuring 7.5 cm that are highly 

suspicious for metastasis. Heterogeneous attenuation and enhancement of the 

spleen that may be due to rapid intravenous contrast administration. However, 

metastatic lesions cannot be excluded. 








LAB DATA:





 Laboratory Tests











  10/19/19





  06:15


 


WBC  10.2 H


 


RBC  4.82


 


Hgb  14.2


 


Hct  43.3


 


MCV  89.8


 


MCH  29.3


 


MCHC  32.7


 


RDW  17.4 H


 


Plt Count  165


 


MPV  7.9

















  10/19/19





  06:15


 


Sodium  141


 


Potassium  4.4


 


Chloride  99


 


Carbon Dioxide  36 H


 


Anion Gap  6 L


 


BUN  33.2 H


 


Creatinine  0.6


 


Random Glucose  89


 


Calcium  9.1


 


Total Bilirubin  1.1 H


 


AST  135 H


 


ALT  86 H


 


Alkaline Phosphatase  870 H


 


Creatine Kinase  577 H


 


Creatine Kinase Index  1.0


 


CK-MB (CK-2)  5.9 H


 


Troponin I  0.52 H


 


Total Protein  7.0


 


Albumin  3.1 L

















IMPRESSION:


1.  Athrosclerotic heart disease/angina pectoris, suspect acute coronary 

syndrome.


2.  Abnormal liver function tests, hepatomegaly, and abdominal CT is highly 

suspicious for metastatic disease.


3.  Oxygen dependent COPD/chronic bronchitis.


4.  Lethargy and mental confusion etiology to be determined.


5.  Dehydration, corrected.


6.  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


7.  Elevated BNP most likely related to either advanced COPD, congestive heart 

failure and possibly related to advanced carcinoma.


8.  ? history of dementia.


9.  Tobacco abuse.





RECOMMENDATION:


1.   Consider topical nitrates till patient is fully awake and able to take 

fluid/liquids orally.


2.   Followup ECG and enzymes. 


3.   Wife and daughter were aware of his cardiac status and overall medical 

status.


4.   Further evaluation and workup is in progress.


 


 


 


GABRIEL JHAVERI M.D.

## 2019-10-19 NOTE — EKG
Test Reason : 

Blood Pressure : ***/*** mmHG

Vent. Rate : 104 BPM     Atrial Rate : 104 BPM

   P-R Int : 080 ms          QRS Dur : 122 ms

    QT Int : 384 ms       P-R-T Axes : 071 071 054 degrees

   QTc Int : 504 ms

 

SINUS TACHYCARDIA WITH SHORT MI

RIGHT BUNDLE BRANCH BLOCK

CANNOT RULE OUT INFERIOR INFARCT (CITED ON OR BEFORE 17-OCT-2019)

ABNORMAL ECG

WHEN COMPARED WITH ECG OF 17-OCT-2019 16:16,

MI INTERVAL HAS DECREASED

Confirmed by MD JASE, CRISTINA ([id number]) on 10/19/2019 8:15:25 PM

 

Referred By: SANDRA BELTRE           Confirmed By:CRISTINA LAGUNA MD

## 2019-10-20 LAB
ALBUMIN SERPL-MCNC: 3.1 G/DL (ref 3.4–5)
ALP SERPL-CCNC: 821 U/L (ref 45–117)
ALT SERPL-CCNC: 78 U/L (ref 13–61)
ANION GAP SERPL CALC-SCNC: 5 MMOL/L (ref 8–16)
AST SERPL-CCNC: 116 U/L (ref 15–37)
BASOPHILS # BLD: 0.3 % (ref 0–2)
BILIRUB SERPL-MCNC: 0.8 MG/DL (ref 0.2–1)
BUN SERPL-MCNC: 30.1 MG/DL (ref 7–18)
CALCIUM SERPL-MCNC: 9 MG/DL (ref 8.5–10.1)
CHLORIDE SERPL-SCNC: 101 MMOL/L (ref 98–107)
CO2 SERPL-SCNC: 34 MMOL/L (ref 21–32)
CREAT SERPL-MCNC: 0.5 MG/DL (ref 0.55–1.3)
DEPRECATED RDW RBC AUTO: 17.4 % (ref 11.9–15.9)
EOSINOPHIL # BLD: 0.1 % (ref 0–4.5)
GLUCOSE SERPL-MCNC: 89 MG/DL (ref 74–106)
HCT VFR BLD CALC: 42.8 % (ref 35.4–49)
HGB BLD-MCNC: 13.9 GM/DL (ref 11.7–16.9)
LYMPHOCYTES # BLD: 10.3 % (ref 8–40)
MCH RBC QN AUTO: 29.3 PG (ref 25.7–33.7)
MCHC RBC AUTO-ENTMCNC: 32.5 G/DL (ref 32–35.9)
MCV RBC: 90.3 FL (ref 80–96)
MONOCYTES # BLD AUTO: 9.3 % (ref 3.8–10.2)
NEUTROPHILS # BLD: 80 % (ref 42.8–82.8)
PLATELET # BLD AUTO: 149 K/MM3 (ref 134–434)
PMV BLD: 7.7 FL (ref 7.5–11.1)
POTASSIUM SERPLBLD-SCNC: 4.6 MMOL/L (ref 3.5–5.1)
PROT SERPL-MCNC: 7.2 G/DL (ref 6.4–8.2)
RBC # BLD AUTO: 4.74 M/MM3 (ref 4–5.6)
SODIUM SERPL-SCNC: 140 MMOL/L (ref 136–145)
WBC # BLD AUTO: 7.6 K/MM3 (ref 4–10)

## 2019-10-20 RX ADMIN — CEFTRIAXONE SCH MLS/HR: 1 INJECTION, POWDER, FOR SOLUTION INTRAMUSCULAR; INTRAVENOUS at 09:14

## 2019-10-20 RX ADMIN — INSULIN ASPART SCH: 100 INJECTION, SOLUTION INTRAVENOUS; SUBCUTANEOUS at 06:36

## 2019-10-20 RX ADMIN — INSULIN ASPART SCH: 100 INJECTION, SOLUTION INTRAVENOUS; SUBCUTANEOUS at 17:25

## 2019-10-20 RX ADMIN — SODIUM CHLORIDE SCH MLS/HR: 9 INJECTION, SOLUTION INTRAVENOUS at 06:28

## 2019-10-20 RX ADMIN — ASPIRIN SCH MG: 81 TABLET, COATED ORAL at 09:14

## 2019-10-20 NOTE — CONS
DATE OF CONSULTATION:  

 

DATE OF DICTATION:  10/20/2019

 

The patient is an 86-year-old [place] gentleman who was admitted with progressive

lethargy, weakness, progressive weight loss, incontinence of bodily functions, was

found to have lesions in the liver suggestive of metastatic disease.  

 

He has long-standing history of oxygen-dependent COPD, coronary artery disease,

angina pectoris, tobacco abuse, hypertension, hypertensive cardiovascular disease,

and hypercholesterolemia.  According to his daughter, there is history of dementia.

 

Patient remains lethargic but is arousable.  He is confused.  No other pertinent

symptoms have been reported.  

 

MEDICATIONS:  

1.  Ceftriaxone IV piggyback.

2.  Ativan 0.5 mg p.o. t.i.d. p.r.n. 

3.  He is receiving normal saline 42 mL per hour.

4.  NovoLog insulin via sliding scale.

5.  Aspirin 81 mg p.o. daily.

 

EXAMINATION:

General:  An 86-year-old cachectic male, was poorly responsive.

Vital Signs:  Blood pressure 140/55 mmHg.  Pulse 105 beats per minute and irregular. 

Temperature 97.8 degrees Fahrenheit.  Respirations 20 per minute.  Oxygen saturation

96% on 2 L of oxygen.

Neck:  Supple.  No jugular venous distention.  Hepatojugular reflux was negative. 

Carotids were 2+.  No bruits were appreciated.  No thyromegaly.

Heart:  PMI was in the 5th intercostal space.  No heaves or thrills.  Heart sounds

were distant.  No murmur or gallops were appreciated.

Lungs:  Bilaterally decreased breath sounds, more pronounced at the bases.  No

extraneous sounds were appreciated.

Abdomen:  Soft, was 4 to 5 fingerbreadths below the right costal margin.  No

splenomegaly was appreciated.  No palpable masses were felt.

Extremities:  No calf tenderness or dependent edema.

 

LABORATORY DATA:  CBC, October 20, 2019:  WBC 7600, hemoglobin 13.9 g/dL, hematocrit

42.8%, platelet count was 149,000, differential was normal.  

 

Chemistry:  Sodium 140, potassium 4.6, chloride 101, CO2 34 mmol/L, BUN 30.1,

creatinine 0.5 mg dL.

 

Liver function tests:  Total bilirubin 0.8, , ALT 78, alkaline phosphatase 821

units/L.

 

IMPRESSION:

1.  Coronary artery disease, angina pectoris, with recent elevation of troponin

level, suggestive of coronary syndrome.  

2.  Lethargy, poor mentation, etiology to be determined.

3.  Hepatomegaly, most likely related to metastatic disease.

4.  Oxygen-dependent chronic obstructive pulmonary disease.

5.  Acute pulmonary infection is being considered.

 

RECOMMENDATIONS:

1.  Continue current medications.

2.  Follow up CBC and BMP.

3.  Follow up ECG and troponin levels.

 

PROGNOSIS:  Critical.

 

 

 

GALE EDMONDSON/[id number]

DD: 10/20/2019 15:10

DT: 10/20/2019 17:22

Job #:  [id number]

## 2019-10-20 NOTE — PN
Progress Note, Physician


Chief Complaint: 





received ativan early this am he was agitated wanted to get OOB and pulled in 

IVs now sleeping, calm; VSS 





- Current Medication List


Current Medications: 


Active Medications





Aspirin (Ecotrin -)  81 mg PO DAILY [facility]


   Last Admin: 10/20/19 09:14 Dose:  81 mg


Sodium Chloride (Normal Saline -)  1,000 mls @ 42 mls/hr IV ASDIR [facility]


   Last Admin: 10/20/19 06:28 Dose:  42 mls/hr


Ceftriaxone Sodium 1 gm/ (Dextrose)  50 mls @ 200 mls/hr IVPB DAILY [facility]; 

Protocol


   Last Admin: 10/20/19 09:14 Dose:  200 mls/hr


Insulin Aspart (Novolog Vial Sliding Scale -)  1 vial SQ BIDAC [facility]; Protocol


   Last Admin: 10/20/19 06:36 Dose:  Not Given


Lorazepam (Ativan -)  0.5 mg PO BID PRN


   PRN Reason: ANXIETY


   Last Admin: 10/20/19 08:31 Dose:  0.5 mg











- Objective


Vital Signs: 


 Vital Signs











Temperature  98.4 F   10/20/19 08:21


 


Pulse Rate  105 H  10/20/19 08:21


 


Respiratory Rate  19   10/20/19 08:21


 


Blood Pressure  127/59 L  10/20/19 08:21


 


O2 Sat by Pulse Oximetry (%)  93 L  10/19/19 21:00











Constitutional: Yes: No Distress, Calm


Eyes: Yes: Conjunctiva Clear


HENT: Yes: Atraumatic


Neck: Yes: Supple


Cardiovascular: Yes: Regular Rate and Rhythm


Respiratory: Yes: Diminished


Gastrointestinal: Yes: Soft.  No: Tenderness


Genitourinary: No: Hematuria


Musculoskeletal: No: Joint Stiffness, Joint Swelling


Extremities: No: Cold, Cool


Edema: No


Integumentary: No: Rash, Venous Stasis Changes


Psychiatric: No: Agitated


Labs: 


 CBC, BMP





 10/20/19 05:45 





 10/20/19 05:45 





 INR, PTT











INR  1.03  (0.83-1.09)   10/17/19  16:16    














- ....Imaging


Other: Report Reviewed





Assessment/Plan





85yo M with PMH of COPD (not on home O2), HTN, DM, asthma, metastatic bladder CA

, lung and liver masses, h/o long heavy tobacco use, advanced dementia, 

advanced COPD, history of respiratory arrest, admitted for failure to thrive 

brought by daughter; found to have tachycardia, low grade fever 


sepsis? on IV ATB per ID


seen also by cardiology, heme onc 


pt's daughter signed DNR (but not DNI)


pt needs intermittent wrist restraints - pulled out IV, medical equipment


falls decubs aspiration pfx


d./w staff


close f/u; PT and CM eval on monday

## 2019-10-21 RX ADMIN — CEFUROXIME AXETIL SCH: 250 TABLET, FILM COATED ORAL at 13:29

## 2019-10-21 RX ADMIN — SODIUM CHLORIDE SCH MLS/HR: 9 INJECTION, SOLUTION INTRAVENOUS at 12:56

## 2019-10-21 RX ADMIN — CEFUROXIME AXETIL SCH MG: 250 TABLET, FILM COATED ORAL at 22:16

## 2019-10-21 RX ADMIN — ASPIRIN SCH MG: 81 TABLET, COATED ORAL at 10:46

## 2019-10-21 RX ADMIN — INSULIN ASPART SCH: 100 INJECTION, SOLUTION INTRAVENOUS; SUBCUTANEOUS at 06:08

## 2019-10-21 RX ADMIN — SODIUM CHLORIDE SCH: 9 INJECTION, SOLUTION INTRAVENOUS at 06:07

## 2019-10-21 RX ADMIN — IPRATROPIUM BROMIDE AND ALBUTEROL SULFATE PRN AMP: .5; 3 SOLUTION RESPIRATORY (INHALATION) at 20:14

## 2019-10-21 RX ADMIN — INSULIN ASPART SCH UNITS: 100 INJECTION, SOLUTION INTRAVENOUS; SUBCUTANEOUS at 17:40

## 2019-10-21 RX ADMIN — CEFTRIAXONE SCH MLS/HR: 1 INJECTION, POWDER, FOR SOLUTION INTRAMUSCULAR; INTRAVENOUS at 10:46

## 2019-10-21 RX ADMIN — CEFUROXIME AXETIL SCH: 250 TABLET, FILM COATED ORAL at 22:33

## 2019-10-21 NOTE — PN
Progress Note, Physician


History of Present Illness: 





AWAKE WITH EYES OPEN BUT NOT VERBALLY RESPONSIVE


AFEBRILE


WBC  IMPROVED   WNL


BREATHING NON LABORED


NO COUGH NOTED


BC (-)   URINE C/S (-)


LEGIONELLA AG (-)


SPUTUM NORMAL NATHALIA





- Current Medication List


Current Medications: 


Active Medications





Aspirin (Ecotrin -)  81 mg PO DAILY SAM


   Last Admin: 10/20/19 09:14 Dose:  81 mg


Sodium Chloride (Normal Saline -)  1,000 mls @ 42 mls/hr IV ASDIR SAM


   Last Admin: 10/21/19 06:07 Dose:  Not Given


Ceftriaxone Sodium 1 gm/ (Dextrose)  50 mls @ 200 mls/hr IVPB DAILY [facility]; 

Protocol


   Last Admin: 10/20/19 09:14 Dose:  200 mls/hr


Insulin Aspart (Novolog Vial Sliding Scale -)  1 vial SQ BIDAC [facility]; Protocol


   Last Admin: 10/21/19 06:08 Dose:  Not Given


Lorazepam (Ativan -)  0.5 mg PO BID PRN


   PRN Reason: ANXIETY


   Last Admin: 10/20/19 17:26 Dose:  0.5 mg











- Objective


Vital Signs: 


 Vital Signs











Temperature  97.9 F   10/21/19 05:35


 


Pulse Rate  97 H  10/21/19 05:35


 


Respiratory Rate  16   10/21/19 05:35


 


Blood Pressure  130/64   10/21/19 05:35


 


O2 Sat by Pulse Oximetry (%)  96   10/20/19 21:00











Constitutional: Yes: No Distress, Cachectic


Eyes: Yes: Conjunctiva Clear


Cardiovascular: Yes: Regular Rate and Rhythm, S1, S2


Respiratory: Yes: Diminished


Gastrointestinal: Yes: Normal Bowel Sounds, Soft.  No: Tenderness


Edema: No


Labs: 


 CBC, BMP





 10/20/19 05:45 





 10/20/19 05:45 





 INR, PTT











INR  1.03  (0.83-1.09)   10/17/19  16:16    














Assessment/Plan





PROBABLE METASTATIC CA


CACHEXIA


? PNEUMONIA  ? UTI


DAY#5  CEFTRIAXONE


SUBSTITUTE CEFTIN PO X 48HR


ASP PRECAUTIONS 


SUPPORTIVE MEASURES

## 2019-10-21 NOTE — PN
Progress Note, Physician


Chief Complaint: 





awake alert son at bedside 





- Current Medication List


Current Medications: 


Active Medications





Aspirin (Ecotrin -)  81 mg PO DAILY SAM


   Last Admin: 10/20/19 09:14 Dose:  81 mg


Sodium Chloride (Normal Saline -)  1,000 mls @ 42 mls/hr IV ASDIR [facility]


   Last Admin: 10/21/19 06:07 Dose:  Not Given


Ceftriaxone Sodium 1 gm/ (Dextrose)  50 mls @ 200 mls/hr IVPB DAILY [facility]; 

Protocol


   Last Admin: 10/20/19 09:14 Dose:  200 mls/hr


Insulin Aspart (Novolog Vial Sliding Scale -)  1 vial SQ BIDAC [facility]; Protocol


   Last Admin: 10/21/19 06:08 Dose:  Not Given


Lorazepam (Ativan -)  0.5 mg PO BID PRN


   PRN Reason: ANXIETY


   Last Admin: 10/20/19 17:26 Dose:  0.5 mg











- Objective


Vital Signs: 


 Vital Signs











Temperature  97.9 F   10/21/19 05:35


 


Pulse Rate  97 H  10/21/19 05:35


 


Respiratory Rate  16   10/21/19 05:35


 


Blood Pressure  130/64   10/21/19 05:35


 


O2 Sat by Pulse Oximetry (%)  96   10/20/19 21:00











Constitutional: Yes: No Distress, Calm


Eyes: Yes: Conjunctiva Clear


HENT: Yes: Atraumatic


Neck: Yes: Supple


Cardiovascular: Yes: Regular Rate and Rhythm


Respiratory: Yes: Diminished


Gastrointestinal: Yes: Soft


Genitourinary: No: Hematuria


Musculoskeletal: No: Joint Stiffness, Joint Swelling


Extremities: No: Cold, Cool


Edema: No


Integumentary: No: Rash, Venous Stasis Changes


Neurological: Yes: WNL, Alert, Oriented


...Motor Strength: WNL


Psychiatric: Yes: WNL, Alert, Oriented.  No: Agitated, Suicidal Ideation


Labs: 


 CBC, BMP





 10/20/19 05:45 





 10/20/19 05:45 





 INR, PTT











INR  1.03  (0.83-1.09)   10/17/19  16:16    














- ....Imaging


Other: Report Reviewed





Assessment/Plan





87yo M with PMH of COPD (not on home O2), HTN, DM, asthma, metastatic bladder CA

, lung and liver masses, h/o long heavy tobacco use, advanced dementia, 

advanced COPD, history of respiratory arrest, admitted for failure to thrive; 

found to have tachycardia, low grade fever 


sepsis? on IV ATB per ID


seen also by cardiology, heme onc 


pt's daughter signed DNR (but not DNI)


falls decubs aspiration pfx


d./w staff and son plan for SNF

## 2019-10-21 NOTE — PN
Progress Note (short form)





- Note


Progress Note: 





86-year-old gentleman was brought to the emergency room by his daughter because 

of progressive lethargy, weakness, progressive weight loss and incontinence of 

both stool and urine.  Patient has longstanding history of coronary artery 

disease, angina pectoris, chronic bronchitis/COPD, O2 dependent, hypertension, 

hypertensive cardiovascular disease, tobacco abuse.


 


According to his daughter he has developed progressive lethargy and generalized 

weakness, apparently his appetite has been poor he also has been losing control 

over his bodily functions and has lost a significant amount of weight. No 

history of chest pain or discomfort is available, has chronic dyspnea and has a 

chronic cough. No history of expectoration and hemoptysis is available.  

Patient is poorly responsive and unable to provide a history.





 


ALLERGIES:  None documented.








Active Medications











Generic Name Dose Route Start Last Admin





  Trade Name Freq  PRN Reason Stop Dose Admin


 


Aspirin  81 mg  10/18/19 10:00  10/20/19 09:14





  Ecotrin -  PO   81 mg





  DAILY SAM   Administration





     





     





     





     


 


Sodium Chloride  1,000 mls @ 42 mls/hr  10/18/19 00:45  10/21/19 06:07





  Normal Saline -  IV   Not Given





  ASDIR [facility]   





     





     





     





     


 


Ceftriaxone Sodium 1 gm/  50 mls @ 200 mls/hr  10/18/19 15:30  10/20/19 09:14





  Dextrose  IVPB   200 mls/hr





  DAILY [facility]   Administration





     





     





  Protocol   





     


 


Insulin Aspart  1 vial  10/18/19 07:00  10/21/19 06:08





  Novolog Vial Sliding Scale -  SQ   Not Given





  BIDAC [facility]   





     





     





  Protocol   





     


 


Lorazepam  0.5 mg  10/18/19 22:27  10/20/19 17:26





  Ativan -  PO   0.5 mg





  BID PRN   Administration





  ANXIETY   





     





     





     








 


PHYSICAL EXAMINATION:


General:  86-year-old gentleman was in no acute distress.  No pallor was 

appreciated.  No clubbing or jaundice.





 Vital Signs











Temperature  97.9 F   10/21/19 05:35


 


Pulse Rate  97 H  10/21/19 05:35


 


Respiratory Rate  16   10/21/19 05:35


 


Blood Pressure  130/64   10/21/19 05:35


 


O2 Sat by Pulse Oximetry (%)  96   10/20/19 21:00








Neck:  Supple.  No jugular venous distention.  Hepatojugular reflux was 

negative.  Carotids were 2+.  Upstrokes were normal.  No bruits were heard, and 

no thyromegaly was present.


Heart:  PMI was in the fifth intercostal space.  Heart sounds were distant.  

Unable to appreciate murmurs, gallops or rubs.


Lungs:  Decreased breath sounds bilaterally, fine crepitations at the left base.


Abdomen:  Soft and nontender.  Liver is 4 to 5 fingerbreadth below the right 

intercostal margin, is probable nodular and is nontender. No splenomegaly is 

appreciated.  Well-healed midline abdominal scar. No masses were felt.


Extremities:  No calf tenderness or dependent edema.  Femoral pulses were 2+.  

Dorsalis pedis and posterior tibial pulses were not palpable.





ECG: Dated: 10/17/19


NORMAL SINUS RHYTHM


RIGHT ATRIAL ENLARGEMENT


RIGHT BUNDLE BRANCH BLOCK


CANNOT RULE OUT INFERIOR INFARCT , AGE UNDETERMINED


ABNORMAL ECG


WHEN COMPARED WITH ECG OF 28-FEB-2018 19:07,


RIGHT BUNDLE BRANCH BLOCK HAS REPLACED NON-SPECIFIC INTRA-VENTRICULAR


CONDUCTION BLOCK


MINIMAL CRITERIA FOR INFERIOR INFARCT ARE NOW PRESENT





Chest x-ray Dated: 10/17/19


Impression: Interval atelectatic changes and probable infiltrates in the left 

lung base. 


 


CTA Chest: Dated: 10/17/19


Impression: See discussion above. There is no gross evidence of a pulmonary 

embolus in the main pulmonary artery and its proximal branches, bilaterally. 

Severe COPD changes again seen. Interval increase in size of previously 

visualized pleural-based nodule/mass in the medial aspect of the right lower 

lobe now measuring 2.5 x 1.3 cm. Interval multiple low-attenuation densities in 

included portion of the liver with the largest measuring 7.5 cm that are highly 

suspicious for metastasis. Heterogeneous attenuation and enhancement of the 

spleen that may be due to rapid intravenous contrast administration. However, 

metastatic lesions cannot be excluded. 








LAB DATA:





 CBC, BMP





 10/20/19 05:45 





 10/20/19 05:45 











IMPRESSION:


1.  Athrosclerotic heart disease/angina pectoris, suspect acute coronary 

syndrome.


2.  Abnormal liver function test, hepatomegaly, and abdominal CT is highly 

suspicious for metastatic disease.


3.  Oxygen dependent COPD/chronic bronchitis.


4.  Tobacco abuse.


5.  Dehydration, corrected.


6.  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


7.  Elevated BNP most likely related to either advanced COPD, congestive heart 

failure and possibly related to advanced carcinoma.


8.  ? history of dementia.








RECOMMENDATION:


1.   As patient is unable to take oral medications because of lethargy etc. 

consider placing him on topical nitrates.


2.   Followup ECG and enzymes. 


3.   Wife and daughter were aware of his cardiac status and overall medical 

status.


4.   Further evaluation and workup is in progress.


 


PROGNOSIS: Critical.


 





GABRIEL JHAVERI M.D.


Face-to-face time spent with patient, evaluating hospital records and with 

family 45 minutes.

## 2019-10-22 RX ADMIN — CEFUROXIME AXETIL SCH MG: 250 TABLET, FILM COATED ORAL at 11:23

## 2019-10-22 RX ADMIN — INSULIN ASPART SCH: 100 INJECTION, SOLUTION INTRAVENOUS; SUBCUTANEOUS at 06:06

## 2019-10-22 RX ADMIN — IPRATROPIUM BROMIDE AND ALBUTEROL SULFATE PRN AMP: .5; 3 SOLUTION RESPIRATORY (INHALATION) at 19:35

## 2019-10-22 RX ADMIN — INSULIN ASPART SCH: 100 INJECTION, SOLUTION INTRAVENOUS; SUBCUTANEOUS at 17:06

## 2019-10-22 RX ADMIN — SODIUM CHLORIDE SCH MLS/HR: 9 INJECTION, SOLUTION INTRAVENOUS at 05:29

## 2019-10-22 RX ADMIN — CEFUROXIME AXETIL SCH MG: 250 TABLET, FILM COATED ORAL at 21:41

## 2019-10-22 RX ADMIN — ASPIRIN SCH MG: 81 TABLET, COATED ORAL at 11:23

## 2019-10-22 NOTE — CON.PSY
Psychiatry Consult


Chief Complaint: 86 Kady old male with Dementia and Ca seen for Psych 

evaluation for Un Cooperative behaviour. Patient has been pulling out amsk for 

oxygen and being uncopertaive with care, Case  discussed with Dr. Javier.


Symptoms: reports: Aggressivity, Impulsivity





- Previous Psychiatric Treatment


Outpatient: None


Inpatient: None





- Previous Substance Abuse Treatment


Outpatient: None


Inpatient: None





- Current Medications


Current Medications: 


Active Medications





Albuterol/Ipratropium (Duoneb -)  1 amp NEB Q6H PRN


   PRN Reason: SHORTNESS OF BREATH


   Last Admin: 10/21/19 20:14 Dose:  1 amp


Aspirin (Ecotrin -)  81 mg PO DAILY [facility]


   Last Admin: 10/22/19 11:23 Dose:  81 mg


Cefuroxime Axetil (Ceftin -)  250 mg PO BID [facility]


   Last Admin: 10/22/19 11:23 Dose:  250 mg


Sodium Chloride (Normal Saline -)  1,000 mls @ 42 mls/hr IV ASDIR [facility]


   Last Admin: 10/22/19 05:29 Dose:  42 mls/hr


Insulin Aspart (Novolog Vial Sliding Scale -)  1 vial SQ BIDAC [facility]; Protocol


   Last Admin: 10/22/19 17:06 Dose:  Not Given


Lorazepam (Ativan -)  0.5 mg PO BID PRN


   PRN Reason: ANXIETY


   Last Admin: 10/22/19 05:29 Dose:  0.5 mg











- Allergies


Allergies: 


 Allergies











Allergy/AdvReac Type Severity Reaction Status Date / Time


 


No Known Allergies Allergy   Verified 18 15:33














- Current Living Status


Usual Living Arrangement: Nursing Home





- Current Mental Status Evaluation


Appearance: Disheveled


Attitude: Guarded





- Affect


Appropriateness: Not Appropriate





- Mood


Mood: Irritable





- Speech/Language


Expressive: Delayed





- Psychomotor Activity


Psychomotor Activity: Hyperactive





- Thought Process


Thought Process: Circumstantial





- Thought Content


Hallucinations: Absent


Delusions: Absent





- Self Perception


Self Perception: Depersonalization





- Cognition


Attention: Diminished


Orientation: Time


Memory, Immediate Recall: Impaired


Memory, Short Term: 1/3


Memory, Remote with Promptin/3





- Concentration


Serial Sevens Intact: No


Simple Calculations Intact: No





- Abstraction


Proverb Interpretation: [place]


Judgement: Minimally Impaired





- Insight


Insight: Impaired





- Impulse Control


Impulse Control: Moderately Impaired





- Suicidal Ideation


Suicidal Ideation: No





- Homicidal Ideation


Homicidal Ideation: No





Assessment/Plan





1) zyprexa 5mg po hs for agitation and poor appetite.

## 2019-10-22 NOTE — PN
Progress Note (short form)





- Note


Progress Note: 





86 year with lethargy , male admitted, weakness, incontinent of bodily function 

and workup suggesting metastatic disease. Known case of CAD/andina, O2 

dependent COPD, tobacco abuse





Patient remains lethargic but arousable. No significant change in clinical 

status.


 


ALLERGIES:  None documented.





Active Medications











Generic Name Dose Route Start Last Admin





  Trade Name Freq  PRN Reason Stop Dose Admin


 


Albuterol/Ipratropium  1 amp  10/21/19 11:21  10/21/19 20:14





  Duoneb -  NEB   1 amp





  Q6H PRN   Administration





  SHORTNESS OF BREATH   





     





     





     


 


Aspirin  81 mg  10/18/19 10:00  10/21/19 10:46





  Ecotrin -  PO   81 mg





  DAILY SAM   Administration





     





     





     





     


 


Cefuroxime Axetil  250 mg  10/21/19 11:00  10/21/19 22:33





  Ceftin -  PO   Not Given





  BID SAM   





     





     





     





     


 


Sodium Chloride  1,000 mls @ 42 mls/hr  10/18/19 00:45  10/22/19 05:29





  Normal Saline -  IV   42 mls/hr





  ASDIR SAM   Administration





     





     





     





     


 


Insulin Aspart  1 vial  10/18/19 07:00  10/22/19 06:06





  Novolog Vial Sliding Scale -  SQ   Not Given





  BIDAC SAM   





     





     





  Protocol   





     


 


Lorazepam  0.5 mg  10/18/19 22:27  10/22/19 05:29





  Ativan -  PO   0.5 mg





  BID PRN   Administration





  ANXIETY   





     





     





     














PHYSICAL EXAMINATION:


General:  86-year-old gentleman was in no acute distress.  No pallor was 

appreciated.  No clubbing or jaundice.





 Last Vital Signs











Temp Pulse Resp BP Pulse Ox


 


 98.0 F   107 H  20   136/69   92 L


 


 10/22/19 07:17  10/22/19 07:17  10/22/19 07:17  10/22/19 07:17  10/21/19 21:00











 


Neck:  Supple.  No jugular venous distention.  Hepatojugular reflux was 

negative.  Carotids were 2+.  Upstrokes were normal.  No bruits were heard, and 

no thyromegaly was present.


Heart:  PMI was in the fifth intercostal space.  Heart sounds were distant.  

Unable to appreciate murmurs, gallops or rubs.


Lungs:  Decreased breath sounds bilaterally, fine crepitations at the left base.


Abdomen:  Soft and nontender.  Liver is 4 to 5 fingerbreadth below the right 

intercostal margin, is probable nodular and is nontender. No splenomegaly is 

appreciated.  Well-healed midline abdominal scar. No masses were felt.


Extremities:  No calf tenderness or dependent edema.  Femoral pulses were 2+.  

Dorsalis pedis and posterior tibial pulses were not palpable.





Chest x-ray Dated: 10/17/19


Impression: Interval atelectatic changes and probable infiltrates in the left 

lung base. 


 


CTA Chest: Dated: 10/17/19


Impression: See discussion above. There is no gross evidence of a pulmonary 

embolus in the main pulmonary artery and its proximal branches, bilaterally. 

Severe COPD changes again seen. Interval increase in size of previously 

visualized pleural-based nodule/mass in the medial aspect of the right lower 

lobe now measuring 2.5 x 1.3 cm. Interval multiple low-attenuation densities in 

included portion of the liver with the largest measuring 7.5 cm that are highly 

suspicious for metastasis. Heterogeneous attenuation and enhancement of the 

spleen that may be due to rapid intravenous contrast administration. However, 

metastatic lesions cannot be excluded. 








LAB DATA:


 CBC, BMP





 10/20/19 05:45 





 10/20/19 05:45 








IMPRESSION:


1.  Athrosclerotic heart disease/angina pectoris, suspect acute coronary 

syndrome.


2.  Abnormal liver function tests, hepatomegaly, and abdominal CT is highly 

suspicious for metastatic disease.


3.  Oxygen dependent COPD/chronic bronchitis.


4.  Lethargy and mental confusion etiology to be determined.


5.  Dehydration, corrected.


6.  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


7.  History of dementia.


9.  Tobacco abuse.





RECOMMENDATION:


1.  Consider resuming his cardiac medications..


2.  Supportive care.


3.  May need to consider biopsy of a hepatic nodule.





 


 


 


GABRIEL JHAVERI M.D.

## 2019-10-22 NOTE — PN
Progress Note, Physician


History of Present Illness: 





Unable to obtain as pt is not answering to questions, he is awake, said "come 

here" and held my hand.


Pt's nursing assistant at bedside, pt is refusing to eat or drink.





- Current Medication List


Current Medications: 


Active Medications





Albuterol/Ipratropium (Duoneb -)  1 amp NEB Q6H PRN


   PRN Reason: SHORTNESS OF BREATH


   Last Admin: 10/21/19 20:14 Dose:  1 amp


Aspirin (Ecotrin -)  81 mg PO DAILY [facility]


   Last Admin: 10/22/19 11:23 Dose:  81 mg


Cefuroxime Axetil (Ceftin -)  250 mg PO BID [facility]


   Last Admin: 10/22/19 11:23 Dose:  250 mg


Sodium Chloride (Normal Saline -)  1,000 mls @ 42 mls/hr IV ASDIR [facility]


   Last Admin: 10/22/19 05:29 Dose:  42 mls/hr


Insulin Aspart (Novolog Vial Sliding Scale -)  1 vial SQ BIDAC [facility]; Protocol


   Last Admin: 10/22/19 06:06 Dose:  Not Given


Lorazepam (Ativan -)  0.5 mg PO BID PRN


   PRN Reason: ANXIETY


   Last Admin: 10/22/19 05:29 Dose:  0.5 mg











- Objective


Vital Signs: 


 Vital Signs











Temperature  98.0 F   10/22/19 07:17


 


Pulse Rate  107 H  10/22/19 07:17


 


Respiratory Rate  20   10/22/19 07:17


 


Blood Pressure  136/69   10/22/19 07:17


 


O2 Sat by Pulse Oximetry (%)  92 L  10/21/19 21:00











Constitutional: Yes: No Distress, Calm


Cardiovascular: Yes: Regular Rate and Rhythm, S1, S2


Respiratory: Yes: Regular, Rhonchi, Wheezes


Gastrointestinal: Yes: Normal Bowel Sounds, Soft.  No: Tenderness


Edema: No


Neurological: Yes: Alert


Labs: 


 CBC, BMP





 10/20/19 05:45 





 10/20/19 05:45 





 INR, PTT











INR  1.03  (0.83-1.09)   10/17/19  16:16    














Problem List





- Problems


(1) Cardiac enzymes elevated


Code(s): R74.8 - ABNORMAL LEVELS OF OTHER SERUM ENZYMES   





(2) Failure to thrive in adult


Code(s): R62.7 - ADULT FAILURE TO THRIVE   





(3) Liver metastasis


Code(s): C78.7 - SECONDARY MALIG NEOPLASM OF LIVER AND INTRAHEPATIC BILE DUCT   





(4) CAD (coronary artery disease)


Code(s): I25.10 - ATHSCL HEART DISEASE OF NATIVE CORONARY ARTERY W/O ANG PCTRS 

  





(5) UTI (urinary tract infection)


Code(s): N39.0 - URINARY TRACT INFECTION, SITE NOT SPECIFIED   


Qualifiers: 


   Urinary tract infection type: site unspecified   Hematuria presence: without 

hematuria   Qualified Code(s): N39.0 - Urinary tract infection, site not 

specified   





(6) COPD (chronic obstructive pulmonary disease)


Code(s): J44.9 - CHRONIC OBSTRUCTIVE PULMONARY DISEASE, UNSPECIFIED   





(7) PUD (peptic ulcer disease)


Code(s): K27.9 - PEPTIC ULC, SITE UNSP, UNSP AS AC OR CHR, W/O HEMOR OR PERF   





(8) GI (gastrointestinal bleed)


Code(s): K92.2 - GASTROINTESTINAL HEMORRHAGE, UNSPECIFIED   





(9) Diabetes mellitus


Code(s): E11.9 - TYPE 2 DIABETES MELLITUS WITHOUT COMPLICATIONS   





(10) Bladder cancer


Code(s): C67.9 - MALIGNANT NEOPLASM OF BLADDER, UNSPECIFIED   





(11) Agitation


Assessment/Plan: 


Pt is removing oxygen NC/ mask, removing heart monitor.


It requires hand restrain


To request psychiatry consult


Code(s): R45.1 - RESTLESSNESS AND AGITATION   





Assessment/Plan





Admitted to Telemetry; to f/u with Cardio.


I called and talked with both daughters (HCPs), reviewed pt's condition, 

including poor PO intake of food and fluids; daughter ,Terrie,  states that her 

father eat well yesterday when family fed him; I encourage to call nursing 

assistant and nurse when family is feeding patient. I made them aware of 

Oncologist specialist opinion, patient's poor prognosis.


Prognosis: poor.


Pt is DNR.


Psychiatry consult.


DC planning to NH, both daughter agreed.


AM labs.


Pt's condition was reviewed with pt's nurse

## 2019-10-23 RX ADMIN — SODIUM CHLORIDE SCH MLS/HR: 9 INJECTION, SOLUTION INTRAVENOUS at 06:40

## 2019-10-23 RX ADMIN — NICOTINE SCH MG: 21 PATCH TRANSDERMAL at 14:31

## 2019-10-23 RX ADMIN — SODIUM CHLORIDE SCH: 9 INJECTION, SOLUTION INTRAVENOUS at 01:13

## 2019-10-23 RX ADMIN — INSULIN ASPART SCH: 100 INJECTION, SOLUTION INTRAVENOUS; SUBCUTANEOUS at 06:41

## 2019-10-23 RX ADMIN — INSULIN ASPART SCH: 100 INJECTION, SOLUTION INTRAVENOUS; SUBCUTANEOUS at 18:12

## 2019-10-23 RX ADMIN — ASPIRIN SCH MG: 81 TABLET, COATED ORAL at 11:33

## 2019-10-23 RX ADMIN — CEFUROXIME AXETIL SCH MG: 250 TABLET, FILM COATED ORAL at 11:33

## 2019-10-23 RX ADMIN — CEFUROXIME AXETIL SCH MG: 250 TABLET, FILM COATED ORAL at 21:46

## 2019-10-23 NOTE — PN
Progress Note (short form)





- Note


Progress Note: 





86 year with lethargy , male admitted, weakness, incontinent of bodily function 

and workup suggesting metastatic disease. Known case of CAD/andina, O2 

dependent COPD, tobacco abuse





 No significant change in clinical status. Periods of agitation. Spoke to 

daughter and is to transfered to a SNF.


 


ALLERGIES:  None documented.





Active Medications





Albuterol/Ipratropium (Duoneb -)  1 amp NEB Q6H PRN


   PRN Reason: SHORTNESS OF BREATH


   Last Admin: 10/22/19 19:35 Dose:  1 amp


Aspirin (Ecotrin -)  81 mg PO DAILY [facility]


   Last Admin: 10/23/19 11:33 Dose:  81 mg


Cefuroxime Axetil (Ceftin -)  250 mg PO BID [facility]


   Last Admin: 10/23/19 11:33 Dose:  250 mg


Sodium Chloride (Normal Saline -)  1,000 mls @ 42 mls/hr IV ASDIR [facility]


   Last Admin: 10/23/19 06:40 Dose:  42 mls/hr


Insulin Aspart (Novolog Vial Sliding Scale -)  1 vial SQ BIDAC [facility]; Protocol


   Last Admin: 10/23/19 06:41 Dose:  Not Given


Lorazepam (Ativan -)  0.5 mg PO BID PRN


   PRN Reason: ANXIETY


   Last Admin: 10/22/19 21:41 Dose:  0.5 mg


Olanzapine (Zyprexa -)  5 mg PO HS [facility]


   Last Admin: 10/22/19 21:41 Dose:  5 mg














PHYSICAL EXAMINATION:


General:  86-year-old gentleman was in no acute distress.  No pallor was 

appreciated.  No clubbing or jaundice.





 Last Vital Signs











Temp Pulse Resp BP Pulse Ox


 


 97.6 F   101 H  18   135/59 L  94 L


 


 10/23/19 10:05  10/23/19 10:05  10/23/19 10:05  10/23/19 10:05  10/23/19 09:00











 


Neck:  Supple.  No jugular venous distention.  Hepatojugular reflux was 

negative.  Carotids were 2+.  Upstrokes were normal.  No bruits were heard, and 

no thyromegaly was present.


Heart:  PMI was in the fifth intercostal space.  Heart sounds were distant.  

Unable to appreciate murmurs, gallops or rubs.


Lungs:  Decreased breath sounds bilaterally, fine crepitations at the left base.


Abdomen:  Soft and nontender.  Liver is 4 to 5 fingerbreadth below the right 

intercostal margin, is probable nodular and is nontender. No splenomegaly is 

appreciated.  Well-healed midline abdominal scar. No masses were felt.


Extremities:  No calf tenderness or dependent edema.  Femoral pulses were 2+.  

Dorsalis pedis and posterior tibial pulses were not palpable.





Chest x-ray Dated: 10/17/19


Impression: Interval atelectatic changes and probable infiltrates in the left 

lung base. 


 


CTA Chest: Dated: 10/17/19


Impression: See discussion above. There is no gross evidence of a pulmonary 

embolus in the main pulmonary artery and its proximal branches, bilaterally. 

Severe COPD changes again seen. Interval increase in size of previously 

visualized pleural-based nodule/mass in the medial aspect of the right lower 

lobe now measuring 2.5 x 1.3 cm. Interval multiple low-attenuation densities in 

included portion of the liver with the largest measuring 7.5 cm that are highly 

suspicious for metastasis. Heterogeneous attenuation and enhancement of the 

spleen that may be due to rapid intravenous contrast administration. However, 

metastatic lesions cannot be excluded. 








LAB DATA:


 CBC, BMP





 10/20/19 05:45 





 10/20/19 05:45 








IMPRESSION:


1.  Athrosclerotic heart disease/angina pectoris, suspect acute coronary 

syndrome.


2.  Abnormal liver function tests, hepatomegaly, and abdominal CT is highly 

suspicious for metastatic disease.


3.  Oxygen dependent COPD/chronic bronchitis.


4.  Lethargy and mental confusion etiology to be determined.


5.  Dehydration, corrected.


6.  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


7.  History of dementia.


9.  Tobacco abuse.





RECOMMENDATION:


1.  Consider resuming his cardiac medications.


2.  Supportive care.








 


 


 


GABRIEL JHAVERI M.D.

## 2019-10-23 NOTE — PN
Progress Note, Physician


History of Present Illness: 





Pt w/o CP, palpitations, abd pain, back pain, legs pain, arms pain.


Pt with SOB without oxygen mask.


When I asked him about eating stated that eats a little as has low appetite





- Current Medication List


Current Medications: 


Active Medications





Albuterol/Ipratropium (Duoneb -)  1 amp NEB Q6H PRN


   PRN Reason: SHORTNESS OF BREATH


   Last Admin: 10/22/19 19:35 Dose:  1 amp


Aspirin (Ecotrin -)  81 mg PO DAILY [facility]


   Last Admin: 10/23/19 11:33 Dose:  81 mg


Cefuroxime Axetil (Ceftin -)  250 mg PO BID [facility]


   Last Admin: 10/23/19 11:33 Dose:  250 mg


Sodium Chloride (Normal Saline -)  1,000 mls @ 42 mls/hr IV ASDIR [facility]


   Last Admin: 10/23/19 06:40 Dose:  42 mls/hr


Insulin Aspart (Novolog Vial Sliding Scale -)  1 vial SQ BIDAC [facility]; Protocol


   Last Admin: 10/23/19 06:41 Dose:  Not Given


Lorazepam (Ativan -)  0.5 mg PO BID PRN


   PRN Reason: ANXIETY


   Last Admin: 10/22/19 21:41 Dose:  0.5 mg


Olanzapine (Zyprexa -)  5 mg PO HS [facility]


   Last Admin: 10/22/19 21:41 Dose:  5 mg











- Objective


Vital Signs: 


 Vital Signs











Temperature  97.6 F   10/23/19 10:05


 


Pulse Rate  101 H  10/23/19 10:05


 


Respiratory Rate  18   10/23/19 10:05


 


Blood Pressure  135/59 L  10/23/19 10:05


 


O2 Sat by Pulse Oximetry (%)  94 L  10/23/19 09:00











Constitutional: Yes: No Distress, Calm


Cardiovascular: Yes: Regular Rate and Rhythm, S1, S2


Respiratory: Yes: Regular


Gastrointestinal: Yes: Normal Bowel Sounds, Soft, Tenderness


Edema: No


Neurological: Yes: Alert, Oriented


Labs: 


 CBC, BMP





 10/20/19 05:45 





 10/20/19 05:45 





 INR, PTT











INR  1.03  (0.83-1.09)   10/17/19  16:16    














Problem List





- Problems


(1) Cardiac enzymes elevated


Code(s): R74.8 - ABNORMAL LEVELS OF OTHER SERUM ENZYMES   





(2) Failure to thrive in adult


Code(s): R62.7 - ADULT FAILURE TO THRIVE   





(3) Liver metastasis


Code(s): C78.7 - SECONDARY MALIG NEOPLASM OF LIVER AND INTRAHEPATIC BILE DUCT   





(4) CAD (coronary artery disease)


Code(s): I25.10 - ATHSCL HEART DISEASE OF NATIVE CORONARY ARTERY W/O ANG PCTRS 

  





(5) UTI (urinary tract infection)


Code(s): N39.0 - URINARY TRACT INFECTION, SITE NOT SPECIFIED   


Qualifiers: 


   Urinary tract infection type: site unspecified   Hematuria presence: without 

hematuria   Qualified Code(s): N39.0 - Urinary tract infection, site not 

specified   





(6) COPD (chronic obstructive pulmonary disease)


Code(s): J44.9 - CHRONIC OBSTRUCTIVE PULMONARY DISEASE, UNSPECIFIED   





(7) PUD (peptic ulcer disease)


Code(s): K27.9 - PEPTIC ULC, SITE UNSP, UNSP AS AC OR CHR, W/O HEMOR OR PERF   





(8) GI (gastrointestinal bleed)


Code(s): K92.2 - GASTROINTESTINAL HEMORRHAGE, UNSPECIFIED   





(9) Diabetes mellitus


Code(s): E11.9 - TYPE 2 DIABETES MELLITUS WITHOUT COMPLICATIONS   





(10) Bladder cancer


Code(s): C67.9 - MALIGNANT NEOPLASM OF BLADDER, UNSPECIFIED   





(11) Agitation


Code(s): R45.1 - RESTLESSNESS AND AGITATION   





Assessment/Plan





Admitted to Telemetry; to f/u with Cardio.


Cardio, ID  consults are appreciated.


Today pt is at his best (he can provide answers to questions, is relaxed, is 

not SOB) since his admission. 


I spoke with his daughter, Susana; she states that he eat a little today and 

that lately he eats just a little. I updated her about pt's condition and 

treatment. She knows that her father has advanced cancer and cannot tolerate 

chemotherapy treatment. She asked if nursing home provides comfort care, and 

was reassured abut it. Again I expressed my concern about patient low PO intake.


Pt is DNR.


Psychiatry consult is appreciated.


DC planning to NH, both daughter agreed.


Pt's condition was reviewed with pt's nurse

## 2019-10-24 VITALS — TEMPERATURE: 98.1 F | HEART RATE: 104 BPM | DIASTOLIC BLOOD PRESSURE: 54 MMHG | SYSTOLIC BLOOD PRESSURE: 111 MMHG

## 2019-10-24 RX ADMIN — NICOTINE SCH MG: 21 PATCH TRANSDERMAL at 09:30

## 2019-10-24 RX ADMIN — ASPIRIN SCH MG: 81 TABLET, COATED ORAL at 09:30

## 2019-10-24 RX ADMIN — INSULIN ASPART SCH: 100 INJECTION, SOLUTION INTRAVENOUS; SUBCUTANEOUS at 07:17

## 2019-10-24 RX ADMIN — SODIUM CHLORIDE SCH: 9 INJECTION, SOLUTION INTRAVENOUS at 01:12

## 2019-10-24 RX ADMIN — CEFUROXIME AXETIL SCH MG: 250 TABLET, FILM COATED ORAL at 09:29

## 2019-10-24 RX ADMIN — SODIUM CHLORIDE SCH MLS/HR: 9 INJECTION, SOLUTION INTRAVENOUS at 06:46

## 2019-10-24 NOTE — DS
Physical Examination


Vital Signs: 


 Vital Signs











Temperature  98.1 F   10/24/19 09:28


 


Pulse Rate  104 H  10/24/19 09:28


 


Respiratory Rate  19   10/24/19 09:28


 


Blood Pressure  111/54 L  10/24/19 09:28


 


O2 Sat by Pulse Oximetry (%)  96   10/23/19 21:00











Findings/Remarks: 


87yo M with PMH of COPD (not on home O2), HTN, DM, asthma, metastatic bladder CA

, lung and liver masses, h/o long heavy tobacco use, advanced dementia, 

advanced COPD, history of respiratory arrest, admitted for failure to thrive; 

found to have tachycardia, low grade fever r/o PNA - treated with antibiotics 

per ID with some improvement; general weakness, weight loss, needs constant care

; to go to SNF for PT then further planning depending on his course 


Constitutional: Yes: No Distress, Calm


Eyes: Yes: Conjunctiva Clear


HENT: Yes: Atraumatic


Neck: Yes: Supple


Cardiovascular: Yes: Regular Rate and Rhythm


Respiratory: Yes: CTA Bilaterally


Gastrointestinal: Yes: Soft.  No: Tenderness


Renal/: No: Hematuria


Musculoskeletal: No: Joint Stiffness, Joint Swelling


Extremities: No: Cold, Cool


Edema: No


Integumentary: No: Rash, Venous Stasis Changes


Neurological: Yes: WNL, Alert


...Motor Strength: WNL


Psychiatric: Yes: WNL, Alert.  No: Agitated, Suicidal Ideation


Labs: 


 CBC, BMP





 10/20/19 05:45 





 10/20/19 05:45 











Discharge Summary


Problems reviewed: Yes


Reason For Visit: UTI/FAILURE TO THRIVE/ELEVATED TROPONIN LEVEL


Current Active Problems





Agitation (Acute)


Bladder cancer (Acute)


Bowel dysfunction (Acute)


Cardiac enzymes elevated (Acute)


Diabetes mellitus (Acute)


Failure to thrive in adult (Acute)


Liver metastasis (Acute)


UTI (urinary tract infection) (Acute)








Procedures: Principal: admitted with failure to thrive tx with IVF IV ATB 

improved slighlty


Other Procedures: seen by pulmonary, ID, cardiology and ONC; given his advanced 

dementia and poor functional status ONC recommended hospice care / comfort care

; daughter signed DNR (not DNI)


Hospital Course: 


improved slightly with the above tx; to go to SNF for potential rehab then 

further plan to be decided depending on his progress in SNF; turn in bed for 

decubs pfx; falls,aspiration, DVT PFx; prognosis poor. Family aware. 


Condition: Guarded





- Instructions


Disposition: SKILLED NURSING FACILITY





- Home Medications


Comprehensive Discharge Medication List: 


Ambulatory Orders





Unobtainable  10/18/19

## 2019-10-24 NOTE — PN
Progress Note (short form)





- Note


Progress Note: 





86 year with lethargy , male admitted, weakness, incontinent of bodily function 

and workup suggesting metastatic disease. Known case of CAD/andina, O2 

dependent COPD, tobacco abuse





 No significant change in clinical status. Is confused and appears comfortable.


ALLERGIES:  None documented.





Active Medications











Generic Name Dose Route Start Last Admin





  Trade Name Freq  PRN Reason Stop Dose Admin


 


Albuterol/Ipratropium  1 amp  10/21/19 11:21  10/22/19 19:35





  Duoneb -  NEB   1 amp





  Q6H PRN   Administration





  SHORTNESS OF BREATH   





     





     





     


 


Aspirin  81 mg  10/18/19 10:00  10/24/19 09:30





  Ecotrin -  PO   81 mg





  DAILY SAM   Administration





     





     





     





     


 


Cefuroxime Axetil  250 mg  10/21/19 11:00  10/24/19 09:29





  Ceftin -  PO   250 mg





  BID SAM   Administration





     





     





     





     


 


Sodium Chloride  1,000 mls @ 42 mls/hr  10/18/19 00:45  10/24/19 06:46





  Normal Saline -  IV   42 mls/hr





  ASDIR SAM   Administration





     





     





     





     


 


Insulin Aspart  1 vial  10/18/19 07:00  10/24/19 07:17





  Novolog Vial Sliding Scale -  SQ   Not Given





  BIDAC SAM   





     





     





  Protocol   





     


 


Lorazepam  0.5 mg  10/18/19 22:27  10/24/19 01:12





  Ativan -  PO   0.5 mg





  BID PRN   Administration





  ANXIETY   





     





     





     


 


Nicotine  21 mg  10/23/19 13:45  10/24/19 09:30





  Nicoderm Patch -  TD   21 mg





  DAILY SAM   Administration





     





     





     





     


 


Olanzapine  5 mg  10/22/19 22:00  10/23/19 21:46





  Zyprexa -  PO   5 mg





  HS SAM   Administration





     





     





     





     




















PHYSICAL EXAMINATION:


General:  86-year-old gentleman in no distress.  No pallor was appreciated.  No 

clubbing or jaundice.





 Last Vital Signs











Temp Pulse Resp BP Pulse Ox


 


 98.1 F   104 H  19   111/54 L  96 


 


 10/24/19 09:28  10/24/19 09:28  10/24/19 09:28  10/24/19 09:28  10/23/19 21:00











 


Neck:  Supple.  No jugular venous distention. -ve HJR.Carotids were 2+.  

Upstrokes were normal.  No bruits were heard, no thyromegaly was present.


Heart:  PMI was in the fifth intercostal space.  Heart sounds were distant.  

Unable to appreciate murmurs, gallops or rubs.


Lungs:  Decreased breath sounds bilaterally, fine crepitations at the left base.


Abdomen:  Soft and nontender.  Liver is 4 to 5 fingerbreadth below the right 

intercostal margin, is probable nodular and is nontender. No splenomegaly is 

appreciated.  Well-healed midline abdominal scar. No masses were felt.


Extremities:  No calf tenderness or dependent edema.  Femoral pulses were 2+.  

Dorsalis pedis and posterior tibial pulses were not palpable.





CTA Chest: Dated: 10/17/19


Impression: See discussion above. There is no gross evidence of a pulmonary 

embolus in the main pulmonary artery and its proximal branches, bilaterally. 

Severe COPD changes again seen. Interval increase in size of previously 

visualized pleural-based nodule/mass in the medial aspect of the right lower 

lobe now measuring 2.5 x 1.3 cm. Interval multiple low-attenuation densities in 

included portion of the liver with the largest measuring 7.5 cm that are highly 

suspicious for metastasis. Heterogeneous attenuation and enhancement of the 

spleen that may be due to rapid intravenous contrast administration. However, 

metastatic lesions cannot be excluded. 








LAB DATA:


 CBC, BMP





 10/20/19 05:45 





 10/20/19 05:45 








IMPRESSION:


1.  Athrosclerotic heart disease/angina pectoris, suspect acute coronary 

syndrome.


2.  Abnormal liver function tests, hepatomegaly, and abdominal CT is highly 

suspicious for metastatic disease.


3.  Oxygen dependent COPD/chronic bronchitis.


4.  Lethargy and mental confusion etiology to be determined.


5.  Dehydration, corrected.


6.  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


7.  History of dementia.


9.  Tobacco abuse.





RECOMMENDATION:


1.  Consider resuming his previous cardiac and pulmonary medications.


2.  Supportive care.








 


 


 


GABRIEL JHAVERI M.D.

## 2025-02-24 NOTE — PDOC
Attending Attestation





- Resident


Resident Name: Manpreet Howell





- ED Attending Attestation


I have performed the following: I have examined & evaluated the patient, The 

case was reviewed & discussed with the resident, I agree w/resident's findings 

& plan, Exceptions are as noted





- HPI


HPI: 





02/28/18 22:24


Mr Arndt is an 84 yo M with a history of severe COPD (not home O2 dependent) 

recent admission for pneumonia


He presents to the ER today via EMS with his daughter due to feeling "sick"


No fevers or chills


No chest pain


No palpitaitons


no nausea or vomiting


pt apparently was weak and fell at home


this was unwitnessed


Pt repeatedly tells me that he feels bad


He attributes this to feeling like he can not breath





- Physicial Exam


PE: 





02/28/18 23:16


On examination:


Pt is awake and alert


Speaking in complete sentences


Breathing with pursed lips


No tachycardia


Prolonged expiratory phase, faint rhonchi


No abd tenderness


No lower extremity edema





- Medical Decision Making





02/28/18 23:17


84 yo M with h/o severe COPD, presenting to the ER with a complaint of feeling 

sick


vitals significant for hypothermia


Pt is short of breath








02/28/18 23:19


 Laboratory Tests











  02/17/18 02/17/18 02/28/18





  07:00 07:00 20:00


 


WBC  8.6   12.6 H D


 


Hgb  8.5 L D   8.7 L


 


Hct  27.6 L D   28.6 L


 


Plt Count  262   310


 


BUN   26 H 


 


Creatinine   0.6 L 


 


Creatine Kinase   


 


Troponin I   














  02/28/18 02/28/18





  20:00 20:00


 


WBC  


 


Hgb  


 


Hct  


 


Plt Count  


 


BUN  19 H D 


 


Creatinine  0.6 L 


 


Creatine Kinase   61


 


Troponin I   < 0.02








EKG: SR, rate of 72 bpm, RBBB similar to prior EKG





CXR: increased markings


Pt given solumedrol and duo nebs





Head CT pending (pt s/p unwitnessed fall)


Call placed to Dr Javier


Will admit to his service





COPD exacerbation, initial presentation


03/01/18 01:36








**Discharge Disposition





- Diagnosis


 Fall, COPD exacerbation








- Discharge Dispostion


Condition at time of disposition: Stable


Last Admission D/C Date: 02/18/18


Admit: Yes





- Referrals





- Patient Instructions





- Post Discharge Activity
History of Present Illness





- General


Chief Complaint: Weakness


Stated Complaint: SICK


Time Seen by Provider: 02/28/18 19:10


History Source: Patient


Exam Limitations: No Limitations





- History of Present Illness


Initial Comments: 





02/28/18 21:04


The patient is a 85-year-old male with a significant past medical history of 

COPD (not on home O2), HTN, DM, asthma, bladder CA, heavy tobacco use, and a 

history of respiratory arrest, and presents to the emergency department after a 

fall, didn't hit his head but not witnessed, no LOC.





Past History





- Past Medical History


Allergies/Adverse Reactions: 


 Allergies











Allergy/AdvReac Type Severity Reaction Status Date / Time


 


No Known Allergies Allergy   Verified 02/13/18 08:44











Home Medications: 


Ambulatory Orders





Aspirin [Aspirin EC] 81 mg PO DAILY 02/12/18 


Diltiazem HCl [Cartia Xt] 120 mg PO DAILY 02/12/18 


Montelukast Sodium [Singulair] 10 mg PO DAILY 02/12/18 


Acetaminophen [Tylenol .Regular Strength -] 650 mg PO Q6H PRN  tablet 02/17/18 


Albuterol 0.083% Nebulizer Sol [Ventolin 0.083% Nebulizer Soln -] 1 neb NEB Q6H 

PRN #30 vial 02/17/18 


Pantoprazole Sodium [Protonix -] 20 mg PO DAILY #20 tablet.ec 02/17/18 


Umeclidinium Brm/Vilanterol Tr [Anoro Ellipta 62.5-25 Mcg INH] 1 each IH DAILY #

1 blst.w.dev MDD 1 02/18/18 


Cefpodoxime Proxetil [Vantin -] 200 mg PO Q12H #10 tablet MDD 2 02/19/18 








Asthma: Yes


Cancer: Yes


COPD: Yes (EMPHYSEMA; RESPIRATORY ARREST S/P CYSTO)


Diabetes: No (LOW BL.SUGAR)





- Surgical History


Abdominal Surgery: Yes (STOMACH ULCER)





- Suicide/Smoking/Psychosocial Hx


Smoking History: Current every day smoker


Have you smoked in the past 12 months: Yes


Number of Cigarettes Smoked Daily: 40


Information on smoking cessation initiated: Yes


'Breaking Loose' booklet given: 02/14/18


Hx Alcohol Use: Yes (wine daily, whiskey as well)


Drug/Substance Use Hx: No


Substance Use Type: None


Hx Substance Use Treatment: No





**Review of Systems





- Review of Systems


Able to Perform ROS?: No


Is the patient limited English proficient: No


Constitutional: No: Symptoms Reported


HEENTM: No: Symptoms Reported


Respiratory: No: Symptoms reported





*Physical Exam





- Vital Signs


 Last Vital Signs











Temp Pulse Resp BP Pulse Ox


 


 96.8 F L  84   18   125/68   96 


 


 02/28/18 19:12  02/28/18 19:19  02/28/18 19:12  02/28/18 19:12  02/28/18 19:34














- Physical Exam


General Appearance: Yes: Thin


HEENT: positive: EOMI, ARIES


Respiratory/Chest: positive: Rhonchi.  negative: Chest Tender


Cardiovascular: positive: Regular Rhythm, Regular Rate, S1, S2


Gastrointestinal/Abdominal: positive: Normal Bowel Sounds, Flat, Soft.  negative

: Tender


Integumentary: positive: Normal Color, Dry, Warm





ED Treatment Course





- LABORATORY


CBC & Chemistry Diagram: 


 02/28/18 20:00





 02/28/18 20:00





- ADDITIONAL ORDERS


Additional order review: 


 











  02/28/18





  20:00


 


RBC  4.20


 


MCV  68.1 L


 


MCHC  30.3 L


 


RDW  19.4 H


 


MPV  6.8 L


 


Neutrophils %  No Result Required.


 


Lymphocytes %  No Result Required.














- RADIOLOGY


Radiology Studies Ordered: 














 Category Date Time Status


 


 CXRPORT [CHEST X-RAY PORTABLE*] [RAD] Stat Radiology  02/28/18 19:27 Completed














Medical Decision Making





- Medical Decision Making





02/28/18 21:52


All labs WNL


02/28/18 21:59


The heart size is within normal limits. The lung fields are free of pulmonary 

infiltrates or


pleural effusions. There are some increased interstitial markings and mild 

hyperaeration


suggestive of mild COPD.


Will speak to Dr. Javier for admit, 


02/28/18 22:16


PAtient signed out to Dr. Wahl





*DC/Admit/Observation/Transfer


Diagnosis at time of Disposition: 


 Fall








- Referrals





- Patient Instructions





- Post Discharge Activity
hide